# Patient Record
Sex: FEMALE | Race: WHITE | Employment: OTHER | URBAN - NONMETROPOLITAN AREA
[De-identification: names, ages, dates, MRNs, and addresses within clinical notes are randomized per-mention and may not be internally consistent; named-entity substitution may affect disease eponyms.]

---

## 2017-01-24 ENCOUNTER — OFFICE VISIT (OUTPATIENT)
Dept: FAMILY MEDICINE CLINIC | Age: 67
End: 2017-01-24

## 2017-01-24 VITALS
DIASTOLIC BLOOD PRESSURE: 76 MMHG | TEMPERATURE: 98.1 F | OXYGEN SATURATION: 98 % | SYSTOLIC BLOOD PRESSURE: 104 MMHG | HEART RATE: 75 BPM | WEIGHT: 110.6 LBS | BODY MASS INDEX: 21.71 KG/M2 | HEIGHT: 60 IN

## 2017-01-24 DIAGNOSIS — J44.9 CHRONIC OBSTRUCTIVE PULMONARY DISEASE, UNSPECIFIED COPD TYPE (HCC): Primary | ICD-10-CM

## 2017-01-24 DIAGNOSIS — I48.91 ATRIAL FIBRILLATION, UNSPECIFIED TYPE (HCC): ICD-10-CM

## 2017-01-24 DIAGNOSIS — J40 BRONCHITIS: ICD-10-CM

## 2017-01-24 DIAGNOSIS — D75.839 THROMBOCYTOSIS: ICD-10-CM

## 2017-01-24 PROCEDURE — 99213 OFFICE O/P EST LOW 20 MIN: CPT | Performed by: FAMILY MEDICINE

## 2017-01-24 PROCEDURE — G8484 FLU IMMUNIZE NO ADMIN: HCPCS | Performed by: FAMILY MEDICINE

## 2017-01-24 PROCEDURE — G8926 SPIRO NO PERF OR DOC: HCPCS | Performed by: FAMILY MEDICINE

## 2017-01-24 PROCEDURE — 4040F PNEUMOC VAC/ADMIN/RCVD: CPT | Performed by: FAMILY MEDICINE

## 2017-01-24 PROCEDURE — 1123F ACP DISCUSS/DSCN MKR DOCD: CPT | Performed by: FAMILY MEDICINE

## 2017-01-24 PROCEDURE — G8400 PT W/DXA NO RESULTS DOC: HCPCS | Performed by: FAMILY MEDICINE

## 2017-01-24 PROCEDURE — 3023F SPIROM DOC REV: CPT | Performed by: FAMILY MEDICINE

## 2017-01-24 PROCEDURE — 1090F PRES/ABSN URINE INCON ASSESS: CPT | Performed by: FAMILY MEDICINE

## 2017-01-24 PROCEDURE — 1036F TOBACCO NON-USER: CPT | Performed by: FAMILY MEDICINE

## 2017-01-24 PROCEDURE — G8427 DOCREV CUR MEDS BY ELIG CLIN: HCPCS | Performed by: FAMILY MEDICINE

## 2017-01-24 PROCEDURE — 3014F SCREEN MAMMO DOC REV: CPT | Performed by: FAMILY MEDICINE

## 2017-01-24 PROCEDURE — G8419 CALC BMI OUT NRM PARAM NOF/U: HCPCS | Performed by: FAMILY MEDICINE

## 2017-01-24 PROCEDURE — 3017F COLORECTAL CA SCREEN DOC REV: CPT | Performed by: FAMILY MEDICINE

## 2017-01-24 RX ORDER — MOXIFLOXACIN HYDROCHLORIDE 400 MG/1
400 TABLET ORAL DAILY
Qty: 10 TABLET | Refills: 0 | Status: SHIPPED | OUTPATIENT
Start: 2017-01-24 | End: 2017-02-03

## 2017-01-24 RX ORDER — GUAIFENESIN AND CODEINE PHOSPHATE 100; 10 MG/5ML; MG/5ML
5 SOLUTION ORAL EVERY 4 HOURS PRN
Qty: 150 ML | Refills: 0 | Status: SHIPPED | OUTPATIENT
Start: 2017-01-24 | End: 2017-01-31

## 2017-01-24 ASSESSMENT — PATIENT HEALTH QUESTIONNAIRE - PHQ9
2. FEELING DOWN, DEPRESSED OR HOPELESS: 0
SUM OF ALL RESPONSES TO PHQ9 QUESTIONS 1 & 2: 0
1. LITTLE INTEREST OR PLEASURE IN DOING THINGS: 0
SUM OF ALL RESPONSES TO PHQ QUESTIONS 1-9: 0

## 2017-04-09 ENCOUNTER — HOSPITAL ENCOUNTER (EMERGENCY)
Age: 67
Discharge: HOME | End: 2017-04-09
Payer: MEDICARE

## 2017-04-09 VITALS
SYSTOLIC BLOOD PRESSURE: 129 MMHG | TEMPERATURE: 97.6 F | HEART RATE: 90 BPM | DIASTOLIC BLOOD PRESSURE: 80 MMHG | WEIGHT: 111 LBS | BODY MASS INDEX: 21.68 KG/M2 | RESPIRATION RATE: 20 BRPM | OXYGEN SATURATION: 98 %

## 2017-04-09 DIAGNOSIS — M75.02 ADHESIVE CAPSULITIS OF LEFT SHOULDER: ICD-10-CM

## 2017-04-09 DIAGNOSIS — M25.512 ACUTE PAIN OF LEFT SHOULDER: Primary | ICD-10-CM

## 2017-04-09 PROCEDURE — 99282 EMERGENCY DEPT VISIT SF MDM: CPT

## 2017-04-09 RX ORDER — PREDNISONE 20 MG/1
20 TABLET ORAL 2 TIMES DAILY
Qty: 10 TABLET | Refills: 0 | Status: SHIPPED | OUTPATIENT
Start: 2017-04-09 | End: 2017-04-14

## 2017-04-09 ASSESSMENT — PAIN DESCRIPTION - ORIENTATION: ORIENTATION: LEFT

## 2017-04-09 ASSESSMENT — PAIN SCALES - GENERAL: PAINLEVEL_OUTOF10: 10

## 2017-04-09 ASSESSMENT — ENCOUNTER SYMPTOMS
SHORTNESS OF BREATH: 0
NAUSEA: 0
ABDOMINAL PAIN: 0
COUGH: 0
DIARRHEA: 0
VOMITING: 0

## 2017-04-09 ASSESSMENT — PAIN DESCRIPTION - LOCATION: LOCATION: SHOULDER

## 2017-04-10 ENCOUNTER — CARE COORDINATION (OUTPATIENT)
Dept: CARE COORDINATION | Age: 67
End: 2017-04-10

## 2017-04-13 ENCOUNTER — OFFICE VISIT (OUTPATIENT)
Dept: FAMILY MEDICINE CLINIC | Age: 67
End: 2017-04-13

## 2017-04-13 VITALS
HEIGHT: 60 IN | WEIGHT: 113.2 LBS | OXYGEN SATURATION: 99 % | BODY MASS INDEX: 22.23 KG/M2 | DIASTOLIC BLOOD PRESSURE: 70 MMHG | SYSTOLIC BLOOD PRESSURE: 138 MMHG | HEART RATE: 58 BPM

## 2017-04-13 DIAGNOSIS — M75.02 ADHESIVE CAPSULITIS OF LEFT SHOULDER: Primary | ICD-10-CM

## 2017-04-13 PROCEDURE — 3017F COLORECTAL CA SCREEN DOC REV: CPT | Performed by: FAMILY MEDICINE

## 2017-04-13 PROCEDURE — 1036F TOBACCO NON-USER: CPT | Performed by: FAMILY MEDICINE

## 2017-04-13 PROCEDURE — 1090F PRES/ABSN URINE INCON ASSESS: CPT | Performed by: FAMILY MEDICINE

## 2017-04-13 PROCEDURE — 3014F SCREEN MAMMO DOC REV: CPT | Performed by: FAMILY MEDICINE

## 2017-04-13 PROCEDURE — 4040F PNEUMOC VAC/ADMIN/RCVD: CPT | Performed by: FAMILY MEDICINE

## 2017-04-13 PROCEDURE — G8427 DOCREV CUR MEDS BY ELIG CLIN: HCPCS | Performed by: FAMILY MEDICINE

## 2017-04-13 PROCEDURE — 99213 OFFICE O/P EST LOW 20 MIN: CPT | Performed by: FAMILY MEDICINE

## 2017-04-13 PROCEDURE — 1123F ACP DISCUSS/DSCN MKR DOCD: CPT | Performed by: FAMILY MEDICINE

## 2017-04-13 PROCEDURE — G8400 PT W/DXA NO RESULTS DOC: HCPCS | Performed by: FAMILY MEDICINE

## 2017-04-13 PROCEDURE — G8419 CALC BMI OUT NRM PARAM NOF/U: HCPCS | Performed by: FAMILY MEDICINE

## 2017-04-13 PROCEDURE — 20610 DRAIN/INJ JOINT/BURSA W/O US: CPT | Performed by: FAMILY MEDICINE

## 2017-04-13 RX ORDER — PANTOPRAZOLE SODIUM 40 MG/1
TABLET, DELAYED RELEASE ORAL
Qty: 30 TABLET | Refills: 5 | Status: SHIPPED | OUTPATIENT
Start: 2017-04-13 | End: 2017-11-19 | Stop reason: SDUPTHER

## 2017-04-13 RX ORDER — TRIAMCINOLONE ACETONIDE 40 MG/ML
40 INJECTION, SUSPENSION INTRA-ARTICULAR; INTRAMUSCULAR ONCE
Status: COMPLETED | OUTPATIENT
Start: 2017-04-13 | End: 2017-04-13

## 2017-04-13 RX ADMIN — TRIAMCINOLONE ACETONIDE 40 MG: 40 INJECTION, SUSPENSION INTRA-ARTICULAR; INTRAMUSCULAR at 11:12

## 2017-04-20 ENCOUNTER — OFFICE VISIT (OUTPATIENT)
Dept: FAMILY MEDICINE CLINIC | Age: 67
End: 2017-04-20

## 2017-04-20 VITALS
BODY MASS INDEX: 22.19 KG/M2 | HEIGHT: 60 IN | DIASTOLIC BLOOD PRESSURE: 68 MMHG | HEART RATE: 82 BPM | SYSTOLIC BLOOD PRESSURE: 114 MMHG | OXYGEN SATURATION: 98 % | WEIGHT: 113 LBS

## 2017-04-20 DIAGNOSIS — Z12.31 ENCOUNTER FOR SCREENING MAMMOGRAM FOR MALIGNANT NEOPLASM OF BREAST: ICD-10-CM

## 2017-04-20 DIAGNOSIS — Z78.0 POSTMENOPAUSAL: ICD-10-CM

## 2017-04-20 DIAGNOSIS — M75.02 ADHESIVE CAPSULITIS OF LEFT SHOULDER: Primary | ICD-10-CM

## 2017-04-20 PROCEDURE — 3017F COLORECTAL CA SCREEN DOC REV: CPT | Performed by: FAMILY MEDICINE

## 2017-04-20 PROCEDURE — 1036F TOBACCO NON-USER: CPT | Performed by: FAMILY MEDICINE

## 2017-04-20 PROCEDURE — 1090F PRES/ABSN URINE INCON ASSESS: CPT | Performed by: FAMILY MEDICINE

## 2017-04-20 PROCEDURE — 3014F SCREEN MAMMO DOC REV: CPT | Performed by: FAMILY MEDICINE

## 2017-04-20 PROCEDURE — G8419 CALC BMI OUT NRM PARAM NOF/U: HCPCS | Performed by: FAMILY MEDICINE

## 2017-04-20 PROCEDURE — 99212 OFFICE O/P EST SF 10 MIN: CPT | Performed by: FAMILY MEDICINE

## 2017-04-20 PROCEDURE — G8400 PT W/DXA NO RESULTS DOC: HCPCS | Performed by: FAMILY MEDICINE

## 2017-04-20 PROCEDURE — 4040F PNEUMOC VAC/ADMIN/RCVD: CPT | Performed by: FAMILY MEDICINE

## 2017-04-20 PROCEDURE — G8427 DOCREV CUR MEDS BY ELIG CLIN: HCPCS | Performed by: FAMILY MEDICINE

## 2017-04-20 PROCEDURE — 1123F ACP DISCUSS/DSCN MKR DOCD: CPT | Performed by: FAMILY MEDICINE

## 2017-05-17 ENCOUNTER — HOSPITAL ENCOUNTER (OUTPATIENT)
Dept: WOMENS IMAGING | Age: 67
Discharge: HOME | End: 2017-05-17
Payer: MEDICARE

## 2017-05-17 DIAGNOSIS — Z12.31 ENCOUNTER FOR SCREENING MAMMOGRAM FOR MALIGNANT NEOPLASM OF BREAST: ICD-10-CM

## 2017-05-17 DIAGNOSIS — Z78.0 POSTMENOPAUSAL: ICD-10-CM

## 2017-05-17 PROCEDURE — 77080 DXA BONE DENSITY AXIAL: CPT

## 2017-05-17 PROCEDURE — G0202 SCR MAMMO BI INCL CAD: HCPCS

## 2017-05-18 RX ORDER — ALENDRONATE SODIUM 70 MG/1
70 TABLET ORAL
Qty: 4 TABLET | Refills: 11 | Status: SHIPPED | OUTPATIENT
Start: 2017-05-18 | End: 2018-04-21 | Stop reason: SDUPTHER

## 2017-08-10 ENCOUNTER — CARE COORDINATOR VISIT (OUTPATIENT)
Dept: FAMILY MEDICINE CLINIC | Age: 67
End: 2017-08-10

## 2017-08-16 ENCOUNTER — CARE COORDINATION (OUTPATIENT)
Dept: FAMILY MEDICINE CLINIC | Age: 67
End: 2017-08-16

## 2017-08-23 ENCOUNTER — CARE COORDINATION (OUTPATIENT)
Dept: FAMILY MEDICINE CLINIC | Age: 67
End: 2017-08-23

## 2017-09-06 ENCOUNTER — CARE COORDINATION (OUTPATIENT)
Dept: FAMILY MEDICINE CLINIC | Age: 67
End: 2017-09-06

## 2017-10-11 ENCOUNTER — CARE COORDINATION (OUTPATIENT)
Dept: FAMILY MEDICINE CLINIC | Age: 67
End: 2017-10-11

## 2017-10-11 NOTE — CARE COORDINATION
Ambulatory Care Coordination Note  10/11/2017  CM Risk Score: 5  Edwin Mortality Risk Score: 6.56    ACC: Adryan Ken, RN    Summary Note: I called to check in on Pedro Everett and to discuss COPD. She tells me that she is doing well however, she sounds a little stuffy. She says that she did have to use her rescue inhaler yesterday because she was cleaning out a linen closet and she feels that the dust aggravated her breathing. She tells me that she only used it once and it did help considerably. We did discuss COPD signs and symptoms and we also discussed the zone sheets and she thinks that she is in the green zone. She says that she continues to take all of her medications and we did review these. She denies any issues with heart palpitations or heart racing. She does not know how to check her heart rate but she feels that her heart is doing well and she will come in to the office later in the year to have me teach her how to check her pulse. She says that she is eating and drinking well. She is looking for a new ENT doctor as her current doctor will only be practicing in [facility] and she does not want to go that far. I have asked Pedro Everett to call me back if her sinus congestion gets any worse and she says that she will call. COPD Assessment    Does the patient understand envrionmental exposure?:  Yes  Is the patient able to verbalize Rescue vs. Long Acting medications?:  Yes  Does the patient have a nebulizer?:  No  Does the patient use a space with inhaled medications?:  No     No patient-reported symptoms         Symptoms:               Care Coordination Interventions    Program Enrollment:  Rising Risk  Referral from Primary Care Provider:  No  Suggested Interventions and Community Resources  Disease Association: In Process  Zone Management Tools:   In Process         Goals Addressed             Most Recent     Conditions and Symptoms   Improving (10/11/2017)             I will schedule office visits, as directed by my provider. I will keep my appointment or reschedule if I have to cancel. I will notify my provider of any barriers to my plan of care. I will follow my Zone Management tool to seek urgent or emergent care. I will notify my provider of any symptoms that indicate a worsening of my condition. Barriers: none  Plan for overcoming my barriers: N/A  Confidence: 10/10  Anticipated Goal Completion Date: 10/15/2017                Prior to Admission medications    Medication Sig Start Date End Date Taking?  Authorizing Provider   COMBIVENT RESPIMAT  MCG/ACT AERS inhaler INHALE 1 PUFF FOUR TIMES A DAY AS NEEDED 6/27/17  Yes Historical Provider, MD   fluticasone (FLONASE) 50 MCG/ACT nasal spray 2 sprays by Nasal route daily 8/30/17  Yes [address] [place], [place] [address]DOYLE   predniSONE (DELTASONE) 10 MG tablet Take 5 tabs daily x 3 days, 4 tabs daily x 3 days, 3 tabs daily x 3 days, 2 tabs daily x 3 days, 1 tab daily x 3 days 8/30/17  Yes Edith Acosta NP   benzonatate (TESSALON) 100 MG capsule 1-2 capsules 3x a day as needed for cough 8/30/17  Yes Edith Acosta NP   alendronate (FOSAMAX) 70 MG tablet Take 1 tablet by mouth every 7 days 5/18/17  Yes Crystal Dozier MD   pantoprazole (PROTONIX) 40 MG tablet TAKE ONE TABLET BY MOUTH DAILY 4/13/17  Yes Crystal Dozier MD   Vitamin D (CHOLECALCIFEROL) 1000 UNITS CAPS capsule Take 1,000 Units by mouth daily   Yes Historical Provider, MD   Multiple Vitamin (MULTI VITAMIN DAILY PO) Take by mouth   Yes Historical Provider, MD   anagrelide (AGRYLIN) 0.5 MG capsule 0.5 mg 2 times daily  8/17/15  Yes Historical Provider, MD   digoxin (LANOXIN) 125 MCG tablet  6/5/15  Yes Historical Provider, MD   verapamil (CALAN-SR) 120 MG CR tablet  6/5/15  Yes Historical Provider, MD       Future Appointments  Date Time Provider Alexandru Limon   12/20/2017 1:30 PM Odilon Gonzalez MD [facility]

## 2017-10-16 ENCOUNTER — HOSPITAL ENCOUNTER (OUTPATIENT)
Dept: GENERAL RADIOLOGY | Age: 67
Discharge: HOME | End: 2017-10-16
Payer: MEDICARE

## 2017-10-16 DIAGNOSIS — J20.9 BRONCHITIS, ACUTE, WITH BRONCHOSPASM: ICD-10-CM

## 2017-10-16 PROCEDURE — 71020 XR CHEST STANDARD TWO VW: CPT

## 2017-11-01 ENCOUNTER — HOSPITAL ENCOUNTER (OUTPATIENT)
Dept: GENERAL RADIOLOGY | Age: 67
Discharge: HOME | End: 2017-11-01
Payer: MEDICARE

## 2017-11-01 DIAGNOSIS — R07.81 RIB PAIN ON LEFT SIDE: ICD-10-CM

## 2017-11-01 DIAGNOSIS — R07.81 RIB PAIN ON RIGHT SIDE: ICD-10-CM

## 2017-11-01 PROCEDURE — 71110 X-RAY EXAM RIBS BIL 3 VIEWS: CPT

## 2017-11-13 ENCOUNTER — CARE COORDINATION (OUTPATIENT)
Dept: FAMILY MEDICINE CLINIC | Age: 67
End: 2017-11-13

## 2017-11-14 NOTE — CARE COORDINATION
Ambulatory Care Coordination Note  11/14/2017  CM Risk Score: 1  Edwin Mortality Risk Score: 4.95    ACC: Jessica Alex, RN    Summary Note: I called to check in on Paco Elias and she sounds a bit raspy. She tells me that this is her morning voice. She says that she did go to the walk in clinic not to long ago because she received a \"bear hug\" from a friend and she felt a pop in her rib cage. She says that she had xray's and everything seemed to be okay. She says that she is still using ice and heat. She is also taking tylenol occasionally when she is having rib pain. She tells me that her breathing has been pretty good. She is very cautious around different environments because she understands her triggers for her breathing. These include smoke, perfumes, heavy scents, and dust.  We did talk at length about triggers, breathing and the stress, and breathing techniques. She tells me that she does clean the [employer] every other week and sometimes the aerosols will start her coughing and the dust seems to be an issue as well. I suggested that she wear a mask to help filter some of these triggers. She tells me that she will try the mask. She feels that she is in the green zone for COPD. I did speak with her about making regular appointments as she has not been for a check up since April. She would like to wait until after the holidays to schedule an appointment with Dr Aris Florian. COPD Assessment    Does the patient understand envrionmental exposure?:  Yes  Is the patient able to verbalize Rescue vs. Long Acting medications?:  Yes  Does the patient have a nebulizer?:  No  Does the patient use a space with inhaled medications?:  No     No patient-reported symptoms         Symptoms:               Care Coordination Interventions    Program Enrollment:  Rising Risk  Referral from Primary Care Provider:  No  Suggested Interventions and Community Resources  Disease Association: In Process  Zone Management Tools:   In

## 2017-11-20 RX ORDER — PANTOPRAZOLE SODIUM 40 MG/1
TABLET, DELAYED RELEASE ORAL
Qty: 30 TABLET | Refills: 4 | Status: SHIPPED | OUTPATIENT
Start: 2017-11-20 | End: 2018-05-02 | Stop reason: SDUPTHER

## 2017-12-13 ENCOUNTER — CARE COORDINATION (OUTPATIENT)
Dept: CARE COORDINATION | Age: 67
End: 2017-12-13

## 2017-12-18 NOTE — CARE COORDINATION
Ambulatory Care Coordination Note  12/18/2017  CM Risk Score: 1  Edwin Mortality Risk Score: 6.56    ACC: Lisa Mc, RN    Summary Note: I called to check in on [facility] and to discuss COPD. She does sound a little bit stuffy. She says that she is doing okay and that she has been outside ringing the bell for the [employer]. She denies any chest congestion or cough. She says that she did see her ENT and he is closing the [facility] office so she is supposed to see Dr Remi Claire  However, she says that he is not in the office for personal reasons. She says that she really does not have anything going on right now but if she does she may need to change to someone else. She says that she does not want to go outside [place]. I did suggest Dr Robinson Fields and she will think about seeing him. She denies any SOB with activity or at rest. She says that her medications are good and she is taking everything as prescribed. Care Coordination Interventions    Program Enrollment:  Rising Risk  Referral from Primary Care Provider:  No  Suggested Interventions and Community Resources  Disease Association: In Process  Zone Management Tools: In Process         Goals Addressed     None          Prior to Admission medications    Medication Sig Start Date End Date Taking?  Authorizing Provider   pantoprazole (PROTONIX) 40 MG tablet TAKE ONE TABLET BY MOUTH DAILY 11/20/17   Edwina Dunlap MD   albuterol sulfate  (90 Base) MCG/ACT inhaler Inhale 2 puffs into the lungs every 6 hours as needed for Wheezing 10/16/17   Jameel Daley NP   methylPREDNISolone (MEDROL DOSEPACK) 4 MG tablet Take by mouth. 10/16/17   Jameel Daley NP   COMBIVENT RESPIMAT  MCG/ACT AERS inhaler INHALE 1 PUFF FOUR TIMES A DAY AS NEEDED 6/27/17   Historical Provider, MD   fluticasone (FLONASE) 50 MCG/ACT nasal spray 2 sprays by Nasal route daily 8/30/17   Stephane Acosta NP   alendronate (FOSAMAX) 70 MG tablet Take 1 tablet by mouth every 7 days 5/18/17   Eleanor Danielle MD   Vitamin D (CHOLECALCIFEROL) 1000 UNITS CAPS capsule Take 1,000 Units by mouth daily    Historical Provider, MD   Multiple Vitamin (MULTI VITAMIN DAILY PO) Take by mouth    Historical Provider, MD   anagrelide Will Homme) 0.5 MG capsule 0.5 mg 2 times daily  8/17/15   Historical Provider, MD   digoxin [facility] TRANSPLANT [facility]) 125 MCG tablet  6/5/15   Historical Provider, MD   verapamil (CALAN-SR) 120 MG CR tablet  6/5/15   Historical Provider, MD       Future Appointments  Date Time Provider Alexandru Limon   12/20/2017 1:30 PM Lobo Park MD [facility]

## 2017-12-20 ENCOUNTER — OFFICE VISIT (OUTPATIENT)
Dept: PULMONOLOGY | Age: 67
End: 2017-12-20

## 2017-12-20 VITALS
HEIGHT: 60 IN | WEIGHT: 112 LBS | BODY MASS INDEX: 21.99 KG/M2 | SYSTOLIC BLOOD PRESSURE: 118 MMHG | OXYGEN SATURATION: 98 % | HEART RATE: 75 BPM | DIASTOLIC BLOOD PRESSURE: 54 MMHG

## 2017-12-20 DIAGNOSIS — J44.9 CHRONIC OBSTRUCTIVE PULMONARY DISEASE, UNSPECIFIED COPD TYPE (HCC): Primary | ICD-10-CM

## 2017-12-20 DIAGNOSIS — J32.9 RECURRENT SINUSITIS: ICD-10-CM

## 2017-12-20 PROCEDURE — 99214 OFFICE O/P EST MOD 30 MIN: CPT | Performed by: INTERNAL MEDICINE

## 2017-12-20 PROCEDURE — 1123F ACP DISCUSS/DSCN MKR DOCD: CPT | Performed by: INTERNAL MEDICINE

## 2017-12-20 PROCEDURE — 1090F PRES/ABSN URINE INCON ASSESS: CPT | Performed by: INTERNAL MEDICINE

## 2017-12-20 PROCEDURE — G8399 PT W/DXA RESULTS DOCUMENT: HCPCS | Performed by: INTERNAL MEDICINE

## 2017-12-20 PROCEDURE — G8926 SPIRO NO PERF OR DOC: HCPCS | Performed by: INTERNAL MEDICINE

## 2017-12-20 PROCEDURE — G8420 CALC BMI NORM PARAMETERS: HCPCS | Performed by: INTERNAL MEDICINE

## 2017-12-20 PROCEDURE — 3014F SCREEN MAMMO DOC REV: CPT | Performed by: INTERNAL MEDICINE

## 2017-12-20 PROCEDURE — 3017F COLORECTAL CA SCREEN DOC REV: CPT | Performed by: INTERNAL MEDICINE

## 2017-12-20 PROCEDURE — G8427 DOCREV CUR MEDS BY ELIG CLIN: HCPCS | Performed by: INTERNAL MEDICINE

## 2017-12-20 PROCEDURE — G8484 FLU IMMUNIZE NO ADMIN: HCPCS | Performed by: INTERNAL MEDICINE

## 2017-12-20 PROCEDURE — 1036F TOBACCO NON-USER: CPT | Performed by: INTERNAL MEDICINE

## 2017-12-20 PROCEDURE — 4040F PNEUMOC VAC/ADMIN/RCVD: CPT | Performed by: INTERNAL MEDICINE

## 2017-12-20 PROCEDURE — 3023F SPIROM DOC REV: CPT | Performed by: INTERNAL MEDICINE

## 2017-12-20 RX ORDER — MAGNESIUM HYDROXIDE 1200 MG/15ML
1 LIQUID ORAL 2 TIMES DAILY
Refills: 12 | COMMUNITY
Start: 2017-11-30 | End: 2019-01-01

## 2017-12-20 ASSESSMENT — ENCOUNTER SYMPTOMS
VOMITING: 0
SORE THROAT: 0
ABDOMINAL PAIN: 0
SHORTNESS OF BREATH: 0
RHINORRHEA: 0
WHEEZING: 1
CHEST TIGHTNESS: 0
DIARRHEA: 0
EYE DISCHARGE: 0
TROUBLE SWALLOWING: 0
SINUS PRESSURE: 0
EYE ITCHING: 0
NAUSEA: 0
COUGH: 1
VOICE CHANGE: 0

## 2017-12-20 NOTE — PROGRESS NOTES
Subjective:     Lizbeth Casarez is a 75 y.o. female who complains today of:     Chief Complaint   Patient presents with    Follow-up       HPI  Patient is using combivent respimat 1puff QID prn.  C/o Shortness of breath   Occasional Wheezing   C/o Cough with  Sputum  No Hemoptysis  No Chest pain or pleuritic pain  No Fever or chills. No Rhinorrhea or postnasal drip. Allergies:  Lorazepam; Penicillins; Rocephin [ceftriaxone]; Thiopental; Vancomycin; and Asa [aspirin]  Past Medical History:   Diagnosis Date    Atrial fibrillation (HCC)     Chronic anemia     Chronic rhinitis     DR. NAPIER    Chronic sinusitis     Colon polyp 06/03/2015    DR Lara Meza    Diverticulosis of colon (without mention of hemorrhage) 06/03/2015    DR Lara Meza    Lung mass     was suspicious for malignancy, but path negative.  MVP (mitral valve prolapse) 5/28/2014    Recurrent sinusitis     DR. NAPIER    Thrombocytosis ([facility] [address]     Thyroid nodule      Past Surgical History:   Procedure Laterality Date    BRONCHOSCOPY      CATARACT REMOVAL WITH IMPLANT Left 03/18/16    [facility] Mansi Meyer MD    CATARACT REMOVAL WITH IMPLANT Right 04/22/16    [facility] Mansi Meyer MD    COLONOSCOPY  06/03/2015    DR Lara Meza - DIVERTICULOSIS, POLYPS    HYSTERECTOMY      TEAR DUCT SURGERY Left 12/16/2016    [facility] PEDRO LUIS TELLO MD      THYROIDECTOMY, PARTIAL  11/18/14    PATRICIA CHACON MD    UPPER GASTROINTESTINAL ENDOSCOPY  7/13/15    w/bx      History reviewed. No pertinent family history. Social History     Social History    Marital status:      Spouse name: N/A    Number of children: N/A    Years of education: N/A     Occupational History    Not on file.      Social History Main Topics    Smoking status: Former Smoker     Quit date: 12/26/1982    Smokeless tobacco: Never Used      Comment: quit 34 years ago    Alcohol use No    Drug use: No    Sexual activity: No     Other Topics Concern    Not on file     Social History Narrative    No narrative on file         Review of Systems   Constitutional: Negative for chills, diaphoresis, fatigue and fever. HENT: Negative for congestion, mouth sores, nosebleeds, postnasal drip, rhinorrhea, sinus pressure, sneezing, sore throat, trouble swallowing and voice change. Eyes: Negative for discharge, itching and visual disturbance. Respiratory: Positive for cough and wheezing. Negative for chest tightness and shortness of breath. Cardiovascular: Negative for chest pain, palpitations and leg swelling. Gastrointestinal: Negative for abdominal pain, diarrhea, nausea and vomiting. Genitourinary: Negative for difficulty urinating and hematuria. Musculoskeletal: Negative for arthralgias, joint swelling and myalgias. Skin: Negative for rash. Allergic/Immunologic: Negative for environmental allergies. Neurological: Negative for dizziness, tremors, weakness and headaches. Psychiatric/Behavioral: Negative for behavioral problems and sleep disturbance. Objective:     Vitals:    12/20/17 1317   BP: (!) 118/54   Pulse: 75   SpO2: 98%   Weight: 112 lb (50.8 kg)   Height: 5' (1.524 m)         Physical Exam   Constitutional: She is oriented to person, place, and time. She appears well-developed and well-nourished. No distress. HENT:   Head: Normocephalic and atraumatic. Nose: Nose normal.   Mouth/Throat: Oropharynx is clear and moist.   Eyes: EOM are normal. Pupils are equal, round, and reactive to light. Neck: No JVD present. No tracheal deviation present. No thyromegaly present. Cardiovascular: Normal rate and regular rhythm. Exam reveals no gallop and no friction rub. No murmur heard. Pulmonary/Chest: No respiratory distress. She has wheezes. She has no rales. She exhibits no tenderness. Diminished BS bilaterally. Abdominal: She exhibits no distension. There is no tenderness. There is no rebound. Musculoskeletal: Normal range of motion. She exhibits no edema. CHEST.        Assessment/Plan:     1. Chronic obstructive pulmonary disease, unspecified COPD type [facility])  Patient had a chest x-ray done which shows no active disease on October 16, 2070. She is using combivent respimat 1 puff 4 times a day when necessary basis. Exertional shortness of breath and no wheezing. I'll request complete PFT for further evaluation before next visit and see if needed to changed bronchodilator. 2. Recurrent sinusitis  Patient has a complaint of recurrent sinusitis and postnasal drip. Cough is likely due to postnasal drip. Return in about 3 months (around 3/20/2018) for COPD.       Antonia Hester MD

## 2017-12-29 ENCOUNTER — HOSPITAL ENCOUNTER (OUTPATIENT)
Dept: GENERAL RADIOLOGY | Age: 67
Discharge: HOME | End: 2017-12-29
Payer: MEDICARE

## 2017-12-29 DIAGNOSIS — J44.9 CHRONIC OBSTRUCTIVE PULMONARY DISEASE, UNSPECIFIED COPD TYPE (HCC): ICD-10-CM

## 2017-12-29 PROCEDURE — 71020 XR CHEST STANDARD TWO VW: CPT

## 2018-01-17 ENCOUNTER — CARE COORDINATION (OUTPATIENT)
Dept: FAMILY MEDICINE CLINIC | Age: 68
End: 2018-01-17

## 2018-01-29 ENCOUNTER — HOSPITAL ENCOUNTER (OUTPATIENT)
Dept: PULMONOLOGY | Age: 68
Discharge: HOME | End: 2018-01-29
Payer: COMMERCIAL

## 2018-01-29 DIAGNOSIS — J44.9 CHRONIC OBSTRUCTIVE PULMONARY DISEASE, UNSPECIFIED COPD TYPE (HCC): ICD-10-CM

## 2018-01-29 PROCEDURE — 94060 EVALUATION OF WHEEZING: CPT

## 2018-01-29 PROCEDURE — 94060 EVALUATION OF WHEEZING: CPT | Performed by: INTERNAL MEDICINE

## 2018-01-29 PROCEDURE — 94726 PLETHYSMOGRAPHY LUNG VOLUMES: CPT | Performed by: INTERNAL MEDICINE

## 2018-01-29 PROCEDURE — 94729 DIFFUSING CAPACITY: CPT

## 2018-01-29 PROCEDURE — 94726 PLETHYSMOGRAPHY LUNG VOLUMES: CPT

## 2018-01-29 PROCEDURE — 94729 DIFFUSING CAPACITY: CPT | Performed by: INTERNAL MEDICINE

## 2018-01-29 PROCEDURE — 6360000002 HC RX W HCPCS: Performed by: INTERNAL MEDICINE

## 2018-01-29 RX ORDER — ALBUTEROL SULFATE 2.5 MG/3ML
2.5 SOLUTION RESPIRATORY (INHALATION) ONCE
Status: COMPLETED | OUTPATIENT
Start: 2018-01-29 | End: 2018-01-29

## 2018-01-29 RX ADMIN — ALBUTEROL SULFATE 2.5 MG: 2.5 SOLUTION RESPIRATORY (INHALATION) at 10:58

## 2018-01-31 NOTE — PROCEDURES
[address]                       [address], [address] [facility]                                PULMONARY FUNCTION    PATIENT NAME: Souleymane Garza                     :        1950  MED REC NO:   [id number]                            ROOM:  ACCOUNT NO:   [de-identified]                           ADMIT DATE: 2018  PROVIDER:     Wen Mccormick MD    DATE OF PROCEDURE:  2018    PFTs were done on this 21-year-old patient, who is 5 feet tall, weighs 112  pounds, with 10-year smoking history of few cigarettes a day, but quit 34  years ago, presenting with dyspnea and cough. Spirometry showed a forced vital capacity of 1.88 L, which is 73% of  predicted. FEV1 was 1.41 L, which is 72% of predicted. FEV1/FVC ratio was  within normal limits at 75%. FEF 25-75% was moderately reduced to 1.09 L  per second, which is 63% of predicted. Minimal improvement noted in FEV1  and in terminal airflow after bronchodilator therapy. MVV was adequate. Lung volumes done by body plethysmography showed a normal total lung  capacity of 4.25 L, which is 95% of predicted. Residual volume was 2.29 L,  which is 116% of predicted with increased RV/TLC ratio at 54%. Diffusion capacity was mildly reduced to 13.7, which is 72% of predicted. Airway resistance was mildly increased. Airway conductance was decreased. OVERALL IMPRESSION:  This study is consistent with mild obstructive  ventilatory impairment with borderline reversibility after bronchodilator  therapy, associated with mild overinflation and mild diffusion impairment.         Marybeth Coleman MD    D: 2018 10:53:26       T: 2018 13:27:46     [email or web address]  Job#: [id number]     Doc#: [id number]    CC:

## 2018-02-23 ENCOUNTER — CARE COORDINATION (OUTPATIENT)
Dept: FAMILY MEDICINE CLINIC | Age: 68
End: 2018-02-23

## 2018-02-23 NOTE — CARE COORDINATION
10/15/2017                Prior to Admission medications    Medication Sig Start Date End Date Taking?  Authorizing Provider   sodium chloride 0.9 % irrigation Irrigate with 1 mL as directed 2 times daily 11/30/17   Historical Provider, MD   mupirocin (BACTROBAN) 2 % ointment Apply 2 Act topically 2 times daily 11/30/17   Historical Provider, MD   pantoprazole (PROTONIX) 40 MG tablet TAKE ONE TABLET BY MOUTH DAILY 11/20/17   Isma Garsia MD   albuterol sulfate  (90 Base) MCG/ACT inhaler Inhale 2 puffs into the lungs every 6 hours as needed for Wheezing 10/16/17   Shelly Bazzi NP   methylPREDNISolone (MEDROL DOSEPACK) 4 MG tablet Take by mouth. 10/16/17   Shelly Bazzi NP   COMBIVENT RESPIMAT  MCG/ACT AERS inhaler INHALE 1 PUFF FOUR TIMES A DAY AS NEEDED 6/27/17   Historical Provider, MD   fluticasone (FLONASE) 50 MCG/ACT nasal spray 2 sprays by Nasal route daily 8/30/17   Dougie Garcia NP   alendronate (FOSAMAX) 70 MG tablet Take 1 tablet by mouth every 7 days 5/18/17   Isma Garsia MD   Vitamin D (CHOLECALCIFEROL) 1000 UNITS CAPS capsule Take 1,000 Units by mouth daily    Historical Provider, MD   Multiple Vitamin (MULTI VITAMIN DAILY PO) Take by mouth    Historical Provider, MD   anagrelide Julious Anes) 0.5 MG capsule 0.5 mg 2 times daily  8/17/15   Historical Provider, MD   digoxin (LANOXIN) 125 MCG tablet  6/5/15   Historical Provider, MD   verapamil (CALAN-SR) 120 MG CR tablet  6/5/15   Historical Provider, MD       Future Appointments  Date Time Provider Alexandru Limon   3/29/2018 2:00 PM Doc Lazaro MD [facility]

## 2018-03-13 LAB — DIGOXIN LEVEL: 1.5 NG/ML (ref 0.8–2)

## 2018-03-23 ENCOUNTER — CARE COORDINATION (OUTPATIENT)
Dept: FAMILY MEDICINE CLINIC | Age: 68
End: 2018-03-23

## 2018-03-23 NOTE — CARE COORDINATION
Ambulatory Care Coordination Note  3/23/2018  CM Risk Score: 1  Edwin Mortality Risk Score: 7.11    ACC: Josh Garsia RN    Summary Note: Altagracia Meier returned my call. She tells me that she is doing well in general.  She says that she did see Dr Alia Diaz last week as she normally sees her once per year for her \"leaky mitral valve. \"  She tells me that she asked a few questions and then suggested that she wear a heart monitor for a month. She has had the monitor for about a week. She denies any issues with SOB, chest pain, or dizziness. She tells me that she is eating and drinking well. She also says that she feels that she is getting plenty of rest.  She does clean at the [employer] from time to time however, she is wearing a mask to clean as I suggested and she is not having any issues. She tells me that she feels that she is in the green zone for COPD. COPD Assessment    Does the patient understand envrionmental exposure?:  Yes  Is the patient able to verbalize Rescue vs. Long Acting medications?:  Yes  Does the patient have a nebulizer?:  No  Does the patient use a space with inhaled medications?:  No            Symptoms:               Care Coordination Interventions    Program Enrollment:  Rising Risk  Referral from Primary Care Provider:  No  Suggested Interventions and Community Resources  Disease Association: In Process  Zone Management Tools: In Process         Goals Addressed             Most Recent     Conditions and Symptoms   On track (3/23/2018)             I will schedule office visits, as directed by my provider. I will keep my appointment or reschedule if I have to cancel. I will notify my provider of any barriers to my plan of care. I will follow my Zone Management tool to seek urgent or emergent care. I will notify my provider of any symptoms that indicate a worsening of my condition.     Barriers: none  Plan for overcoming my barriers: N/A  Confidence: 10/10  Anticipated Goal Completion

## 2018-03-29 ENCOUNTER — HOSPITAL ENCOUNTER (OUTPATIENT)
Dept: GENERAL RADIOLOGY | Age: 68
Discharge: HOME | End: 2018-03-31
Payer: MEDICARE

## 2018-03-29 ENCOUNTER — OFFICE VISIT (OUTPATIENT)
Dept: PULMONOLOGY | Age: 68
End: 2018-03-29
Payer: MEDICARE

## 2018-03-29 VITALS
TEMPERATURE: 96.7 F | HEART RATE: 72 BPM | SYSTOLIC BLOOD PRESSURE: 118 MMHG | WEIGHT: 113 LBS | RESPIRATION RATE: 14 BRPM | OXYGEN SATURATION: 97 % | DIASTOLIC BLOOD PRESSURE: 60 MMHG | BODY MASS INDEX: 22.07 KG/M2

## 2018-03-29 DIAGNOSIS — J98.11 ATELECTASIS, BILATERAL: ICD-10-CM

## 2018-03-29 DIAGNOSIS — R09.82 POSTNASAL DRIP: ICD-10-CM

## 2018-03-29 DIAGNOSIS — J44.9 CHRONIC OBSTRUCTIVE PULMONARY DISEASE, UNSPECIFIED COPD TYPE (HCC): ICD-10-CM

## 2018-03-29 DIAGNOSIS — J44.9 CHRONIC OBSTRUCTIVE PULMONARY DISEASE, UNSPECIFIED COPD TYPE (HCC): Primary | ICD-10-CM

## 2018-03-29 DIAGNOSIS — J32.9 RECURRENT SINUSITIS: ICD-10-CM

## 2018-03-29 PROCEDURE — 99214 OFFICE O/P EST MOD 30 MIN: CPT | Performed by: INTERNAL MEDICINE

## 2018-03-29 PROCEDURE — 71046 X-RAY EXAM CHEST 2 VIEWS: CPT

## 2018-03-29 RX ORDER — AZITHROMYCIN 250 MG/1
TABLET, FILM COATED ORAL
Qty: 1 PACKET | Refills: 0 | Status: SHIPPED | OUTPATIENT
Start: 2018-03-29 | End: 2018-10-01 | Stop reason: SDUPTHER

## 2018-03-29 ASSESSMENT — ENCOUNTER SYMPTOMS
WHEEZING: 0
TROUBLE SWALLOWING: 0
SINUS PRESSURE: 0
EYE DISCHARGE: 0
EYE ITCHING: 0
SHORTNESS OF BREATH: 1
VOICE CHANGE: 0
COUGH: 0
NAUSEA: 0
DIARRHEA: 0
CHEST TIGHTNESS: 0
ABDOMINAL PAIN: 0
SORE THROAT: 0
VOMITING: 0
RHINORRHEA: 0

## 2018-04-23 ENCOUNTER — CARE COORDINATION (OUTPATIENT)
Dept: FAMILY MEDICINE CLINIC | Age: 68
End: 2018-04-23

## 2018-04-24 ENCOUNTER — OFFICE VISIT (OUTPATIENT)
Dept: FAMILY MEDICINE CLINIC | Age: 68
End: 2018-04-24
Payer: MEDICARE

## 2018-04-24 VITALS
HEIGHT: 60 IN | WEIGHT: 112.2 LBS | HEART RATE: 75 BPM | OXYGEN SATURATION: 98 % | SYSTOLIC BLOOD PRESSURE: 120 MMHG | DIASTOLIC BLOOD PRESSURE: 62 MMHG | BODY MASS INDEX: 22.03 KG/M2

## 2018-04-24 DIAGNOSIS — D75.839 THROMBOCYTOSIS: ICD-10-CM

## 2018-04-24 DIAGNOSIS — Z11.59 ENCOUNTER FOR HEPATITIS C SCREENING TEST FOR LOW RISK PATIENT: ICD-10-CM

## 2018-04-24 DIAGNOSIS — J44.9 CHRONIC OBSTRUCTIVE PULMONARY DISEASE, UNSPECIFIED COPD TYPE (HCC): ICD-10-CM

## 2018-04-24 DIAGNOSIS — R73.9 HYPERGLYCEMIA: ICD-10-CM

## 2018-04-24 DIAGNOSIS — M81.0 OSTEOPOROSIS, UNSPECIFIED OSTEOPOROSIS TYPE, UNSPECIFIED PATHOLOGICAL FRACTURE PRESENCE: ICD-10-CM

## 2018-04-24 DIAGNOSIS — J32.9 RECURRENT SINUSITIS: ICD-10-CM

## 2018-04-24 DIAGNOSIS — Z13.220 SCREENING, LIPID: ICD-10-CM

## 2018-04-24 DIAGNOSIS — J31.0 CHRONIC RHINITIS, UNSPECIFIED TYPE: ICD-10-CM

## 2018-04-24 DIAGNOSIS — I48.91 ATRIAL FIBRILLATION, UNSPECIFIED TYPE (HCC): Primary | ICD-10-CM

## 2018-04-24 DIAGNOSIS — M54.17 LUMBOSACRAL RADICULOPATHY AT L5: ICD-10-CM

## 2018-04-24 DIAGNOSIS — D64.9 CHRONIC ANEMIA: ICD-10-CM

## 2018-04-24 LAB
CHOLESTEROL, TOTAL: 227 MG/DL (ref 0–199)
HBA1C MFR BLD: 5.6 % (ref 4.8–5.9)
HDLC SERPL-MCNC: 56 MG/DL (ref 40–59)
HEPATITIS C ANTIBODY INTERPRETATION: NORMAL
LDL CHOLESTEROL CALCULATED: 147 MG/DL (ref 0–129)
TRIGL SERPL-MCNC: 121 MG/DL (ref 0–200)

## 2018-04-24 PROCEDURE — 99214 OFFICE O/P EST MOD 30 MIN: CPT | Performed by: FAMILY MEDICINE

## 2018-04-24 ASSESSMENT — PATIENT HEALTH QUESTIONNAIRE - PHQ9
SUM OF ALL RESPONSES TO PHQ9 QUESTIONS 1 & 2: 0
2. FEELING DOWN, DEPRESSED OR HOPELESS: 0
1. LITTLE INTEREST OR PLEASURE IN DOING THINGS: 0
SUM OF ALL RESPONSES TO PHQ QUESTIONS 1-9: 0

## 2018-05-15 ENCOUNTER — HOSPITAL ENCOUNTER (OUTPATIENT)
Dept: WOMENS IMAGING | Age: 68
Discharge: HOME | End: 2018-05-17
Payer: MEDICARE

## 2018-05-15 DIAGNOSIS — M81.0 OSTEOPOROSIS, UNSPECIFIED OSTEOPOROSIS TYPE, UNSPECIFIED PATHOLOGICAL FRACTURE PRESENCE: ICD-10-CM

## 2018-05-15 PROCEDURE — 77080 DXA BONE DENSITY AXIAL: CPT

## 2018-05-18 ENCOUNTER — TELEPHONE (OUTPATIENT)
Dept: FAMILY MEDICINE CLINIC | Age: 68
End: 2018-05-18

## 2018-06-04 ENCOUNTER — CARE COORDINATION (OUTPATIENT)
Dept: FAMILY MEDICINE CLINIC | Age: 68
End: 2018-06-04

## 2018-06-08 ENCOUNTER — CARE COORDINATION (OUTPATIENT)
Dept: FAMILY MEDICINE CLINIC | Age: 68
End: 2018-06-08

## 2018-07-06 ENCOUNTER — CARE COORDINATION (OUTPATIENT)
Dept: FAMILY MEDICINE CLINIC | Age: 68
End: 2018-07-06

## 2018-07-24 ENCOUNTER — OFFICE VISIT (OUTPATIENT)
Dept: FAMILY MEDICINE CLINIC | Age: 68
End: 2018-07-24
Payer: MEDICARE

## 2018-07-24 VITALS
BODY MASS INDEX: 22.03 KG/M2 | OXYGEN SATURATION: 98 % | HEART RATE: 85 BPM | DIASTOLIC BLOOD PRESSURE: 82 MMHG | WEIGHT: 112.2 LBS | SYSTOLIC BLOOD PRESSURE: 128 MMHG | HEIGHT: 60 IN

## 2018-07-24 DIAGNOSIS — D75.839 THROMBOCYTOSIS: ICD-10-CM

## 2018-07-24 DIAGNOSIS — Z12.11 SCREEN FOR COLON CANCER: ICD-10-CM

## 2018-07-24 DIAGNOSIS — I48.91 ATRIAL FIBRILLATION, UNSPECIFIED TYPE (HCC): ICD-10-CM

## 2018-07-24 DIAGNOSIS — J44.9 CHRONIC OBSTRUCTIVE PULMONARY DISEASE, UNSPECIFIED COPD TYPE (HCC): Primary | ICD-10-CM

## 2018-07-24 DIAGNOSIS — I34.1 MVP (MITRAL VALVE PROLAPSE): ICD-10-CM

## 2018-07-24 PROCEDURE — 99213 OFFICE O/P EST LOW 20 MIN: CPT | Performed by: FAMILY MEDICINE

## 2018-07-24 NOTE — PROGRESS NOTES
10/01/2016     10/01/2016    CHOL 227 (H) 04/24/2018    TRIG 121 04/24/2018    HDL 56 04/24/2018    ALT 7 12/22/2017    AST 10 12/22/2017     12/22/2017    K 5.0 12/22/2017    CL 97 (L) 12/22/2017    CREATININE 0.62 12/22/2017    BUN 22 12/22/2017    CO2 26 12/22/2017    TSH 1.580 06/03/2015    INR 0.9 02/24/2016    LABA1C 5.6 04/24/2018         A&P   Diagnosis Orders   1. Chronic obstructive pulmonary disease, unspecified COPD type ([address]     2. Atrial fibrillation, unspecified type ([address]     3. Thrombocytosis ([address]     4. MVP (mitral valve prolapse)     5.  Screen for colon cancer  Lynsey Win MD - [facility]     GI referral     Labs as ordered    Ongoing cardio follow up   She is on verapamil and digoxin    No anticoag given chronic anemia    F/u with hematology    Chronic conditions are stable  Continue current regimen  Follow up with appropriate specialists and here routinely for ongoing monitoring of chronic conditions      Rowan Stacy MD

## 2018-08-06 ENCOUNTER — HOSPITAL ENCOUNTER (OUTPATIENT)
Age: 68
Setting detail: OUTPATIENT SURGERY
Discharge: HOME | End: 2018-08-06
Attending: INTERNAL MEDICINE | Admitting: INTERNAL MEDICINE
Payer: MEDICARE

## 2018-08-06 ENCOUNTER — ANESTHESIA (OUTPATIENT)
Dept: ENDOSCOPY | Age: 68
End: 2018-08-06
Payer: MEDICARE

## 2018-08-06 ENCOUNTER — ANESTHESIA EVENT (OUTPATIENT)
Dept: ENDOSCOPY | Age: 68
End: 2018-08-06
Payer: MEDICARE

## 2018-08-06 VITALS
RESPIRATION RATE: 16 BRPM | HEART RATE: 60 BPM | SYSTOLIC BLOOD PRESSURE: 165 MMHG | OXYGEN SATURATION: 99 % | HEIGHT: 60 IN | WEIGHT: 112 LBS | TEMPERATURE: 98.2 F | DIASTOLIC BLOOD PRESSURE: 78 MMHG | BODY MASS INDEX: 21.99 KG/M2

## 2018-08-06 VITALS
SYSTOLIC BLOOD PRESSURE: 119 MMHG | DIASTOLIC BLOOD PRESSURE: 67 MMHG | RESPIRATION RATE: 5 BRPM | OXYGEN SATURATION: 100 %

## 2018-08-06 PROCEDURE — 2580000003 HC RX 258: Performed by: INTERNAL MEDICINE

## 2018-08-06 PROCEDURE — 7100000010 HC PHASE II RECOVERY - FIRST 15 MIN: Performed by: INTERNAL MEDICINE

## 2018-08-06 PROCEDURE — 3700000001 HC ADD 15 MINUTES (ANESTHESIA): Performed by: INTERNAL MEDICINE

## 2018-08-06 PROCEDURE — 3609027000 HC COLONOSCOPY: Performed by: INTERNAL MEDICINE

## 2018-08-06 PROCEDURE — 6360000002 HC RX W HCPCS: Performed by: NURSE ANESTHETIST, CERTIFIED REGISTERED

## 2018-08-06 PROCEDURE — 3700000000 HC ANESTHESIA ATTENDED CARE: Performed by: INTERNAL MEDICINE

## 2018-08-06 PROCEDURE — 45385 COLONOSCOPY W/LESION REMOVAL: CPT | Performed by: INTERNAL MEDICINE

## 2018-08-06 PROCEDURE — 88305 TISSUE EXAM BY PATHOLOGIST: CPT

## 2018-08-06 RX ORDER — SODIUM CHLORIDE 9 MG/ML
INJECTION, SOLUTION INTRAVENOUS CONTINUOUS
Status: DISCONTINUED | OUTPATIENT
Start: 2018-08-06 | End: 2018-08-06 | Stop reason: HOSPADM

## 2018-08-06 RX ORDER — PROPOFOL 10 MG/ML
INJECTION, EMULSION INTRAVENOUS PRN
Status: DISCONTINUED | OUTPATIENT
Start: 2018-08-06 | End: 2018-08-06 | Stop reason: SDUPTHER

## 2018-08-06 RX ORDER — ONDANSETRON 2 MG/ML
4 INJECTION INTRAMUSCULAR; INTRAVENOUS
Status: DISCONTINUED | OUTPATIENT
Start: 2018-08-06 | End: 2018-08-06 | Stop reason: HOSPADM

## 2018-08-06 RX ADMIN — SODIUM CHLORIDE: 9 INJECTION, SOLUTION INTRAVENOUS at 09:14

## 2018-08-06 RX ADMIN — PROPOFOL 30 MG: 10 INJECTION, EMULSION INTRAVENOUS at 09:26

## 2018-08-06 RX ADMIN — PROPOFOL 30 MG: 10 INJECTION, EMULSION INTRAVENOUS at 09:33

## 2018-08-06 RX ADMIN — PROPOFOL 50 MG: 10 INJECTION, EMULSION INTRAVENOUS at 09:24

## 2018-08-06 RX ADMIN — PROPOFOL 20 MG: 10 INJECTION, EMULSION INTRAVENOUS at 09:29

## 2018-08-06 ASSESSMENT — PAIN - FUNCTIONAL ASSESSMENT: PAIN_FUNCTIONAL_ASSESSMENT: 0-10

## 2018-08-06 NOTE — ANESTHESIA PRE PROCEDURE
Department of Anesthesiology  Preprocedure Note       Name:  Marck Julian   Age:  70 y.o.  :  1950                                          MRN:  [id number]         Date:  2018      Surgeon: Dioni Bocanegra):  Genet Rosales MD    Procedure: Procedure(s):  COLONOSCOPY    Medications prior to admission:   Prior to Admission medications    Medication Sig Start Date End Date Taking?  Authorizing Provider   pantoprazole (PROTONIX) 40 MG tablet TAKE ONE TABLET BY MOUTH EVERY DAY 5/3/18   Theo Sutherland MD   alendronate (FOSAMAX) 70 MG tablet take 1 tablet by mouth every 7 days 18   Theo Sutherland MD   sodium chloride 0.9 % irrigation Irrigate with 1 mL as directed 2 times daily 17   Historical Provider, MD   mupirocin (BACTROBAN) 2 % ointment Apply 2 Act topically 2 times daily 17   Historical Provider, MD   albuterol sulfate  (90 Base) MCG/ACT inhaler Inhale 2 puffs into the lungs every 6 hours as needed for Wheezing 10/16/17   Alta Montanez APRN - CNP   COMBIVENT RESPIMAT  MCG/ACT AERS inhaler INHALE 1 PUFF FOUR TIMES A DAY AS NEEDED 17   Historical Provider, MD   fluticasone (FLONASE) 50 MCG/ACT nasal spray 2 sprays by Nasal route daily 17   [facility] Hospital[facility]ERT[facility] Keating[facility] - CNP   Vitamin D (CHOLECALCIFEROL) 1000 UNITS CAPS capsule Take 1,000 Units by mouth daily    Historical Provider, MD   Multiple Vitamin (MULTI VITAMIN DAILY PO) Take by mouth    Historical Provider, MD   anagrelide (AGRYLIN) 0.5 MG capsule 0.5 mg 2 times daily  8/17/15   Historical Provider, MD   digoxin (LANOXIN) 125 MCG tablet  6/5/15   Historical Provider, MD   verapamil (CALAN-SR) 120 MG CR tablet  6/5/15   Historical Provider, MD       Current medications:    Current Facility-Administered Medications   Medication Dose Route Frequency Provider Last Rate Last Dose    0.9 % sodium chloride infusion   Intravenous Continuous Genet Rosales MD        ondansetron [facility] injection 4 12/26/1982    Smokeless tobacco: Never Used      Comment: quit 34 years ago    Alcohol use No                                Counseling given: Not Answered      Vital Signs (Current): There were no vitals filed for this visit. BP Readings from Last 3 Encounters:   07/24/18 128/82   04/24/18 120/62   03/29/18 118/60       NPO Status:                                                                                 BMI:   Wt Readings from Last 3 Encounters:   07/24/18 112 lb 3.2 oz (50.9 kg)   04/24/18 112 lb 3.2 oz (50.9 kg)   03/29/18 113 lb (51.3 kg)     There is no height or weight on file to calculate BMI.    CBC:   Lab Results   Component Value Date    WBC 9.4 10/01/2016    RBC 4.39 10/01/2016    RBC 4.59 10/06/2011    HGB 11.6 10/01/2016    HCT 35.5 10/01/2016    MCV 80.7 10/01/2016    RDW 14.4 10/01/2016     10/01/2016       CMP:   Lab Results   Component Value Date     12/22/2017    K 5.0 12/22/2017    CL 97 12/22/2017    CO2 26 12/22/2017    BUN 22 12/22/2017    CREATININE 0.62 12/22/2017    GFRAA >60.0 12/22/2017    LABGLOM >60.0 12/22/2017    GLUCOSE 91 12/22/2017    GLUCOSE 112 10/06/2011    PROT 6.1 12/22/2017    CALCIUM 9.1 12/22/2017    BILITOT 0.3 12/22/2017    ALKPHOS 53 12/22/2017    AST 10 12/22/2017    ALT 7 12/22/2017       POC Tests: No results for input(s): POCGLU, POCNA, POCK, POCCL, POCBUN, POCHEMO, POCHCT in the last 72 hours.     Coags:   Lab Results   Component Value Date    PROTIME 9.4 02/24/2016    INR 0.9 02/24/2016    APTT 28.8 02/24/2016       HCG (If Applicable): No results found for: PREGTESTUR, PREGSERUM, HCG, HCGQUANT     ABGs:   Lab Results   Component Value Date    PHART 7.471 12/01/2014    PO2ART 66 12/01/2014    BMK7YYJ 34 12/01/2014    TNI3PEI 24.7 12/01/2014    BEART 1 12/01/2014    Q9FZLZPR 94 12/01/2014        Type & Screen (If Applicable):  No results found for: [facility]    Anesthesia Evaluation  Patient summary

## 2018-08-08 ENCOUNTER — OFFICE VISIT (OUTPATIENT)
Dept: PULMONOLOGY | Age: 68
End: 2018-08-08
Payer: MEDICARE

## 2018-08-08 VITALS
OXYGEN SATURATION: 98 % | HEART RATE: 73 BPM | RESPIRATION RATE: 16 BRPM | SYSTOLIC BLOOD PRESSURE: 120 MMHG | BODY MASS INDEX: 21.99 KG/M2 | TEMPERATURE: 98 F | HEIGHT: 60 IN | DIASTOLIC BLOOD PRESSURE: 60 MMHG | WEIGHT: 112 LBS

## 2018-08-08 DIAGNOSIS — J44.9 CHRONIC OBSTRUCTIVE PULMONARY DISEASE, UNSPECIFIED COPD TYPE (HCC): Primary | ICD-10-CM

## 2018-08-08 DIAGNOSIS — J32.9 RECURRENT SINUSITIS: ICD-10-CM

## 2018-08-08 PROCEDURE — 99214 OFFICE O/P EST MOD 30 MIN: CPT | Performed by: INTERNAL MEDICINE

## 2018-08-08 ASSESSMENT — ENCOUNTER SYMPTOMS
CHEST TIGHTNESS: 0
ABDOMINAL PAIN: 0
EYE ITCHING: 0
SHORTNESS OF BREATH: 0
DIARRHEA: 0
VOICE CHANGE: 0
RHINORRHEA: 0
COUGH: 0
WHEEZING: 0
SORE THROAT: 0
VOMITING: 0
TROUBLE SWALLOWING: 0
EYE DISCHARGE: 0
SINUS PRESSURE: 0
NAUSEA: 0

## 2018-08-08 NOTE — PROGRESS NOTES
Subjective:     Dick Redd is a 70 y.o. female who complains today of:     Chief Complaint   Patient presents with    Follow-up     four month f/u for Chest Xray and PFT results. HPI   CXR and  PFT done   Patient is not using any inhaler regularly , she said she ids doing better. She has combivent respimat 1 puff QID prn   No C/o shortness of breath   No Wheezing   No Cough or  Sputum  No Hemoptysis  No Chest tightness   No Chest pain with radiation  or pleuritic pain  No Fever or chills. No Rhinorrhea but c/o  postnasal drip. She said she has recurrent sinus infection but doing better now. Allergies:  Lorazepam; Penicillins; Rocephin [ceftriaxone]; Thiopental; Vancomycin; and Asa [aspirin]  Past Medical History:   Diagnosis Date    Atrial fibrillation (HCC)     Chronic anemia     Chronic rhinitis     DR. NAPIER    Chronic sinusitis     Colon polyp 06/03/2015    DR Monik Guerrero    Diverticulosis of colon (without mention of hemorrhage) 06/03/2015    DR Monik Guerrero    Lung disease     Lung mass     was suspicious for malignancy, but path negative.  MVP (mitral valve prolapse) 5/28/2014    Recurrent sinusitis     DR. NAPIER    Thrombocytosis ([facility]     Thyroid nodule      Past Surgical History:   Procedure Laterality Date    BRONCHOSCOPY      CATARACT REMOVAL WITH IMPLANT [facility] 03/18/16    [facility]    CATARACT REMOVAL WITH IMPLANT Right 04/22/16    [facility] K[facility] MD    COLONOSCOPY  06/03/2015    DR Monik Guerrero - DIVERTICULOSIS, POLYPS    HYSTERECTOMY      AL COLORECTAL SCRN; HI RISK IND N/A 8/6/2018    COLONOSCOPY performed by Moraima Gr MD at [address] [place] [address] Left 12/16/2016    [facility] PEDRO LUIS TELLO MD      THYROIDECTOMY, PARTIAL  11/18/14    PATRICIA CHACON MD    UPPER GASTROINTESTINAL ENDOSCOPY  7/13/15    w/bx      History reviewed. No pertinent family history.   Social History     Social History    Marital status:      Spouse name: N/A   Oren Castillo Number of children: N/A    Years of education: N/A     Occupational History    Not on file. Social History Main Topics    Smoking status: Former Smoker     Packs/day: 0.25     Years: 8.00     Types: Cigarettes     Quit date: 12/26/1982    Smokeless tobacco: Never Used      Comment: quit 34 years ago    Alcohol use No    Drug use: No    Sexual activity: No     Other Topics Concern    Not on file     Social History Narrative    No narrative on file         Review of Systems   Constitutional: Negative for chills, diaphoresis, fatigue and fever. HENT: Positive for postnasal drip. Negative for congestion, mouth sores, nosebleeds, rhinorrhea, sinus pressure, sneezing, sore throat, trouble swallowing and voice change. Eyes: Negative for discharge, itching and visual disturbance. Respiratory: Negative for cough, chest tightness, shortness of breath and wheezing. Cardiovascular: Negative for chest pain, palpitations and leg swelling. Gastrointestinal: Negative for abdominal pain, diarrhea, nausea and vomiting. Genitourinary: Negative for difficulty urinating, genital sores and hematuria. Musculoskeletal: Negative for arthralgias, joint swelling and myalgias. Skin: Negative for rash. Allergic/Immunologic: Negative for environmental allergies and food allergies. Neurological: Negative for dizziness, tremors, weakness and headaches. Psychiatric/Behavioral: Negative for behavioral problems and sleep disturbance. Objective:     Vitals:    08/08/18 1259   BP: 120/60   Pulse: 73   Resp: 16   Temp: 98 °F (36.7 °C)   TempSrc: Tympanic   SpO2: 98%   Weight: 112 lb (50.8 kg)   Height: 5' (1.524 m)         Physical Exam   Constitutional: She is oriented to person, place, and time. She appears well-developed and well-nourished. No distress. HENT:   Head: Normocephalic and atraumatic.    Nose: Nose normal.   Mouth/Throat: Oropharynx is clear and moist.   Eyes: EOM are normal. Pupils are equal, XR CHEST STANDARD (2 VW)    Narrative EXAMINATION: CHEST TWO VIEWS     CLINICAL HISTORY: J44.9 Chronic obstructive pulmonary disease, unspecified COPD type ([address] ICD10    COMPARISONS: 2017     FINDINGS:    Two views of the chest are submitted. The cardiac silhouette is of normal size configuration. The mediastinum is unremarkable. Pulmonary vascular is attenuated, lungs are hyperinflated and there is some widening of the AP diameter the chest and coarsening of the interstitium. Right sided trachea there are small areas atelectasis in both bases. .  No focal infiltrates. No effusions. Pneumothoraces. Impression NO ACUTE ACTIVE CARDIOPULMONARY PROCESS. RADIOGRAPHIC FINDINGS SUGGESTIVE OF COPD. CORRELATE CLINICALLY.    ]  ]PULMONARY FUNCTION     PATIENT NAME: Meli Zhu                     :        1950  MED REC NO:   [id number]                            ROOM:  ACCOUNT NO:   [de-identified]                           ADMIT DATE: 2018  PROVIDER:     Mima Farrell MD     DATE OF PROCEDURE:  2018     PFTs were done on this 23-year-old patient, who is 5 feet tall, weighs 112  pounds, with 10-year smoking history of few cigarettes a day, but quit 34  years ago, presenting with dyspnea and cough.     Spirometry showed a forced vital capacity of 1.88 L, which is 73% of  predicted. FEV1 was 1.41 L, which is 72% of predicted. FEV1/FVC ratio was  within normal limits at 75%. FEF 25-75% was moderately reduced to 1.09 L  per second, which is 63% of predicted. Minimal improvement noted in FEV1  and in terminal airflow after bronchodilator therapy. MVV was adequate.     Lung volumes done by body plethysmography showed a normal total lung  capacity of 4.25 L, which is 95% of predicted. Residual volume was 2.29 L,  which is 116% of predicted with increased RV/TLC ratio at 54%.      Diffusion capacity was mildly reduced to 13.7, which is 72% of predicted.     Airway resistance was mildly increased. Airway conductance was decreased.     OVERALL IMPRESSION:  This study is consistent with mild obstructive  ventilatory impairment with borderline reversibility after bronchodilator  therapy, associated with mild overinflation and mild diffusion impairment.           Rafaela Rocha MD     D: 01/31/2018 10:53:26       T: 01/31/2018 13:27:46     [id number]  Job#: [id number]     Doc#: [id number]     CC:    Assessment/Plan:     1. Chronic obstructive pulmonary disease, unspecified COPD type ([facility]  Patient had PFT done which showed mild obstructive pulmonary disease with borderline reversibility. Chest x-rays was changes of COPD no active disease. chest x-ray and PFT reviewed. She is on combivent respimat 1 puff 4 times a day when necessary. Patient said she is significant improvement in her shortness of breath and she is rarely using an hour    2. Recurrent sinusitis  c/o  postnasal drip. She said she has recurrent sinus infection but doing better now. Return in about 6 months (around 2/8/2019) for COPD.       Ana Maria Tirado MD

## 2018-08-10 NOTE — CARE COORDINATION
Ambulatory Care Coordination Note  8/10/2018  CM Risk Score: 2  Edwin Mortality Risk Score: 7.11    ACC: Gerson Enriquez RN    Summary Note: I called to check in on Uli Szymanski and to discuss COPD. She states that she is doing well. She states that she just saw Dr Abran Hernandez and he feels that she is doing well. She denies any cough mucus, fever, or chills. She states that she is not short of breath with or without activity. She denies the need to use her rescue inhaler. She also states that she has had her follow up from all of her testing with Dr Demi Bardales and her she says that her testing came back normal and she does not need to follow up with her for a year. She also completed her colonoscopy and she says that she had a polyp but overall everything was good. She is doing well with managing her health. She is compliant with medications, testing, and appointments. I feel that she is ready for graduation. I will check with Dr Janet Matthews to obtain his input. COPD Assessment    Does the patient understand envrionmental exposure?:  Yes  Is the patient able to verbalize Rescue vs. Long Acting medications?:  Yes  Does the patient have a nebulizer?:  No  Does the patient use a space with inhaled medications?:  No     No patient-reported symptoms         Symptoms:               Care Coordination Interventions    Program Enrollment:  Rising Risk  Referral from Primary Care Provider:  No  Suggested Interventions and Community Resources  Disease Association: In Process  Zone Management Tools: In Process         Goals Addressed             Most Recent     COMPLETED: Conditions and Symptoms   On track (6/4/2018)             I will schedule office visits, as directed by my provider. I will keep my appointment or reschedule if I have to cancel. I will notify my provider of any barriers to my plan of care. I will follow my Zone Management tool to seek urgent or emergent care.   I will notify my provider of any symptoms that indicate a worsening of my condition. Barriers: none  Plan for overcoming my barriers: N/A  Confidence: 10/10  Anticipated Goal Completion Date: 10/15/2017                Prior to Admission medications    Medication Sig Start Date End Date Taking?  Authorizing Provider   pantoprazole (PROTONIX) 40 MG tablet TAKE ONE TABLET BY MOUTH EVERY DAY 5/3/18   Jose Marques MD   alendronate (FOSAMAX) 70 MG tablet take 1 tablet by mouth every 7 days 4/23/18   Jose Marques MD   sodium chloride 0.9 % irrigation Irrigate with 1 mL as directed 2 times daily 11/30/17   Historical Provider, MD   mupirocin (BACTROBAN) 2 % ointment Apply 2 Act topically 2 times daily 11/30/17   Historical Provider, MD   albuterol sulfate  (90 Base) MCG/ACT inhaler Inhale 2 puffs into the lungs every 6 hours as needed for Wheezing 10/16/17   TREY Laurent CNP   COMBIVENT RESPIMAT  MCG/ACT AERS inhaler INHALE 1 PUFF FOUR TIMES A DAY AS NEEDED 6/27/17   Historical Provider, MD   fluticasone (FLONASE) 50 MCG/ACT nasal spray 2 sprays by Nasal route daily 8/30/17   [facility] TREY Acosta CNP   Vitamin D (CHOLECALCIFEROL) 1000 UNITS CAPS capsule Take 1,000 Units by mouth daily    Historical Provider, MD   Multiple Vitamin (MULTI VITAMIN DAILY PO) Take by mouth    Historical Provider, MD   anagrelide (AGRYLIN) 0.5 MG capsule 0.5 mg 2 times daily  8/17/15   Historical Provider, MD   digoxin (LANOXIN) 125 MCG tablet  6/5/15   Historical Provider, MD   verapamil (CALAN-SR) 120 MG CR tablet  6/5/15   Historical Provider, MD       Future Appointments  Date Time Provider Alexandru Limon   1/24/2019 11:00 AM Jose Marques MD [facility]   2/12/2019 9:00 AM Violette Queen MD [facility]

## 2018-08-18 ENCOUNTER — APPOINTMENT (OUTPATIENT)
Dept: GENERAL RADIOLOGY | Age: 68
End: 2018-08-18
Payer: MEDICARE

## 2018-08-18 ENCOUNTER — HOSPITAL ENCOUNTER (EMERGENCY)
Age: 68
Discharge: HOME | End: 2018-08-18
Payer: MEDICARE

## 2018-08-18 VITALS
BODY MASS INDEX: 21.99 KG/M2 | HEART RATE: 64 BPM | OXYGEN SATURATION: 100 % | SYSTOLIC BLOOD PRESSURE: 131 MMHG | TEMPERATURE: 97.4 F | DIASTOLIC BLOOD PRESSURE: 73 MMHG | WEIGHT: 112 LBS | HEIGHT: 60 IN | RESPIRATION RATE: 18 BRPM

## 2018-08-18 DIAGNOSIS — M25.512 ACUTE PAIN OF LEFT SHOULDER: Primary | ICD-10-CM

## 2018-08-18 LAB
EKG ATRIAL RATE: 66 BPM
EKG P AXIS: 60 DEGREES
EKG P-R INTERVAL: 158 MS
EKG Q-T INTERVAL: 418 MS
EKG QRS DURATION: 88 MS
EKG QTC CALCULATION (BAZETT): 438 MS
EKG R AXIS: 12 DEGREES
EKG T AXIS: 60 DEGREES
EKG VENTRICULAR RATE: 66 BPM

## 2018-08-18 PROCEDURE — 73030 X-RAY EXAM OF SHOULDER: CPT

## 2018-08-18 PROCEDURE — 93005 ELECTROCARDIOGRAM TRACING: CPT

## 2018-08-18 PROCEDURE — 99283 EMERGENCY DEPT VISIT LOW MDM: CPT

## 2018-08-18 RX ORDER — ACETAMINOPHEN 500 MG
1000 TABLET ORAL ONCE
Status: DISCONTINUED | OUTPATIENT
Start: 2018-08-18 | End: 2018-08-18 | Stop reason: HOSPADM

## 2018-08-18 RX ORDER — ACETAMINOPHEN 500 MG
500 TABLET ORAL EVERY 6 HOURS PRN
Qty: 16 TABLET | Refills: 0 | Status: SHIPPED | OUTPATIENT
Start: 2018-08-18 | End: 2020-01-01

## 2018-08-18 ASSESSMENT — ENCOUNTER SYMPTOMS
SHORTNESS OF BREATH: 0
ABDOMINAL PAIN: 0
NAUSEA: 0
VOMITING: 0

## 2018-08-18 ASSESSMENT — PAIN SCALES - GENERAL
PAINLEVEL_OUTOF10: 7
PAINLEVEL_OUTOF10: 7

## 2018-08-18 ASSESSMENT — PAIN DESCRIPTION - DESCRIPTORS: DESCRIPTORS: ACHING

## 2018-08-18 ASSESSMENT — PAIN DESCRIPTION - PAIN TYPE: TYPE: ACUTE PAIN

## 2018-08-18 ASSESSMENT — PAIN DESCRIPTION - ORIENTATION: ORIENTATION: LEFT

## 2018-08-20 ENCOUNTER — TELEPHONE (OUTPATIENT)
Dept: FAMILY MEDICINE CLINIC | Age: 68
End: 2018-08-20

## 2018-08-20 PROCEDURE — 93010 ELECTROCARDIOGRAM REPORT: CPT | Performed by: INTERNAL MEDICINE

## 2018-10-01 ENCOUNTER — OFFICE VISIT (OUTPATIENT)
Dept: FAMILY MEDICINE CLINIC | Age: 68
End: 2018-10-01
Payer: MEDICARE

## 2018-10-01 VITALS
OXYGEN SATURATION: 98 % | WEIGHT: 110.4 LBS | DIASTOLIC BLOOD PRESSURE: 58 MMHG | HEART RATE: 96 BPM | RESPIRATION RATE: 16 BRPM | BODY MASS INDEX: 21.68 KG/M2 | SYSTOLIC BLOOD PRESSURE: 110 MMHG | HEIGHT: 60 IN | TEMPERATURE: 98.1 F

## 2018-10-01 DIAGNOSIS — J32.9 RECURRENT SINUSITIS: Primary | ICD-10-CM

## 2018-10-01 DIAGNOSIS — R05.9 COUGH: ICD-10-CM

## 2018-10-01 DIAGNOSIS — Z23 NEED FOR INFLUENZA VACCINATION: ICD-10-CM

## 2018-10-01 PROCEDURE — 99213 OFFICE O/P EST LOW 20 MIN: CPT | Performed by: NURSE PRACTITIONER

## 2018-10-01 PROCEDURE — 90662 IIV NO PRSV INCREASED AG IM: CPT | Performed by: NURSE PRACTITIONER

## 2018-10-01 PROCEDURE — G0008 ADMIN INFLUENZA VIRUS VAC: HCPCS | Performed by: NURSE PRACTITIONER

## 2018-10-01 PROCEDURE — 3288F FALL RISK ASSESSMENT DOCD: CPT | Performed by: NURSE PRACTITIONER

## 2018-10-01 RX ORDER — AZITHROMYCIN 250 MG/1
TABLET, FILM COATED ORAL
Qty: 1 PACKET | Refills: 0 | Status: SHIPPED | OUTPATIENT
Start: 2018-10-01 | End: 2019-01-01 | Stop reason: SDUPTHER

## 2018-10-01 RX ORDER — BENZONATATE 100 MG/1
CAPSULE ORAL
Qty: 30 CAPSULE | Refills: 0 | Status: SHIPPED | OUTPATIENT
Start: 2018-10-01 | End: 2019-02-12 | Stop reason: ALTCHOICE

## 2018-10-01 ASSESSMENT — ENCOUNTER SYMPTOMS
DIARRHEA: 0
SHORTNESS OF BREATH: 0
EYE ITCHING: 0
SORE THROAT: 1
HEARTBURN: 0
WHEEZING: 1
EYE REDNESS: 0
EYE DISCHARGE: 0
VOICE CHANGE: 0
VOMITING: 0
COUGH: 1
SINUS PAIN: 0
NAUSEA: 0
TROUBLE SWALLOWING: 0
PHOTOPHOBIA: 0
FACIAL SWELLING: 0
SINUS PRESSURE: 1
RHINORRHEA: 1
CHEST TIGHTNESS: 0
EYE PAIN: 0
STRIDOR: 0
HEMOPTYSIS: 0

## 2018-10-01 NOTE — PATIENT INSTRUCTIONS
Antibiotic Instructions: Complete the full course of antibiotics as ordered. Take each dose with a small snack or meal to lessen potential GI upset. To prevent antibiotic resistance, please take medication as ordered and for the full duration even if you start to feel better. Consider intake of yogurt or probiotic during antibiotic use and for a few days after to help reduce the risk of developing a secondary infection. Separate the yogurt and antibiotic by at least 1 hour. Avoid alcohol while taking antibiotics.

## 2018-10-01 NOTE — PROGRESS NOTES
Today: No results found for this visit on 10/01/18. Assessment & Plan    Diagnosis Orders   1. Recurrent sinusitis  azithromycin (ZITHROMAX) 250 MG tablet   2. Cough  benzonatate (TESSALON) 100 MG capsule   3. Need for influenza vaccination  INFLUENZA, HIGH DOSE, 65 YRS +, IM, PF, PREFILL SYR, 0.5ML (FLUZONE HD)       Orders Placed This Encounter   Procedures    INFLUENZA, HIGH DOSE, 65 YRS +, IM, PF, PREFILL SYR, 0.5ML (FLUZONE HD)     Antibiotic Instructions: Complete the full course of antibiotics as ordered. Take each dose with a small snack or meal to lessen potential GI upset. To prevent antibiotic resistance, please take medication as ordered and for the full duration even if you start to feel better. Consider intake of yogurt or probiotic during antibiotic use and for a few days after to help reduce the risk of developing a secondary infection. Separate the yogurt and antibiotic by at least 1 hour. Avoid alcohol while taking antibiotics. Return if symptoms worsen or fail to improve, for follow-up with PCP. Side effects and adverse effects of any medication prescribed today, as well as treatment plan/rationale, follow-up care, and result expectations have been discussed with the patient. Expresses understanding and desires to proceed with treatment plan. Discussed signs and symptoms which require immediate follow-up in ED/call to 911. Understanding verbalized. I have reviewed and updated the electronic medical record.     TREY Plunkett - DOYLE

## 2018-11-07 RX ORDER — PANTOPRAZOLE SODIUM 40 MG/1
TABLET, DELAYED RELEASE ORAL
Qty: 30 TABLET | Refills: 4 | Status: SHIPPED | OUTPATIENT
Start: 2018-11-07 | End: 2019-04-01 | Stop reason: SDUPTHER

## 2018-11-27 ENCOUNTER — CARE COORDINATION (OUTPATIENT)
Dept: CARE COORDINATION | Age: 68
End: 2018-11-27

## 2018-12-04 ENCOUNTER — CARE COORDINATION (OUTPATIENT)
Dept: CARE COORDINATION | Age: 68
End: 2018-12-04

## 2018-12-21 PROBLEM — N18.30 CHRONIC RENAL FAILURE, STAGE 3 (MODERATE) (HCC): Status: ACTIVE | Noted: 2018-12-21

## 2019-01-01 ENCOUNTER — APPOINTMENT (OUTPATIENT)
Dept: GENERAL RADIOLOGY | Age: 69
DRG: 853 | End: 2019-01-01
Payer: MEDICARE

## 2019-01-01 ENCOUNTER — HOSPITAL ENCOUNTER (INPATIENT)
Age: 69
LOS: 3 days | Discharge: HOME | DRG: 153 | End: 2019-10-28
Attending: EMERGENCY MEDICINE | Admitting: INTERNAL MEDICINE
Payer: MEDICARE

## 2019-01-01 ENCOUNTER — APPOINTMENT (OUTPATIENT)
Dept: CT IMAGING | Age: 69
DRG: 853 | End: 2019-01-01
Payer: MEDICARE

## 2019-01-01 ENCOUNTER — HOSPITAL ENCOUNTER (EMERGENCY)
Age: 69
Discharge: HOME | End: 2019-09-14
Attending: EMERGENCY MEDICINE
Payer: MEDICARE

## 2019-01-01 ENCOUNTER — HOSPITAL ENCOUNTER (OUTPATIENT)
Dept: GENERAL RADIOLOGY | Age: 69
Discharge: HOME | End: 2019-08-08
Payer: MEDICARE

## 2019-01-01 ENCOUNTER — CARE COORDINATION (OUTPATIENT)
Dept: CASE MANAGEMENT | Age: 69
End: 2019-01-01

## 2019-01-01 ENCOUNTER — OFFICE VISIT (OUTPATIENT)
Dept: FAMILY MEDICINE CLINIC | Age: 69
End: 2019-01-01
Payer: MEDICARE

## 2019-01-01 ENCOUNTER — APPOINTMENT (OUTPATIENT)
Dept: GENERAL RADIOLOGY | Age: 69
DRG: 871 | End: 2019-01-01
Payer: MEDICARE

## 2019-01-01 ENCOUNTER — TELEPHONE (OUTPATIENT)
Dept: OTHER | Facility: CLINIC | Age: 69
End: 2019-01-01

## 2019-01-01 ENCOUNTER — OFFICE VISIT (OUTPATIENT)
Dept: PULMONOLOGY | Age: 69
End: 2019-01-01
Payer: MEDICARE

## 2019-01-01 ENCOUNTER — APPOINTMENT (OUTPATIENT)
Dept: CT IMAGING | Age: 69
DRG: 153 | End: 2019-01-01
Payer: MEDICARE

## 2019-01-01 ENCOUNTER — TELEPHONE (OUTPATIENT)
Dept: FAMILY MEDICINE CLINIC | Age: 69
End: 2019-01-01

## 2019-01-01 ENCOUNTER — APPOINTMENT (OUTPATIENT)
Dept: GENERAL RADIOLOGY | Age: 69
DRG: 153 | End: 2019-01-01
Payer: MEDICARE

## 2019-01-01 ENCOUNTER — APPOINTMENT (OUTPATIENT)
Dept: CT IMAGING | Age: 69
End: 2019-01-01
Payer: MEDICARE

## 2019-01-01 ENCOUNTER — HOSPITAL ENCOUNTER (OUTPATIENT)
Dept: PULMONOLOGY | Age: 69
Discharge: HOME | End: 2019-08-06
Payer: MEDICARE

## 2019-01-01 ENCOUNTER — HOSPITAL ENCOUNTER (INPATIENT)
Age: 69
LOS: 13 days | Discharge: HOME | DRG: 853 | End: 2019-12-18
Attending: EMERGENCY MEDICINE | Admitting: INTERNAL MEDICINE
Payer: MEDICARE

## 2019-01-01 ENCOUNTER — HOSPITAL ENCOUNTER (INPATIENT)
Age: 69
LOS: 5 days | Discharge: HOME | DRG: 871 | End: 2019-11-19
Attending: EMERGENCY MEDICINE | Admitting: INTERNAL MEDICINE
Payer: MEDICARE

## 2019-01-01 VITALS
HEIGHT: 60 IN | OXYGEN SATURATION: 98 % | DIASTOLIC BLOOD PRESSURE: 62 MMHG | TEMPERATURE: 98.8 F | WEIGHT: 111.4 LBS | HEART RATE: 79 BPM | SYSTOLIC BLOOD PRESSURE: 114 MMHG | BODY MASS INDEX: 21.87 KG/M2

## 2019-01-01 VITALS
RESPIRATION RATE: 18 BRPM | OXYGEN SATURATION: 98 % | HEIGHT: 60 IN | DIASTOLIC BLOOD PRESSURE: 56 MMHG | WEIGHT: 107 LBS | SYSTOLIC BLOOD PRESSURE: 135 MMHG | TEMPERATURE: 98.2 F | HEART RATE: 73 BPM | BODY MASS INDEX: 21.01 KG/M2

## 2019-01-01 VITALS
RESPIRATION RATE: 17 BRPM | SYSTOLIC BLOOD PRESSURE: 95 MMHG | OXYGEN SATURATION: 99 % | WEIGHT: 105.3 LBS | HEART RATE: 86 BPM | BODY MASS INDEX: 20.67 KG/M2 | TEMPERATURE: 97.9 F | DIASTOLIC BLOOD PRESSURE: 46 MMHG | HEIGHT: 60 IN

## 2019-01-01 VITALS
SYSTOLIC BLOOD PRESSURE: 122 MMHG | HEIGHT: 60 IN | DIASTOLIC BLOOD PRESSURE: 68 MMHG | HEART RATE: 67 BPM | OXYGEN SATURATION: 99 % | WEIGHT: 113 LBS | BODY MASS INDEX: 22.19 KG/M2

## 2019-01-01 VITALS
HEART RATE: 100 BPM | TEMPERATURE: 98.2 F | WEIGHT: 105.4 LBS | DIASTOLIC BLOOD PRESSURE: 70 MMHG | OXYGEN SATURATION: 96 % | SYSTOLIC BLOOD PRESSURE: 162 MMHG | BODY MASS INDEX: 20.69 KG/M2 | RESPIRATION RATE: 18 BRPM | HEIGHT: 60 IN

## 2019-01-01 VITALS
BODY MASS INDEX: 22.19 KG/M2 | WEIGHT: 113 LBS | HEIGHT: 60 IN | RESPIRATION RATE: 13 BRPM | DIASTOLIC BLOOD PRESSURE: 70 MMHG | SYSTOLIC BLOOD PRESSURE: 143 MMHG | HEART RATE: 53 BPM | OXYGEN SATURATION: 98 % | TEMPERATURE: 97.6 F

## 2019-01-01 VITALS
TEMPERATURE: 98.2 F | DIASTOLIC BLOOD PRESSURE: 62 MMHG | HEART RATE: 72 BPM | SYSTOLIC BLOOD PRESSURE: 118 MMHG | WEIGHT: 113 LBS | BODY MASS INDEX: 22.19 KG/M2 | HEIGHT: 60 IN | RESPIRATION RATE: 16 BRPM | OXYGEN SATURATION: 98 %

## 2019-01-01 VITALS
HEART RATE: 78 BPM | DIASTOLIC BLOOD PRESSURE: 60 MMHG | HEIGHT: 60 IN | OXYGEN SATURATION: 98 % | RESPIRATION RATE: 16 BRPM | BODY MASS INDEX: 21.99 KG/M2 | SYSTOLIC BLOOD PRESSURE: 100 MMHG | WEIGHT: 112 LBS | TEMPERATURE: 98.8 F

## 2019-01-01 VITALS
OXYGEN SATURATION: 98 % | HEIGHT: 60 IN | BODY MASS INDEX: 20.85 KG/M2 | WEIGHT: 106.2 LBS | DIASTOLIC BLOOD PRESSURE: 60 MMHG | SYSTOLIC BLOOD PRESSURE: 98 MMHG | HEART RATE: 79 BPM | TEMPERATURE: 97.7 F

## 2019-01-01 VITALS
BODY MASS INDEX: 22.38 KG/M2 | HEART RATE: 64 BPM | DIASTOLIC BLOOD PRESSURE: 62 MMHG | WEIGHT: 114 LBS | HEIGHT: 60 IN | SYSTOLIC BLOOD PRESSURE: 120 MMHG | OXYGEN SATURATION: 97 %

## 2019-01-01 DIAGNOSIS — J44.1 COPD EXACERBATION (HCC): Primary | ICD-10-CM

## 2019-01-01 DIAGNOSIS — K57.32 DIVERTICULITIS OF COLON: ICD-10-CM

## 2019-01-01 DIAGNOSIS — D75.839 THROMBOCYTOSIS: ICD-10-CM

## 2019-01-01 DIAGNOSIS — J98.4 PULMONARY CAVITARY LESION: ICD-10-CM

## 2019-01-01 DIAGNOSIS — N18.30 CHRONIC RENAL FAILURE, STAGE 3 (MODERATE) (HCC): ICD-10-CM

## 2019-01-01 DIAGNOSIS — I48.91 ATRIAL FIBRILLATION, UNSPECIFIED TYPE (HCC): Primary | ICD-10-CM

## 2019-01-01 DIAGNOSIS — I48.91 ATRIAL FIBRILLATION, UNSPECIFIED TYPE (HCC): ICD-10-CM

## 2019-01-01 DIAGNOSIS — J44.9 CHRONIC OBSTRUCTIVE PULMONARY DISEASE, UNSPECIFIED COPD TYPE (HCC): ICD-10-CM

## 2019-01-01 DIAGNOSIS — R11.10 VOMITING AND DIARRHEA: ICD-10-CM

## 2019-01-01 DIAGNOSIS — J20.9 ACUTE BRONCHITIS, UNSPECIFIED ORGANISM: Primary | ICD-10-CM

## 2019-01-01 DIAGNOSIS — J18.9 PNEUMONIA DUE TO ORGANISM: ICD-10-CM

## 2019-01-01 DIAGNOSIS — R09.82 POST-NASAL DRIP: ICD-10-CM

## 2019-01-01 DIAGNOSIS — I47.1 SUPRAVENTRICULAR TACHYCARDIA (HCC): ICD-10-CM

## 2019-01-01 DIAGNOSIS — A41.9 SEPSIS WITH ACUTE RENAL FAILURE WITHOUT SEPTIC SHOCK, DUE TO UNSPECIFIED ORGANISM, UNSPECIFIED ACUTE RENAL FAILURE TYPE (HCC): Primary | ICD-10-CM

## 2019-01-01 DIAGNOSIS — K57.32 DIVERTICULITIS OF COLON: Primary | ICD-10-CM

## 2019-01-01 DIAGNOSIS — J44.9 CHRONIC OBSTRUCTIVE PULMONARY DISEASE, UNSPECIFIED COPD TYPE (HCC): Primary | ICD-10-CM

## 2019-01-01 DIAGNOSIS — Z91.81 AT HIGH RISK FOR FALLS: ICD-10-CM

## 2019-01-01 DIAGNOSIS — J34.2 DEVIATED SEPTUM: ICD-10-CM

## 2019-01-01 DIAGNOSIS — D64.9 ANEMIA, UNSPECIFIED TYPE: ICD-10-CM

## 2019-01-01 DIAGNOSIS — J20.8 ACUTE BRONCHITIS DUE TO OTHER SPECIFIED ORGANISMS: ICD-10-CM

## 2019-01-01 DIAGNOSIS — R05.9 COUGH: ICD-10-CM

## 2019-01-01 DIAGNOSIS — J32.0 CHRONIC MAXILLARY SINUSITIS: Primary | ICD-10-CM

## 2019-01-01 DIAGNOSIS — I95.9 HYPOTENSION, UNSPECIFIED HYPOTENSION TYPE: ICD-10-CM

## 2019-01-01 DIAGNOSIS — Z23 NEEDS FLU SHOT: ICD-10-CM

## 2019-01-01 DIAGNOSIS — R42 DIZZINESS: ICD-10-CM

## 2019-01-01 DIAGNOSIS — J32.9 RECURRENT SINUSITIS: ICD-10-CM

## 2019-01-01 DIAGNOSIS — R91.8 LUNG MASS: ICD-10-CM

## 2019-01-01 DIAGNOSIS — N17.9 SEPSIS WITH ACUTE RENAL FAILURE WITHOUT SEPTIC SHOCK, DUE TO UNSPECIFIED ORGANISM, UNSPECIFIED ACUTE RENAL FAILURE TYPE (HCC): Primary | ICD-10-CM

## 2019-01-01 DIAGNOSIS — R65.20 SEPSIS WITH ACUTE RENAL FAILURE WITHOUT SEPTIC SHOCK, DUE TO UNSPECIFIED ORGANISM, UNSPECIFIED ACUTE RENAL FAILURE TYPE (HCC): Primary | ICD-10-CM

## 2019-01-01 DIAGNOSIS — H81.10 BENIGN PAROXYSMAL POSITIONAL VERTIGO, UNSPECIFIED LATERALITY: ICD-10-CM

## 2019-01-01 DIAGNOSIS — J01.40 ACUTE PANSINUSITIS, RECURRENCE NOT SPECIFIED: Primary | ICD-10-CM

## 2019-01-01 DIAGNOSIS — J01.00 ACUTE NON-RECURRENT MAXILLARY SINUSITIS: Primary | ICD-10-CM

## 2019-01-01 DIAGNOSIS — R19.7 VOMITING AND DIARRHEA: ICD-10-CM

## 2019-01-01 DIAGNOSIS — J44.1 COPD WITH EXACERBATION (HCC): ICD-10-CM

## 2019-01-01 DIAGNOSIS — Z09 HOSPITAL DISCHARGE FOLLOW-UP: ICD-10-CM

## 2019-01-01 DIAGNOSIS — A41.9 SEPTICEMIA (HCC): Primary | ICD-10-CM

## 2019-01-01 DIAGNOSIS — J06.9 URI WITH COUGH AND CONGESTION: ICD-10-CM

## 2019-01-01 DIAGNOSIS — R06.00 DYSPNEA, UNSPECIFIED TYPE: ICD-10-CM

## 2019-01-01 DIAGNOSIS — J32.4 CHRONIC PANSINUSITIS: Primary | ICD-10-CM

## 2019-01-01 DIAGNOSIS — R51.9 ACUTE NONINTRACTABLE HEADACHE, UNSPECIFIED HEADACHE TYPE: ICD-10-CM

## 2019-01-01 DIAGNOSIS — J44.1 COPD EXACERBATION (HCC): ICD-10-CM

## 2019-01-01 LAB
ABO/RH: NORMAL
ALBUMIN SERPL-MCNC: 2.6 G/DL (ref 3.5–4.6)
ALBUMIN SERPL-MCNC: 2.9 G/DL (ref 3.5–4.6)
ALBUMIN SERPL-MCNC: 2.9 G/DL (ref 3.5–4.6)
ALBUMIN SERPL-MCNC: 3 G/DL (ref 3.5–4.6)
ALBUMIN SERPL-MCNC: 3.1 G/DL (ref 3.5–4.6)
ALBUMIN SERPL-MCNC: 3.2 G/DL (ref 3.5–4.6)
ALBUMIN SERPL-MCNC: 3.3 G/DL (ref 3.5–4.6)
ALBUMIN SERPL-MCNC: 3.5 G/DL (ref 3.5–4.6)
ALBUMIN SERPL-MCNC: 3.8 G/DL (ref 3.5–4.6)
ALP BLD-CCNC: 58 U/L (ref 40–130)
ALP BLD-CCNC: 61 U/L (ref 40–130)
ALP BLD-CCNC: 68 U/L (ref 40–130)
ALP BLD-CCNC: 72 U/L (ref 40–130)
ALP BLD-CCNC: 79 U/L (ref 40–130)
ALP BLD-CCNC: 85 U/L (ref 40–130)
ALT SERPL-CCNC: 6 U/L (ref 0–33)
ALT SERPL-CCNC: <5 U/L (ref 0–33)
ANION GAP SERPL CALCULATED.3IONS-SCNC: 10 MEQ/L (ref 9–15)
ANION GAP SERPL CALCULATED.3IONS-SCNC: 11 MEQ/L (ref 9–15)
ANION GAP SERPL CALCULATED.3IONS-SCNC: 12 MEQ/L (ref 9–15)
ANION GAP SERPL CALCULATED.3IONS-SCNC: 12 MEQ/L (ref 9–15)
ANION GAP SERPL CALCULATED.3IONS-SCNC: 13 MEQ/L (ref 9–15)
ANION GAP SERPL CALCULATED.3IONS-SCNC: 14 MEQ/L (ref 9–15)
ANION GAP SERPL CALCULATED.3IONS-SCNC: 15 MEQ/L (ref 9–15)
ANION GAP SERPL CALCULATED.3IONS-SCNC: 15 MEQ/L (ref 9–15)
ANION GAP SERPL CALCULATED.3IONS-SCNC: 16 MEQ/L (ref 9–15)
ANION GAP SERPL CALCULATED.3IONS-SCNC: 17 MEQ/L (ref 9–15)
ANION GAP SERPL CALCULATED.3IONS-SCNC: 17 MEQ/L (ref 9–15)
ANION GAP SERPL CALCULATED.3IONS-SCNC: 18 MEQ/L (ref 9–15)
ANION GAP SERPL CALCULATED.3IONS-SCNC: 26 MEQ/L (ref 9–15)
ANION GAP SERPL CALCULATED.3IONS-SCNC: 8 MEQ/L (ref 9–15)
ANION GAP SERPL CALCULATED.3IONS-SCNC: 9 MEQ/L (ref 9–15)
ANISOCYTOSIS: ABNORMAL
ANTIBODY SCREEN: NORMAL
APTT: 34.5 SEC (ref 24.4–36.8)
AST SERPL-CCNC: 10 U/L (ref 0–35)
AST SERPL-CCNC: 11 U/L (ref 0–35)
AST SERPL-CCNC: 11 U/L (ref 0–35)
AST SERPL-CCNC: 8 U/L (ref 0–35)
AST SERPL-CCNC: 9 U/L (ref 0–35)
AST SERPL-CCNC: 9 U/L (ref 0–35)
ATYPICAL LYMPHOCYTE RELATIVE PERCENT: 1 %
BACTERIA: ABNORMAL /HPF
BACTERIA: NEGATIVE /HPF
BANDED NEUTROPHILS RELATIVE PERCENT: 2 % (ref 5–11)
BASE EXCESS ARTERIAL: -11 (ref -3–3)
BASE EXCESS ARTERIAL: -13 (ref -3–3)
BASE EXCESS ARTERIAL: -4 (ref -3–3)
BASE EXCESS ARTERIAL: -4 (ref -3–3)
BASOPHILS ABSOLUTE: 0 K/UL (ref 0–0.2)
BASOPHILS ABSOLUTE: 0.1 K/UL (ref 0–0.2)
BASOPHILS ABSOLUTE: 0.2 K/UL (ref 0–0.2)
BASOPHILS RELATIVE PERCENT: 0.2 %
BASOPHILS RELATIVE PERCENT: 0.2 %
BASOPHILS RELATIVE PERCENT: 0.3 %
BASOPHILS RELATIVE PERCENT: 0.4 %
BASOPHILS RELATIVE PERCENT: 0.6 %
BASOPHILS RELATIVE PERCENT: 0.8 %
BASOPHILS RELATIVE PERCENT: 0.9 %
BASOPHILS RELATIVE PERCENT: 1 %
BASOPHILS RELATIVE PERCENT: 1.1 %
BASOPHILS RELATIVE PERCENT: 1.1 %
BASOPHILS RELATIVE PERCENT: 1.2 %
BILIRUB SERPL-MCNC: 0.3 MG/DL (ref 0.2–0.7)
BILIRUB SERPL-MCNC: <0.2 MG/DL (ref 0.2–0.7)
BILIRUBIN URINE: NEGATIVE
BILIRUBIN URINE: NEGATIVE
BLOOD BANK DISPENSE STATUS: NORMAL
BLOOD BANK PRODUCT CODE: NORMAL
BLOOD CULTURE, ROUTINE: NORMAL
BLOOD CULTURE, ROUTINE: NORMAL
BLOOD, URINE: ABNORMAL
BLOOD, URINE: ABNORMAL
BPU ID: NORMAL
BUN BLDV-MCNC: 12 MG/DL (ref 8–23)
BUN BLDV-MCNC: 14 MG/DL (ref 8–23)
BUN BLDV-MCNC: 18 MG/DL (ref 8–23)
BUN BLDV-MCNC: 20 MG/DL (ref 8–23)
BUN BLDV-MCNC: 20 MG/DL (ref 8–23)
BUN BLDV-MCNC: 21 MG/DL (ref 8–23)
BUN BLDV-MCNC: 22 MG/DL (ref 8–23)
BUN BLDV-MCNC: 22 MG/DL (ref 8–23)
BUN BLDV-MCNC: 23 MG/DL (ref 8–23)
BUN BLDV-MCNC: 25 MG/DL (ref 8–23)
BUN BLDV-MCNC: 25 MG/DL (ref 8–23)
BUN BLDV-MCNC: 26 MG/DL (ref 8–23)
BUN BLDV-MCNC: 26 MG/DL (ref 8–23)
BUN BLDV-MCNC: 27 MG/DL (ref 8–23)
BUN BLDV-MCNC: 27 MG/DL (ref 8–23)
BUN BLDV-MCNC: 29 MG/DL (ref 8–23)
BUN BLDV-MCNC: 30 MG/DL (ref 8–23)
BUN BLDV-MCNC: 31 MG/DL (ref 8–23)
BUN BLDV-MCNC: 32 MG/DL (ref 8–23)
BUN BLDV-MCNC: 34 MG/DL (ref 8–23)
BUN BLDV-MCNC: 7 MG/DL (ref 8–23)
C-REACTIVE PROTEIN, HIGH SENSITIVITY: 68.9 MG/L (ref 0–5)
C-REACTIVE PROTEIN, HIGH SENSITIVITY: 71.3 MG/L (ref 0–5)
C-REACTIVE PROTEIN, HIGH SENSITIVITY: 8.9 MG/L (ref 0–5)
C-REACTIVE PROTEIN, HIGH SENSITIVITY: 91.8 MG/L (ref 0–5)
CALCIUM IONIZED: 1.07 MMOL/L (ref 1.12–1.32)
CALCIUM IONIZED: 1.08 MMOL/L (ref 1.12–1.32)
CALCIUM IONIZED: 1.17 MMOL/L (ref 1.12–1.32)
CALCIUM SERPL-MCNC: 7.4 MG/DL (ref 8.5–9.9)
CALCIUM SERPL-MCNC: 7.6 MG/DL (ref 8.5–9.9)
CALCIUM SERPL-MCNC: 7.8 MG/DL (ref 8.5–9.9)
CALCIUM SERPL-MCNC: 8 MG/DL (ref 8.5–9.9)
CALCIUM SERPL-MCNC: 8.1 MG/DL (ref 8.5–9.9)
CALCIUM SERPL-MCNC: 8.2 MG/DL (ref 8.5–9.9)
CALCIUM SERPL-MCNC: 8.3 MG/DL (ref 8.5–9.9)
CALCIUM SERPL-MCNC: 8.4 MG/DL (ref 8.5–9.9)
CALCIUM SERPL-MCNC: 8.6 MG/DL (ref 8.5–9.9)
CALCIUM SERPL-MCNC: 8.6 MG/DL (ref 8.5–9.9)
CALCIUM SERPL-MCNC: 8.7 MG/DL (ref 8.5–9.9)
CALCIUM SERPL-MCNC: 8.7 MG/DL (ref 8.5–9.9)
CALCIUM SERPL-MCNC: 8.9 MG/DL (ref 8.5–9.9)
CALCIUM SERPL-MCNC: 9 MG/DL (ref 8.5–9.9)
CALCIUM SERPL-MCNC: 9.1 MG/DL (ref 8.5–9.9)
CALCIUM SERPL-MCNC: 9.2 MG/DL (ref 8.5–9.9)
CALCIUM SERPL-MCNC: 9.5 MG/DL (ref 8.5–9.9)
CASTS: ABNORMAL /LPF
CASTS: ABNORMAL /LPF
CHLORIDE BLD-SCNC: 100 MEQ/L (ref 95–107)
CHLORIDE BLD-SCNC: 101 MEQ/L (ref 95–107)
CHLORIDE BLD-SCNC: 102 MEQ/L (ref 95–107)
CHLORIDE BLD-SCNC: 102 MEQ/L (ref 95–107)
CHLORIDE BLD-SCNC: 103 MEQ/L (ref 95–107)
CHLORIDE BLD-SCNC: 103 MEQ/L (ref 95–107)
CHLORIDE BLD-SCNC: 105 MEQ/L (ref 95–107)
CHLORIDE BLD-SCNC: 106 MEQ/L (ref 95–107)
CHLORIDE BLD-SCNC: 107 MEQ/L (ref 95–107)
CHLORIDE BLD-SCNC: 108 MEQ/L (ref 95–107)
CHLORIDE BLD-SCNC: 92 MEQ/L (ref 95–107)
CHLORIDE BLD-SCNC: 94 MEQ/L (ref 95–107)
CHLORIDE BLD-SCNC: 95 MEQ/L (ref 95–107)
CHLORIDE BLD-SCNC: 95 MEQ/L (ref 95–107)
CHLORIDE BLD-SCNC: 98 MEQ/L (ref 95–107)
CHLORIDE BLD-SCNC: 99 MEQ/L (ref 95–107)
CLARITY: ABNORMAL
CLARITY: ABNORMAL
CO2: 14 MEQ/L (ref 20–31)
CO2: 18 MEQ/L (ref 20–31)
CO2: 18 MEQ/L (ref 20–31)
CO2: 19 MEQ/L (ref 20–31)
CO2: 20 MEQ/L (ref 20–31)
CO2: 21 MEQ/L (ref 20–31)
CO2: 22 MEQ/L (ref 20–31)
CO2: 23 MEQ/L (ref 20–31)
CO2: 24 MEQ/L (ref 20–31)
CO2: 24 MEQ/L (ref 20–31)
CO2: 25 MEQ/L (ref 20–31)
CO2: 26 MEQ/L (ref 20–31)
COLOR: ABNORMAL
COLOR: ABNORMAL
CREAT SERPL-MCNC: 0.48 MG/DL (ref 0.5–0.9)
CREAT SERPL-MCNC: 0.49 MG/DL (ref 0.5–0.9)
CREAT SERPL-MCNC: 0.49 MG/DL (ref 0.5–0.9)
CREAT SERPL-MCNC: 0.54 MG/DL (ref 0.5–0.9)
CREAT SERPL-MCNC: 0.54 MG/DL (ref 0.5–0.9)
CREAT SERPL-MCNC: 0.59 MG/DL (ref 0.5–0.9)
CREAT SERPL-MCNC: 0.64 MG/DL (ref 0.5–0.9)
CREAT SERPL-MCNC: 0.66 MG/DL (ref 0.5–0.9)
CREAT SERPL-MCNC: 0.68 MG/DL (ref 0.5–0.9)
CREAT SERPL-MCNC: 0.69 MG/DL (ref 0.5–0.9)
CREAT SERPL-MCNC: 0.72 MG/DL (ref 0.5–0.9)
CREAT SERPL-MCNC: 0.74 MG/DL (ref 0.5–0.9)
CREAT SERPL-MCNC: 0.95 MG/DL (ref 0.5–0.9)
CREAT SERPL-MCNC: 0.96 MG/DL (ref 0.5–0.9)
CREAT SERPL-MCNC: 0.98 MG/DL (ref 0.5–0.9)
CREAT SERPL-MCNC: 1.01 MG/DL (ref 0.5–0.9)
CREAT SERPL-MCNC: 1.05 MG/DL (ref 0.5–0.9)
CREAT SERPL-MCNC: 1.06 MG/DL (ref 0.5–0.9)
CREAT SERPL-MCNC: 1.06 MG/DL (ref 0.5–0.9)
CREAT SERPL-MCNC: 1.07 MG/DL (ref 0.5–0.9)
CREAT SERPL-MCNC: 1.08 MG/DL (ref 0.5–0.9)
CREAT SERPL-MCNC: 1.13 MG/DL (ref 0.5–0.9)
CREAT SERPL-MCNC: 1.15 MG/DL (ref 0.5–0.9)
CREAT SERPL-MCNC: 1.21 MG/DL (ref 0.5–0.9)
CREAT SERPL-MCNC: 1.29 MG/DL (ref 0.5–0.9)
CREAT SERPL-MCNC: 1.39 MG/DL (ref 0.5–0.9)
CULTURE, BLOOD 2: NORMAL
CULTURE, BLOOD 2: NORMAL
CULTURE, RESPIRATORY: ABNORMAL
CULTURE, RESPIRATORY: NORMAL
DESCRIPTION BLOOD BANK: NORMAL
EKG ATRIAL RATE: 100 BPM
EKG ATRIAL RATE: 104 BPM
EKG ATRIAL RATE: 106 BPM
EKG ATRIAL RATE: 127 BPM
EKG ATRIAL RATE: 141 BPM
EKG ATRIAL RATE: 159 BPM
EKG ATRIAL RATE: 163 BPM
EKG ATRIAL RATE: 182 BPM
EKG ATRIAL RATE: 59 BPM
EKG ATRIAL RATE: 72 BPM
EKG ATRIAL RATE: 79 BPM
EKG ATRIAL RATE: 93 BPM
EKG ATRIAL RATE: 97 BPM
EKG P AXIS: 49 DEGREES
EKG P AXIS: 60 DEGREES
EKG P AXIS: 62 DEGREES
EKG P AXIS: 63 DEGREES
EKG P AXIS: 64 DEGREES
EKG P AXIS: 66 DEGREES
EKG P AXIS: 66 DEGREES
EKG P AXIS: 73 DEGREES
EKG P-R INTERVAL: 122 MS
EKG P-R INTERVAL: 124 MS
EKG P-R INTERVAL: 126 MS
EKG P-R INTERVAL: 128 MS
EKG P-R INTERVAL: 128 MS
EKG P-R INTERVAL: 130 MS
EKG P-R INTERVAL: 134 MS
EKG P-R INTERVAL: 136 MS
EKG Q-T INTERVAL: 246 MS
EKG Q-T INTERVAL: 270 MS
EKG Q-T INTERVAL: 286 MS
EKG Q-T INTERVAL: 290 MS
EKG Q-T INTERVAL: 326 MS
EKG Q-T INTERVAL: 340 MS
EKG Q-T INTERVAL: 354 MS
EKG Q-T INTERVAL: 366 MS
EKG Q-T INTERVAL: 366 MS
EKG Q-T INTERVAL: 388 MS
EKG Q-T INTERVAL: 400 MS
EKG Q-T INTERVAL: 422 MS
EKG Q-T INTERVAL: 482 MS
EKG QRS DURATION: 162 MS
EKG QRS DURATION: 72 MS
EKG QRS DURATION: 76 MS
EKG QRS DURATION: 76 MS
EKG QRS DURATION: 78 MS
EKG QRS DURATION: 80 MS
EKG QRS DURATION: 82 MS
EKG QRS DURATION: 86 MS
EKG QRS DURATION: 86 MS
EKG QTC CALCULATION (BAZETT): 422 MS
EKG QTC CALCULATION (BAZETT): 428 MS
EKG QTC CALCULATION (BAZETT): 436 MS
EKG QTC CALCULATION (BAZETT): 438 MS
EKG QTC CALCULATION (BAZETT): 444 MS
EKG QTC CALCULATION (BAZETT): 446 MS
EKG QTC CALCULATION (BAZETT): 462 MS
EKG QTC CALCULATION (BAZETT): 464 MS
EKG QTC CALCULATION (BAZETT): 465 MS
EKG QTC CALCULATION (BAZETT): 477 MS
EKG QTC CALCULATION (BAZETT): 486 MS
EKG QTC CALCULATION (BAZETT): 501 MS
EKG QTC CALCULATION (BAZETT): 516 MS
EKG R AXIS: -11 DEGREES
EKG R AXIS: -27 DEGREES
EKG R AXIS: 1 DEGREES
EKG R AXIS: 1 DEGREES
EKG R AXIS: 12 DEGREES
EKG R AXIS: 14 DEGREES
EKG R AXIS: 15 DEGREES
EKG R AXIS: 32 DEGREES
EKG R AXIS: 35 DEGREES
EKG R AXIS: 35 DEGREES
EKG R AXIS: 40 DEGREES
EKG R AXIS: 45 DEGREES
EKG R AXIS: 64 DEGREES
EKG T AXIS: 34 DEGREES
EKG T AXIS: 46 DEGREES
EKG T AXIS: 56 DEGREES
EKG T AXIS: 56 DEGREES
EKG T AXIS: 58 DEGREES
EKG T AXIS: 59 DEGREES
EKG T AXIS: 60 DEGREES
EKG T AXIS: 62 DEGREES
EKG T AXIS: 66 DEGREES
EKG T AXIS: 67 DEGREES
EKG T AXIS: 70 DEGREES
EKG T AXIS: 70 DEGREES
EKG T AXIS: 73 DEGREES
EKG VENTRICULAR RATE: 100 BPM
EKG VENTRICULAR RATE: 104 BPM
EKG VENTRICULAR RATE: 106 BPM
EKG VENTRICULAR RATE: 141 BPM
EKG VENTRICULAR RATE: 142 BPM
EKG VENTRICULAR RATE: 142 BPM
EKG VENTRICULAR RATE: 157 BPM
EKG VENTRICULAR RATE: 182 BPM
EKG VENTRICULAR RATE: 59 BPM
EKG VENTRICULAR RATE: 72 BPM
EKG VENTRICULAR RATE: 79 BPM
EKG VENTRICULAR RATE: 93 BPM
EKG VENTRICULAR RATE: 97 BPM
EOSINOPHILS ABSOLUTE: 0 K/UL (ref 0–0.7)
EOSINOPHILS ABSOLUTE: 0.1 K/UL (ref 0–0.7)
EOSINOPHILS ABSOLUTE: 0.2 K/UL (ref 0–0.7)
EOSINOPHILS ABSOLUTE: 0.4 K/UL (ref 0–0.7)
EOSINOPHILS ABSOLUTE: 0.5 K/UL (ref 0–0.7)
EOSINOPHILS ABSOLUTE: 0.9 K/UL (ref 0–0.7)
EOSINOPHILS RELATIVE PERCENT: 0 %
EOSINOPHILS RELATIVE PERCENT: 0 %
EOSINOPHILS RELATIVE PERCENT: 0.1 %
EOSINOPHILS RELATIVE PERCENT: 0.1 %
EOSINOPHILS RELATIVE PERCENT: 0.2 %
EOSINOPHILS RELATIVE PERCENT: 0.3 %
EOSINOPHILS RELATIVE PERCENT: 0.6 %
EOSINOPHILS RELATIVE PERCENT: 0.7 %
EOSINOPHILS RELATIVE PERCENT: 1 %
EOSINOPHILS RELATIVE PERCENT: 1.1 %
EOSINOPHILS RELATIVE PERCENT: 1.5 %
EOSINOPHILS RELATIVE PERCENT: 3 %
EOSINOPHILS RELATIVE PERCENT: 3.2 %
EOSINOPHILS RELATIVE PERCENT: 6 %
EPITHELIAL CELLS, UA: ABNORMAL /HPF (ref 0–5)
EPITHELIAL CELLS, UA: ABNORMAL /HPF (ref 0–5)
GFR AFRICAN AMERICAN: 38
GFR AFRICAN AMERICAN: 45.2
GFR AFRICAN AMERICAN: 49.3
GFR AFRICAN AMERICAN: 53.1
GFR AFRICAN AMERICAN: 54
GFR AFRICAN AMERICAN: 56.3
GFR AFRICAN AMERICAN: 57.5
GFR AFRICAN AMERICAN: 59
GFR AFRICAN AMERICAN: 59
GFR AFRICAN AMERICAN: >60
GFR NON-AFRICAN AMERICAN: 32
GFR NON-AFRICAN AMERICAN: 37.4
GFR NON-AFRICAN AMERICAN: 40.8
GFR NON-AFRICAN AMERICAN: 43.9
GFR NON-AFRICAN AMERICAN: 44
GFR NON-AFRICAN AMERICAN: 46.5
GFR NON-AFRICAN AMERICAN: 47.5
GFR NON-AFRICAN AMERICAN: 49
GFR NON-AFRICAN AMERICAN: 49
GFR NON-AFRICAN AMERICAN: 50
GFR NON-AFRICAN AMERICAN: 50.6
GFR NON-AFRICAN AMERICAN: 51.1
GFR NON-AFRICAN AMERICAN: 51.1
GFR NON-AFRICAN AMERICAN: 51.7
GFR NON-AFRICAN AMERICAN: 54.1
GFR NON-AFRICAN AMERICAN: 56
GFR NON-AFRICAN AMERICAN: 57.3
GFR NON-AFRICAN AMERICAN: 58
GFR NON-AFRICAN AMERICAN: >60
GLOBULIN: 2.6 G/DL (ref 2.3–3.5)
GLOBULIN: 2.7 G/DL (ref 2.3–3.5)
GLOBULIN: 3.1 G/DL (ref 2.3–3.5)
GLOBULIN: 3.2 G/DL (ref 2.3–3.5)
GLOBULIN: 3.3 G/DL (ref 2.3–3.5)
GLOBULIN: 3.6 G/DL (ref 2.3–3.5)
GLUCOSE BLD-MCNC: 101 MG/DL (ref 60–115)
GLUCOSE BLD-MCNC: 103 MG/DL (ref 70–99)
GLUCOSE BLD-MCNC: 104 MG/DL (ref 70–99)
GLUCOSE BLD-MCNC: 113 MG/DL (ref 70–99)
GLUCOSE BLD-MCNC: 113 MG/DL (ref 70–99)
GLUCOSE BLD-MCNC: 117 MG/DL (ref 70–99)
GLUCOSE BLD-MCNC: 120 MG/DL (ref 70–99)
GLUCOSE BLD-MCNC: 133 MG/DL (ref 70–99)
GLUCOSE BLD-MCNC: 141 MG/DL (ref 70–99)
GLUCOSE BLD-MCNC: 141 MG/DL (ref 70–99)
GLUCOSE BLD-MCNC: 164 MG/DL (ref 70–99)
GLUCOSE BLD-MCNC: 174 MG/DL (ref 70–99)
GLUCOSE BLD-MCNC: 184 MG/DL (ref 60–115)
GLUCOSE BLD-MCNC: 232 MG/DL (ref 70–99)
GLUCOSE BLD-MCNC: 263 MG/DL (ref 60–115)
GLUCOSE BLD-MCNC: 291 MG/DL (ref 60–115)
GLUCOSE BLD-MCNC: 66 MG/DL (ref 70–99)
GLUCOSE BLD-MCNC: 76 MG/DL (ref 70–99)
GLUCOSE BLD-MCNC: 84 MG/DL (ref 70–99)
GLUCOSE BLD-MCNC: 86 MG/DL (ref 70–99)
GLUCOSE BLD-MCNC: 89 MG/DL (ref 70–99)
GLUCOSE BLD-MCNC: 90 MG/DL (ref 70–99)
GLUCOSE BLD-MCNC: 95 MG/DL (ref 70–99)
GLUCOSE BLD-MCNC: 97 MG/DL (ref 70–99)
GLUCOSE BLD-MCNC: 97 MG/DL (ref 70–99)
GLUCOSE BLD-MCNC: 98 MG/DL (ref 70–99)
GLUCOSE BLD-MCNC: 98 MG/DL (ref 70–99)
GLUCOSE URINE: NEGATIVE MG/DL
GLUCOSE URINE: NEGATIVE MG/DL
GRAM STAIN RESULT: ABNORMAL
GRAM STAIN RESULT: NORMAL
HCO3 ARTERIAL: 19.1 MMOL/L (ref 21–29)
HCO3 ARTERIAL: 19.3 MMOL/L (ref 21–29)
HCO3 ARTERIAL: 20.3 MMOL/L (ref 21–29)
HCO3 ARTERIAL: 20.9 MMOL/L (ref 21–29)
HCT VFR BLD CALC: 21.1 % (ref 37–47)
HCT VFR BLD CALC: 22.8 % (ref 37–47)
HCT VFR BLD CALC: 22.9 % (ref 37–47)
HCT VFR BLD CALC: 24 % (ref 37–47)
HCT VFR BLD CALC: 25.2 % (ref 37–47)
HCT VFR BLD CALC: 25.5 % (ref 37–47)
HCT VFR BLD CALC: 25.5 % (ref 37–47)
HCT VFR BLD CALC: 25.7 % (ref 37–47)
HCT VFR BLD CALC: 26.3 % (ref 37–47)
HCT VFR BLD CALC: 26.7 % (ref 37–47)
HCT VFR BLD CALC: 26.7 % (ref 37–47)
HCT VFR BLD CALC: 26.8 % (ref 37–47)
HCT VFR BLD CALC: 26.9 % (ref 37–47)
HCT VFR BLD CALC: 27 % (ref 37–47)
HCT VFR BLD CALC: 27.5 % (ref 37–47)
HCT VFR BLD CALC: 28.5 % (ref 37–47)
HCT VFR BLD CALC: 28.7 % (ref 37–47)
HCT VFR BLD CALC: 29 % (ref 37–47)
HCT VFR BLD CALC: 29.8 % (ref 37–47)
HCT VFR BLD CALC: 30.4 % (ref 37–47)
HCT VFR BLD CALC: 32.6 % (ref 37–47)
HCT VFR BLD CALC: 32.8 % (ref 37–47)
HEMOGLOBIN: 10 GM/DL (ref 12–16)
HEMOGLOBIN: 10.2 G/DL (ref 12–16)
HEMOGLOBIN: 10.6 G/DL (ref 12–16)
HEMOGLOBIN: 6.6 G/DL (ref 12–16)
HEMOGLOBIN: 7 G/DL (ref 12–16)
HEMOGLOBIN: 7.2 G/DL (ref 12–16)
HEMOGLOBIN: 7.3 G/DL (ref 12–16)
HEMOGLOBIN: 7.6 GM/DL (ref 12–16)
HEMOGLOBIN: 7.7 G/DL (ref 12–16)
HEMOGLOBIN: 7.9 GM/DL (ref 12–16)
HEMOGLOBIN: 8 G/DL (ref 12–16)
HEMOGLOBIN: 8 G/DL (ref 12–16)
HEMOGLOBIN: 8.2 G/DL (ref 12–16)
HEMOGLOBIN: 8.2 G/DL (ref 12–16)
HEMOGLOBIN: 8.3 G/DL (ref 12–16)
HEMOGLOBIN: 8.5 G/DL (ref 12–16)
HEMOGLOBIN: 8.6 G/DL (ref 12–16)
HEMOGLOBIN: 8.8 G/DL (ref 12–16)
HEMOGLOBIN: 9 G/DL (ref 12–16)
HEMOGLOBIN: 9 G/DL (ref 12–16)
HEMOGLOBIN: 9.1 G/DL (ref 12–16)
HEMOGLOBIN: 9.5 G/DL (ref 12–16)
HEMOGLOBIN: 9.7 G/DL (ref 12–16)
HYPOCHROMIA: ABNORMAL
HYPOCHROMIA: ABNORMAL
INR BLD: 1
INR BLD: 1
INR BLD: 1.1
IRON SATURATION: 11 % (ref 11–46)
IRON: 21 UG/DL (ref 37–145)
KETONES, URINE: 15 MG/DL
KETONES, URINE: 15 MG/DL
L. PNEUMOPHILA SEROGP 1 UR AG: NEGATIVE
LACTATE: 1.36 MMOL/L (ref 0.4–2)
LACTATE: 2.27 MMOL/L (ref 0.4–2)
LACTATE: 3.14 MMOL/L (ref 0.4–2)
LACTATE: 3.92 MMOL/L (ref 0.4–2)
LACTIC ACID, SEPSIS: 1.5 MMOL/L (ref 0.5–1.9)
LACTIC ACID, SEPSIS: 1.6 MMOL/L (ref 0.5–1.9)
LACTIC ACID, SEPSIS: 11.1 MMOL/L (ref 0.5–1.9)
LACTIC ACID: 1.5 MMOL/L (ref 0.5–2.2)
LACTIC ACID: 1.7 MMOL/L (ref 0.5–2.2)
LEUKOCYTE ESTERASE, URINE: ABNORMAL
LEUKOCYTE ESTERASE, URINE: ABNORMAL
LIPASE: 16 U/L (ref 12–95)
LIPASE: 21 U/L (ref 12–95)
LV EF: 63 %
LV EF: 78 %
LV EF: 78 %
LVEF MODALITY: NORMAL
LYMPHOCYTES ABSOLUTE: 0.4 K/UL (ref 1–4.8)
LYMPHOCYTES ABSOLUTE: 0.5 K/UL (ref 1–4.8)
LYMPHOCYTES ABSOLUTE: 0.7 K/UL (ref 1–4.8)
LYMPHOCYTES ABSOLUTE: 1 K/UL (ref 1–4.8)
LYMPHOCYTES ABSOLUTE: 1 K/UL (ref 1–4.8)
LYMPHOCYTES ABSOLUTE: 1.1 K/UL (ref 1–4.8)
LYMPHOCYTES ABSOLUTE: 1.1 K/UL (ref 1–4.8)
LYMPHOCYTES ABSOLUTE: 1.4 K/UL (ref 1–4.8)
LYMPHOCYTES ABSOLUTE: 1.6 K/UL (ref 1–4.8)
LYMPHOCYTES ABSOLUTE: 2.2 K/UL (ref 1–4.8)
LYMPHOCYTES ABSOLUTE: 2.4 K/UL (ref 1–4.8)
LYMPHOCYTES ABSOLUTE: 2.5 K/UL (ref 1–4.8)
LYMPHOCYTES RELATIVE PERCENT: 10.2 %
LYMPHOCYTES RELATIVE PERCENT: 11 %
LYMPHOCYTES RELATIVE PERCENT: 12 %
LYMPHOCYTES RELATIVE PERCENT: 12 %
LYMPHOCYTES RELATIVE PERCENT: 13.2 %
LYMPHOCYTES RELATIVE PERCENT: 14 %
LYMPHOCYTES RELATIVE PERCENT: 15.9 %
LYMPHOCYTES RELATIVE PERCENT: 16.4 %
LYMPHOCYTES RELATIVE PERCENT: 18 %
LYMPHOCYTES RELATIVE PERCENT: 2 %
LYMPHOCYTES RELATIVE PERCENT: 5.4 %
LYMPHOCYTES RELATIVE PERCENT: 6.6 %
LYMPHOCYTES RELATIVE PERCENT: 7 %
LYMPHOCYTES RELATIVE PERCENT: 7.6 %
LYMPHOCYTES RELATIVE PERCENT: 8.4 %
LYMPHOCYTES RELATIVE PERCENT: 9.7 %
MACROCYTES: ABNORMAL
MAGNESIUM: 1.5 MG/DL (ref 1.7–2.4)
MAGNESIUM: 1.6 MG/DL (ref 1.7–2.4)
MAGNESIUM: 1.8 MG/DL (ref 1.7–2.4)
MAGNESIUM: 1.9 MG/DL (ref 1.7–2.4)
MAGNESIUM: 2.1 MG/DL (ref 1.7–2.4)
MCH RBC QN AUTO: 24.2 PG (ref 27–31.3)
MCH RBC QN AUTO: 24.2 PG (ref 27–31.3)
MCH RBC QN AUTO: 24.6 PG (ref 27–31.3)
MCH RBC QN AUTO: 24.7 PG (ref 27–31.3)
MCH RBC QN AUTO: 24.7 PG (ref 27–31.3)
MCH RBC QN AUTO: 24.8 PG (ref 27–31.3)
MCH RBC QN AUTO: 24.9 PG (ref 27–31.3)
MCH RBC QN AUTO: 25 PG (ref 27–31.3)
MCH RBC QN AUTO: 25 PG (ref 27–31.3)
MCH RBC QN AUTO: 25.6 PG (ref 27–31.3)
MCH RBC QN AUTO: 25.7 PG (ref 27–31.3)
MCH RBC QN AUTO: 25.8 PG (ref 27–31.3)
MCH RBC QN AUTO: 25.9 PG (ref 27–31.3)
MCH RBC QN AUTO: 26 PG (ref 27–31.3)
MCH RBC QN AUTO: 26 PG (ref 27–31.3)
MCH RBC QN AUTO: 26.1 PG (ref 27–31.3)
MCH RBC QN AUTO: 26.2 PG (ref 27–31.3)
MCH RBC QN AUTO: 26.4 PG (ref 27–31.3)
MCHC RBC AUTO-ENTMCNC: 30.3 % (ref 33–37)
MCHC RBC AUTO-ENTMCNC: 30.4 % (ref 33–37)
MCHC RBC AUTO-ENTMCNC: 30.6 % (ref 33–37)
MCHC RBC AUTO-ENTMCNC: 30.6 % (ref 33–37)
MCHC RBC AUTO-ENTMCNC: 30.7 % (ref 33–37)
MCHC RBC AUTO-ENTMCNC: 30.8 % (ref 33–37)
MCHC RBC AUTO-ENTMCNC: 31.2 % (ref 33–37)
MCHC RBC AUTO-ENTMCNC: 31.2 % (ref 33–37)
MCHC RBC AUTO-ENTMCNC: 31.3 % (ref 33–37)
MCHC RBC AUTO-ENTMCNC: 31.4 % (ref 33–37)
MCHC RBC AUTO-ENTMCNC: 31.5 % (ref 33–37)
MCHC RBC AUTO-ENTMCNC: 31.5 % (ref 33–37)
MCHC RBC AUTO-ENTMCNC: 31.7 % (ref 33–37)
MCHC RBC AUTO-ENTMCNC: 31.8 % (ref 33–37)
MCHC RBC AUTO-ENTMCNC: 31.8 % (ref 33–37)
MCHC RBC AUTO-ENTMCNC: 31.9 % (ref 33–37)
MCHC RBC AUTO-ENTMCNC: 32 % (ref 33–37)
MCHC RBC AUTO-ENTMCNC: 32.4 % (ref 33–37)
MCHC RBC AUTO-ENTMCNC: 32.6 % (ref 33–37)
MCHC RBC AUTO-ENTMCNC: 32.6 % (ref 33–37)
MCV RBC AUTO: 78.6 FL (ref 82–100)
MCV RBC AUTO: 78.7 FL (ref 82–100)
MCV RBC AUTO: 78.9 FL (ref 82–100)
MCV RBC AUTO: 78.9 FL (ref 82–100)
MCV RBC AUTO: 79.1 FL (ref 82–100)
MCV RBC AUTO: 79.6 FL (ref 82–100)
MCV RBC AUTO: 79.9 FL (ref 82–100)
MCV RBC AUTO: 79.9 FL (ref 82–100)
MCV RBC AUTO: 80.2 FL (ref 82–100)
MCV RBC AUTO: 80.3 FL (ref 82–100)
MCV RBC AUTO: 80.5 FL (ref 82–100)
MCV RBC AUTO: 80.8 FL (ref 82–100)
MCV RBC AUTO: 80.9 FL (ref 82–100)
MCV RBC AUTO: 81.2 FL (ref 82–100)
MCV RBC AUTO: 81.3 FL (ref 82–100)
MCV RBC AUTO: 81.4 FL (ref 82–100)
MCV RBC AUTO: 81.4 FL (ref 82–100)
MCV RBC AUTO: 81.6 FL (ref 82–100)
MCV RBC AUTO: 82.2 FL (ref 82–100)
MCV RBC AUTO: 82.4 FL (ref 82–100)
METAMYELOCYTES RELATIVE PERCENT: 1 %
METAMYELOCYTES RELATIVE PERCENT: 1 %
MICROCYTES: ABNORMAL
MICROCYTES: ABNORMAL
MONOCYTES ABSOLUTE: 0 K/UL (ref 0.2–0.8)
MONOCYTES ABSOLUTE: 0.2 K/UL (ref 0.2–0.8)
MONOCYTES ABSOLUTE: 0.2 K/UL (ref 0.2–0.8)
MONOCYTES ABSOLUTE: 0.5 K/UL (ref 0.2–0.8)
MONOCYTES ABSOLUTE: 0.5 K/UL (ref 0.2–0.8)
MONOCYTES ABSOLUTE: 0.6 K/UL (ref 0.2–0.8)
MONOCYTES ABSOLUTE: 0.6 K/UL (ref 0.2–0.8)
MONOCYTES ABSOLUTE: 0.7 K/UL (ref 0.2–0.8)
MONOCYTES ABSOLUTE: 0.8 K/UL (ref 0.2–0.8)
MONOCYTES ABSOLUTE: 0.9 K/UL (ref 0.2–0.8)
MONOCYTES ABSOLUTE: 1 K/UL (ref 0.2–0.8)
MONOCYTES ABSOLUTE: 1.1 K/UL (ref 0.2–0.8)
MONOCYTES ABSOLUTE: 1.2 K/UL (ref 0.2–0.8)
MONOCYTES ABSOLUTE: 1.3 K/UL (ref 0.2–0.8)
MONOCYTES RELATIVE PERCENT: 0.5 %
MONOCYTES RELATIVE PERCENT: 1.8 %
MONOCYTES RELATIVE PERCENT: 2.8 %
MONOCYTES RELATIVE PERCENT: 2.8 %
MONOCYTES RELATIVE PERCENT: 4.1 %
MONOCYTES RELATIVE PERCENT: 4.4 %
MONOCYTES RELATIVE PERCENT: 5.4 %
MONOCYTES RELATIVE PERCENT: 5.7 %
MONOCYTES RELATIVE PERCENT: 6.2 %
MONOCYTES RELATIVE PERCENT: 6.2 %
MONOCYTES RELATIVE PERCENT: 7.1 %
MONOCYTES RELATIVE PERCENT: 7.2 %
MONOCYTES RELATIVE PERCENT: 7.7 %
MONOCYTES RELATIVE PERCENT: 8 %
MONOCYTES RELATIVE PERCENT: 8.3 %
MONOCYTES RELATIVE PERCENT: 9.3 %
MYELOCYTE PERCENT: 1 %
MYELOCYTE PERCENT: 4 %
MYELOCYTE PERCENT: 4 %
NEUTROPHILS ABSOLUTE: 10 K/UL (ref 1.4–6.5)
NEUTROPHILS ABSOLUTE: 10.3 K/UL (ref 1.4–6.5)
NEUTROPHILS ABSOLUTE: 10.9 K/UL (ref 1.4–6.5)
NEUTROPHILS ABSOLUTE: 11.4 K/UL (ref 1.4–6.5)
NEUTROPHILS ABSOLUTE: 11.5 K/UL (ref 1.4–6.5)
NEUTROPHILS ABSOLUTE: 12.3 K/UL (ref 1.4–6.5)
NEUTROPHILS ABSOLUTE: 12.9 K/UL (ref 1.4–6.5)
NEUTROPHILS ABSOLUTE: 13.3 K/UL (ref 1.4–6.5)
NEUTROPHILS ABSOLUTE: 15.8 K/UL (ref 1.4–6.5)
NEUTROPHILS ABSOLUTE: 16.2 K/UL (ref 1.4–6.5)
NEUTROPHILS ABSOLUTE: 7.7 K/UL (ref 1.4–6.5)
NEUTROPHILS ABSOLUTE: 8.4 K/UL (ref 1.4–6.5)
NEUTROPHILS ABSOLUTE: 9 K/UL (ref 1.4–6.5)
NEUTROPHILS ABSOLUTE: 9.4 K/UL (ref 1.4–6.5)
NEUTROPHILS ABSOLUTE: 9.6 K/UL (ref 1.4–6.5)
NEUTROPHILS ABSOLUTE: 9.7 K/UL (ref 1.4–6.5)
NEUTROPHILS RELATIVE PERCENT: 72 %
NEUTROPHILS RELATIVE PERCENT: 74.2 %
NEUTROPHILS RELATIVE PERCENT: 77.2 %
NEUTROPHILS RELATIVE PERCENT: 77.5 %
NEUTROPHILS RELATIVE PERCENT: 77.9 %
NEUTROPHILS RELATIVE PERCENT: 78 %
NEUTROPHILS RELATIVE PERCENT: 79.4 %
NEUTROPHILS RELATIVE PERCENT: 80 %
NEUTROPHILS RELATIVE PERCENT: 80.3 %
NEUTROPHILS RELATIVE PERCENT: 82 %
NEUTROPHILS RELATIVE PERCENT: 82.1 %
NEUTROPHILS RELATIVE PERCENT: 85.1 %
NEUTROPHILS RELATIVE PERCENT: 86.7 %
NEUTROPHILS RELATIVE PERCENT: 87.7 %
NEUTROPHILS RELATIVE PERCENT: 91 %
NEUTROPHILS RELATIVE PERCENT: 93.9 %
NITRITE, URINE: NEGATIVE
NITRITE, URINE: NEGATIVE
O2 SAT, ARTERIAL: 89 % (ref 93–100)
O2 SAT, ARTERIAL: 90 % (ref 93–100)
O2 SAT, ARTERIAL: 94 % (ref 93–100)
O2 SAT, ARTERIAL: 99 % (ref 93–100)
ORGANISM: ABNORMAL
OVALOCYTES: ABNORMAL
PCO2 ARTERIAL: 29 MM HG (ref 35–45)
PCO2 ARTERIAL: 32 MM HG (ref 35–45)
PCO2 ARTERIAL: 65 MM HG (ref 35–45)
PCO2 ARTERIAL: 81 MM HG (ref 35–45)
PDW BLD-RTO: 14.5 % (ref 11.5–14.5)
PDW BLD-RTO: 14.6 % (ref 11.5–14.5)
PDW BLD-RTO: 14.7 % (ref 11.5–14.5)
PDW BLD-RTO: 14.7 % (ref 11.5–14.5)
PDW BLD-RTO: 14.8 % (ref 11.5–14.5)
PDW BLD-RTO: 14.9 % (ref 11.5–14.5)
PDW BLD-RTO: 15.2 % (ref 11.5–14.5)
PDW BLD-RTO: 15.6 % (ref 11.5–14.5)
PDW BLD-RTO: 15.9 % (ref 11.5–14.5)
PDW BLD-RTO: 16.2 % (ref 11.5–14.5)
PDW BLD-RTO: 16.3 % (ref 11.5–14.5)
PDW BLD-RTO: 16.3 % (ref 11.5–14.5)
PDW BLD-RTO: 16.8 % (ref 11.5–14.5)
PDW BLD-RTO: 17.3 % (ref 11.5–14.5)
PDW BLD-RTO: 17.4 % (ref 11.5–14.5)
PDW BLD-RTO: 17.5 % (ref 11.5–14.5)
PDW BLD-RTO: 17.7 % (ref 11.5–14.5)
PERFORMED ON: ABNORMAL
PERFORMED ON: NORMAL
PH ARTERIAL: 6.98 (ref 7.35–7.45)
PH ARTERIAL: 7.08 (ref 7.35–7.45)
PH ARTERIAL: 7.42 (ref 7.35–7.45)
PH ARTERIAL: 7.45 (ref 7.35–7.45)
PH UA: 5 (ref 5–9)
PH UA: 5.5 (ref 5–9)
PHOSPHORUS: 2.5 MG/DL (ref 2.3–4.8)
PHOSPHORUS: 2.6 MG/DL (ref 2.3–4.8)
PHOSPHORUS: 2.7 MG/DL (ref 2.3–4.8)
PHOSPHORUS: 3 MG/DL (ref 2.3–4.8)
PHOSPHORUS: 4.2 MG/DL (ref 2.3–4.8)
PHOSPHORUS: 5.4 MG/DL (ref 2.3–4.8)
PLATELET # BLD: 247 K/UL (ref 130–400)
PLATELET # BLD: 282 K/UL (ref 130–400)
PLATELET # BLD: 287 K/UL (ref 130–400)
PLATELET # BLD: 292 K/UL (ref 130–400)
PLATELET # BLD: 330 K/UL (ref 130–400)
PLATELET # BLD: 351 K/UL (ref 130–400)
PLATELET # BLD: 361 K/UL (ref 130–400)
PLATELET # BLD: 366 K/UL (ref 130–400)
PLATELET # BLD: 366 K/UL (ref 130–400)
PLATELET # BLD: 382 K/UL (ref 130–400)
PLATELET # BLD: 382 K/UL (ref 130–400)
PLATELET # BLD: 384 K/UL (ref 130–400)
PLATELET # BLD: 392 K/UL (ref 130–400)
PLATELET # BLD: 396 K/UL (ref 130–400)
PLATELET # BLD: 407 K/UL (ref 130–400)
PLATELET # BLD: 591 K/UL (ref 130–400)
PLATELET # BLD: 598 K/UL (ref 130–400)
PLATELET # BLD: 654 K/UL (ref 130–400)
PLATELET # BLD: 680 K/UL (ref 130–400)
PLATELET # BLD: 691 K/UL (ref 130–400)
PLATELET # BLD: 764 K/UL (ref 130–400)
PLATELET # BLD: 897 K/UL (ref 130–400)
PLATELET SLIDE REVIEW: ABNORMAL
PLATELET SLIDE REVIEW: ABNORMAL
PLATELET SLIDE REVIEW: ADEQUATE
PLATELET SLIDE REVIEW: ADEQUATE
PLATELET SLIDE REVIEW: NORMAL
PO2 ARTERIAL: 120 MM HG (ref 75–108)
PO2 ARTERIAL: 68 MM HG (ref 75–108)
PO2 ARTERIAL: 83 MM HG (ref 75–108)
PO2 ARTERIAL: 88 MM HG (ref 75–108)
POC CHLORIDE: 103 MEQ/L (ref 99–110)
POC CHLORIDE: 105 MEQ/L (ref 99–110)
POC CHLORIDE: 109 MEQ/L (ref 99–110)
POC CREATININE: 0.6 MG/DL (ref 0.6–1.2)
POC CREATININE: 1.1 MG/DL (ref 0.6–1.2)
POC CREATININE: 1.1 MG/DL (ref 0.6–1.2)
POC CREATININE: 1.2 MG/DL (ref 0.6–1.2)
POC CREATININE: 1.6 MG/DL (ref 0.6–1.2)
POC FIO2: 100
POC FIO2: 2
POC FIO2: 32
POC HEMATOCRIT: 22 % (ref 36–48)
POC HEMATOCRIT: 23 % (ref 36–48)
POC HEMATOCRIT: 29 % (ref 36–48)
POC POTASSIUM: 3.8 MEQ/L (ref 3.5–5.1)
POC POTASSIUM: 3.9 MEQ/L (ref 3.5–5.1)
POC POTASSIUM: 4.2 MEQ/L (ref 3.5–5.1)
POC SAMPLE TYPE: ABNORMAL
POC SAMPLE TYPE: NORMAL
POC SODIUM: 136 MEQ/L (ref 136–145)
POC SODIUM: 137 MEQ/L (ref 136–145)
POC SODIUM: 139 MEQ/L (ref 136–145)
POIKILOCYTES: ABNORMAL
POIKILOCYTES: ABNORMAL
POLYCHROMASIA: ABNORMAL
POTASSIUM REFLEX MAGNESIUM: 3.4 MEQ/L (ref 3.4–4.9)
POTASSIUM REFLEX MAGNESIUM: 3.6 MEQ/L (ref 3.4–4.9)
POTASSIUM REFLEX MAGNESIUM: 3.9 MEQ/L (ref 3.4–4.9)
POTASSIUM REFLEX MAGNESIUM: 4 MEQ/L (ref 3.4–4.9)
POTASSIUM REFLEX MAGNESIUM: 4.1 MEQ/L (ref 3.4–4.9)
POTASSIUM REFLEX MAGNESIUM: 4.2 MEQ/L (ref 3.4–4.9)
POTASSIUM REFLEX MAGNESIUM: 4.4 MEQ/L (ref 3.4–4.9)
POTASSIUM REFLEX MAGNESIUM: 4.5 MEQ/L (ref 3.4–4.9)
POTASSIUM REFLEX MAGNESIUM: 4.8 MEQ/L (ref 3.4–4.9)
POTASSIUM REFLEX MAGNESIUM: 4.9 MEQ/L (ref 3.4–4.9)
POTASSIUM SERPL-SCNC: 3.4 MEQ/L (ref 3.4–4.9)
POTASSIUM SERPL-SCNC: 4.2 MEQ/L (ref 3.4–4.9)
POTASSIUM SERPL-SCNC: 4.5 MEQ/L (ref 3.4–4.9)
POTASSIUM SERPL-SCNC: 4.7 MEQ/L (ref 3.4–4.9)
POTASSIUM SERPL-SCNC: 4.8 MEQ/L (ref 3.4–4.9)
POTASSIUM SERPL-SCNC: 4.9 MEQ/L (ref 3.4–4.9)
POTASSIUM SERPL-SCNC: 5 MEQ/L (ref 3.4–4.9)
POTASSIUM SERPL-SCNC: 5.2 MEQ/L (ref 3.4–4.9)
POTASSIUM SERPL-SCNC: 5.3 MEQ/L (ref 3.4–4.9)
POTASSIUM SERPL-SCNC: 6.1 MEQ/L (ref 3.4–4.9)
PRO-BNP: 1422 PG/ML
PRO-BNP: NORMAL PG/ML
PROCALCITONIN: 0.13 NG/ML (ref 0–0.15)
PROCALCITONIN: 0.25 NG/ML (ref 0–0.15)
PROCALCITONIN: 0.5 NG/ML (ref 0–0.15)
PROCALCITONIN: 1.11 NG/ML (ref 0–0.15)
PROCALCITONIN: 1.11 NG/ML (ref 0–0.15)
PROCALCITONIN: 1.16 NG/ML (ref 0–0.15)
PROCALCITONIN: 2.62 NG/ML (ref 0–0.15)
PROCALCITONIN: 3.52 NG/ML (ref 0–0.15)
PROTEIN UA: 100 MG/DL
PROTEIN UA: 30 MG/DL
PROTHROMBIN TIME: 13.3 SEC (ref 12.3–14.9)
PROTHROMBIN TIME: 13.8 SEC (ref 12.3–14.9)
PROTHROMBIN TIME: 14.6 SEC (ref 12.3–14.9)
RBC # BLD: 2.65 M/UL (ref 4.2–5.4)
RBC # BLD: 2.85 M/UL (ref 4.2–5.4)
RBC # BLD: 2.87 M/UL (ref 4.2–5.4)
RBC # BLD: 3.01 M/UL (ref 4.2–5.4)
RBC # BLD: 3.09 M/UL (ref 4.2–5.4)
RBC # BLD: 3.1 M/UL (ref 4.2–5.4)
RBC # BLD: 3.19 M/UL (ref 4.2–5.4)
RBC # BLD: 3.21 M/UL (ref 4.2–5.4)
RBC # BLD: 3.28 M/UL (ref 4.2–5.4)
RBC # BLD: 3.33 M/UL (ref 4.2–5.4)
RBC # BLD: 3.34 M/UL (ref 4.2–5.4)
RBC # BLD: 3.38 M/UL (ref 4.2–5.4)
RBC # BLD: 3.39 M/UL (ref 4.2–5.4)
RBC # BLD: 3.43 M/UL (ref 4.2–5.4)
RBC # BLD: 3.44 M/UL (ref 4.2–5.4)
RBC # BLD: 3.49 M/UL (ref 4.2–5.4)
RBC # BLD: 3.56 M/UL (ref 4.2–5.4)
RBC # BLD: 3.57 M/UL (ref 4.2–5.4)
RBC # BLD: 3.63 M/UL (ref 4.2–5.4)
RBC # BLD: 3.73 M/UL (ref 4.2–5.4)
RBC # BLD: 4.06 M/UL (ref 4.2–5.4)
RBC # BLD: 4.13 M/UL (ref 4.2–5.4)
RBC UA: >100 /HPF (ref 0–5)
RBC UA: ABNORMAL /HPF (ref 0–2)
REASON FOR REJECTION: NORMAL
REJECTED TEST: NORMAL
SEDIMENTATION RATE, ERYTHROCYTE: 79 MM (ref 0–30)
SLIDE REVIEW: ABNORMAL
SLIDE REVIEW: ABNORMAL
SMUDGE CELLS: 4.1
SODIUM BLD-SCNC: 131 MEQ/L (ref 135–144)
SODIUM BLD-SCNC: 131 MEQ/L (ref 135–144)
SODIUM BLD-SCNC: 132 MEQ/L (ref 135–144)
SODIUM BLD-SCNC: 133 MEQ/L (ref 135–144)
SODIUM BLD-SCNC: 134 MEQ/L (ref 135–144)
SODIUM BLD-SCNC: 135 MEQ/L (ref 135–144)
SODIUM BLD-SCNC: 136 MEQ/L (ref 135–144)
SODIUM BLD-SCNC: 136 MEQ/L (ref 135–144)
SODIUM BLD-SCNC: 138 MEQ/L (ref 135–144)
SODIUM BLD-SCNC: 139 MEQ/L (ref 135–144)
SODIUM BLD-SCNC: 140 MEQ/L (ref 135–144)
SODIUM BLD-SCNC: 141 MEQ/L (ref 135–144)
SODIUM BLD-SCNC: 141 MEQ/L (ref 135–144)
SODIUM BLD-SCNC: 142 MEQ/L (ref 135–144)
SODIUM BLD-SCNC: 145 MEQ/L (ref 135–144)
SPECIFIC GRAVITY UA: 1.02 (ref 1–1.03)
SPECIFIC GRAVITY UA: 1.02 (ref 1–1.03)
STREP PNEUMO AG, UR: NEGATIVE
T4 FREE: 1.38 NG/DL (ref 0.84–1.68)
TCO2 ARTERIAL: 21 (ref 22–29)
TCO2 ARTERIAL: 21 (ref 22–29)
TCO2 ARTERIAL: 22 (ref 22–29)
TCO2 ARTERIAL: 22 (ref 22–29)
TOTAL IRON BINDING CAPACITY: 185 UG/DL (ref 178–450)
TOTAL PROTEIN: 5.9 G/DL (ref 6.3–8)
TOTAL PROTEIN: 5.9 G/DL (ref 6.3–8)
TOTAL PROTEIN: 6.2 G/DL (ref 6.3–8)
TOTAL PROTEIN: 6.5 G/DL (ref 6.3–8)
TOTAL PROTEIN: 6.9 G/DL (ref 6.3–8)
TOTAL PROTEIN: 7.1 G/DL (ref 6.3–8)
TROPONIN: 0.02 NG/ML (ref 0–0.01)
TROPONIN: <0.01 NG/ML (ref 0–0.01)
TSH SERPL DL<=0.05 MIU/L-ACNC: 1.93 UIU/ML (ref 0.44–3.86)
URINE CULTURE, ROUTINE: NORMAL
URINE CULTURE, ROUTINE: NORMAL
URINE REFLEX TO CULTURE: YES
UROBILINOGEN, URINE: 0.2 E.U./DL
UROBILINOGEN, URINE: 0.2 E.U./DL
WBC # BLD: 10.8 K/UL (ref 4.8–10.8)
WBC # BLD: 10.9 K/UL (ref 4.8–10.8)
WBC # BLD: 10.9 K/UL (ref 4.8–10.8)
WBC # BLD: 11.5 K/UL (ref 4.8–10.8)
WBC # BLD: 11.6 K/UL (ref 4.8–10.8)
WBC # BLD: 11.9 K/UL (ref 4.8–10.8)
WBC # BLD: 11.9 K/UL (ref 4.8–10.8)
WBC # BLD: 12 K/UL (ref 4.8–10.8)
WBC # BLD: 12 K/UL (ref 4.8–10.8)
WBC # BLD: 12.3 K/UL (ref 4.8–10.8)
WBC # BLD: 13.1 K/UL (ref 4.8–10.8)
WBC # BLD: 13.4 K/UL (ref 4.8–10.8)
WBC # BLD: 14.1 K/UL (ref 4.8–10.8)
WBC # BLD: 14.3 K/UL (ref 4.8–10.8)
WBC # BLD: 14.4 K/UL (ref 4.8–10.8)
WBC # BLD: 14.9 K/UL (ref 4.8–10.8)
WBC # BLD: 15.6 K/UL (ref 4.8–10.8)
WBC # BLD: 15.8 K/UL (ref 4.8–10.8)
WBC # BLD: 17.6 K/UL (ref 4.8–10.8)
WBC # BLD: 19.3 K/UL (ref 4.8–10.8)
WBC # BLD: 8.8 K/UL (ref 4.8–10.8)
WBC # BLD: 9 K/UL (ref 4.8–10.8)
WBC UA: ABNORMAL /HPF (ref 0–5)
WBC UA: ABNORMAL /HPF (ref 0–5)
YEAST: PRESENT

## 2019-01-01 PROCEDURE — 2580000003 HC RX 258: Performed by: INTERNAL MEDICINE

## 2019-01-01 PROCEDURE — 93010 ELECTROCARDIOGRAM REPORT: CPT | Performed by: INTERNAL MEDICINE

## 2019-01-01 PROCEDURE — 84295 ASSAY OF SERUM SODIUM: CPT

## 2019-01-01 PROCEDURE — 71046 X-RAY EXAM CHEST 2 VIEWS: CPT

## 2019-01-01 PROCEDURE — 80048 BASIC METABOLIC PNL TOTAL CA: CPT

## 2019-01-01 PROCEDURE — 6370000000 HC RX 637 (ALT 250 FOR IP): Performed by: INTERNAL MEDICINE

## 2019-01-01 PROCEDURE — 82948 REAGENT STRIP/BLOOD GLUCOSE: CPT

## 2019-01-01 PROCEDURE — 83540 ASSAY OF IRON: CPT

## 2019-01-01 PROCEDURE — 87449 NOS EACH ORGANISM AG IA: CPT

## 2019-01-01 PROCEDURE — 2060000000 HC ICU INTERMEDIATE R&B

## 2019-01-01 PROCEDURE — 87147 CULTURE TYPE IMMUNOLOGIC: CPT

## 2019-01-01 PROCEDURE — 83735 ASSAY OF MAGNESIUM: CPT

## 2019-01-01 PROCEDURE — 2580000003 HC RX 258: Performed by: PHYSICIAN ASSISTANT

## 2019-01-01 PROCEDURE — 99232 SBSQ HOSP IP/OBS MODERATE 35: CPT | Performed by: INTERNAL MEDICINE

## 2019-01-01 PROCEDURE — 6370000000 HC RX 637 (ALT 250 FOR IP): Performed by: EMERGENCY MEDICINE

## 2019-01-01 PROCEDURE — 85025 COMPLETE CBC W/AUTO DIFF WBC: CPT

## 2019-01-01 PROCEDURE — 0JH606Z INSERTION OF PACEMAKER, DUAL CHAMBER INTO CHEST SUBCUTANEOUS TISSUE AND FASCIA, OPEN APPROACH: ICD-10-PCS | Performed by: INTERNAL MEDICINE

## 2019-01-01 PROCEDURE — 94761 N-INVAS EAR/PLS OXIMETRY MLT: CPT

## 2019-01-01 PROCEDURE — 94668 MNPJ CHEST WALL SBSQ: CPT

## 2019-01-01 PROCEDURE — 71045 X-RAY EXAM CHEST 1 VIEW: CPT

## 2019-01-01 PROCEDURE — 36415 COLL VENOUS BLD VENIPUNCTURE: CPT

## 2019-01-01 PROCEDURE — 99291 CRITICAL CARE FIRST HOUR: CPT | Performed by: INTERNAL MEDICINE

## 2019-01-01 PROCEDURE — 86900 BLOOD TYPING SEROLOGIC ABO: CPT

## 2019-01-01 PROCEDURE — 6360000002 HC RX W HCPCS: Performed by: PHYSICIAN ASSISTANT

## 2019-01-01 PROCEDURE — 85027 COMPLETE CBC AUTOMATED: CPT

## 2019-01-01 PROCEDURE — 1210000000 HC MED SURG R&B

## 2019-01-01 PROCEDURE — 2500000003 HC RX 250 WO HCPCS: Performed by: INTERNAL MEDICINE

## 2019-01-01 PROCEDURE — 83605 ASSAY OF LACTIC ACID: CPT

## 2019-01-01 PROCEDURE — 6360000002 HC RX W HCPCS: Performed by: INTERNAL MEDICINE

## 2019-01-01 PROCEDURE — P9612 CATHETERIZE FOR URINE SPEC: HCPCS

## 2019-01-01 PROCEDURE — 83880 ASSAY OF NATRIURETIC PEPTIDE: CPT

## 2019-01-01 PROCEDURE — 84145 PROCALCITONIN (PCT): CPT

## 2019-01-01 PROCEDURE — 86850 RBC ANTIBODY SCREEN: CPT

## 2019-01-01 PROCEDURE — 99284 EMERGENCY DEPT VISIT MOD MDM: CPT

## 2019-01-01 PROCEDURE — 94729 DIFFUSING CAPACITY: CPT | Performed by: INTERNAL MEDICINE

## 2019-01-01 PROCEDURE — 70486 CT MAXILLOFACIAL W/O DYE: CPT

## 2019-01-01 PROCEDURE — 74177 CT ABD & PELVIS W/CONTRAST: CPT

## 2019-01-01 PROCEDURE — 2500000003 HC RX 250 WO HCPCS

## 2019-01-01 PROCEDURE — 94150 VITAL CAPACITY TEST: CPT

## 2019-01-01 PROCEDURE — 02HK3JZ INSERTION OF PACEMAKER LEAD INTO RIGHT VENTRICLE, PERCUTANEOUS APPROACH: ICD-10-PCS | Performed by: INTERNAL MEDICINE

## 2019-01-01 PROCEDURE — 87086 URINE CULTURE/COLONY COUNT: CPT

## 2019-01-01 PROCEDURE — 97535 SELF CARE MNGMENT TRAINING: CPT

## 2019-01-01 PROCEDURE — 80069 RENAL FUNCTION PANEL: CPT

## 2019-01-01 PROCEDURE — 2700000000 HC OXYGEN THERAPY PER DAY

## 2019-01-01 PROCEDURE — 94729 DIFFUSING CAPACITY: CPT

## 2019-01-01 PROCEDURE — 81001 URINALYSIS AUTO W/SCOPE: CPT

## 2019-01-01 PROCEDURE — 6370000000 HC RX 637 (ALT 250 FOR IP): Performed by: NURSE PRACTITIONER

## 2019-01-01 PROCEDURE — 86901 BLOOD TYPING SEROLOGIC RH(D): CPT

## 2019-01-01 PROCEDURE — C1785 PMKR, DUAL, RATE-RESP: HCPCS

## 2019-01-01 PROCEDURE — 92526 ORAL FUNCTION THERAPY: CPT

## 2019-01-01 PROCEDURE — 94640 AIRWAY INHALATION TREATMENT: CPT

## 2019-01-01 PROCEDURE — 36600 WITHDRAWAL OF ARTERIAL BLOOD: CPT

## 2019-01-01 PROCEDURE — 2709999900 HC NON-CHARGEABLE SUPPLY

## 2019-01-01 PROCEDURE — 94660 CPAP INITIATION&MGMT: CPT

## 2019-01-01 PROCEDURE — C9113 INJ PANTOPRAZOLE SODIUM, VIA: HCPCS | Performed by: NURSE PRACTITIONER

## 2019-01-01 PROCEDURE — 97163 PT EVAL HIGH COMPLEX 45 MIN: CPT

## 2019-01-01 PROCEDURE — 87070 CULTURE OTHR SPECIMN AEROBIC: CPT

## 2019-01-01 PROCEDURE — 2580000003 HC RX 258: Performed by: NURSE PRACTITIONER

## 2019-01-01 PROCEDURE — 6360000002 HC RX W HCPCS: Performed by: EMERGENCY MEDICINE

## 2019-01-01 PROCEDURE — 93005 ELECTROCARDIOGRAM TRACING: CPT | Performed by: EMERGENCY MEDICINE

## 2019-01-01 PROCEDURE — 2500000003 HC RX 250 WO HCPCS: Performed by: EMERGENCY MEDICINE

## 2019-01-01 PROCEDURE — 96361 HYDRATE IV INFUSION ADD-ON: CPT

## 2019-01-01 PROCEDURE — 6360000002 HC RX W HCPCS: Performed by: NURSE PRACTITIONER

## 2019-01-01 PROCEDURE — 93005 ELECTROCARDIOGRAM TRACING: CPT | Performed by: INTERNAL MEDICINE

## 2019-01-01 PROCEDURE — 99285 EMERGENCY DEPT VISIT HI MDM: CPT

## 2019-01-01 PROCEDURE — 85730 THROMBOPLASTIN TIME PARTIAL: CPT

## 2019-01-01 PROCEDURE — 2580000003 HC RX 258: Performed by: EMERGENCY MEDICINE

## 2019-01-01 PROCEDURE — 99213 OFFICE O/P EST LOW 20 MIN: CPT | Performed by: FAMILY MEDICINE

## 2019-01-01 PROCEDURE — 94664 DEMO&/EVAL PT USE INHALER: CPT

## 2019-01-01 PROCEDURE — 6370000000 HC RX 637 (ALT 250 FOR IP): Performed by: PHYSICIAN ASSISTANT

## 2019-01-01 PROCEDURE — 84484 ASSAY OF TROPONIN QUANT: CPT

## 2019-01-01 PROCEDURE — 2000000000 HC ICU R&B

## 2019-01-01 PROCEDURE — 71275 CT ANGIOGRAPHY CHEST: CPT

## 2019-01-01 PROCEDURE — 74230 X-RAY XM SWLNG FUNCJ C+: CPT

## 2019-01-01 PROCEDURE — 80053 COMPREHEN METABOLIC PANEL: CPT

## 2019-01-01 PROCEDURE — 85610 PROTHROMBIN TIME: CPT

## 2019-01-01 PROCEDURE — 94667 MNPJ CHEST WALL 1ST: CPT

## 2019-01-01 PROCEDURE — 87186 SC STD MICRODIL/AGAR DIL: CPT

## 2019-01-01 PROCEDURE — 97116 GAIT TRAINING THERAPY: CPT

## 2019-01-01 PROCEDURE — 92610 EVALUATE SWALLOWING FUNCTION: CPT

## 2019-01-01 PROCEDURE — 99223 1ST HOSP IP/OBS HIGH 75: CPT | Performed by: INTERNAL MEDICINE

## 2019-01-01 PROCEDURE — 99496 TRANSJ CARE MGMT HIGH F2F 7D: CPT | Performed by: NURSE PRACTITIONER

## 2019-01-01 PROCEDURE — 94060 EVALUATION OF WHEEZING: CPT | Performed by: INTERNAL MEDICINE

## 2019-01-01 PROCEDURE — 2580000003 HC RX 258

## 2019-01-01 PROCEDURE — 93280 PM DEVICE PROGR EVAL DUAL: CPT

## 2019-01-01 PROCEDURE — 87040 BLOOD CULTURE FOR BACTERIA: CPT

## 2019-01-01 PROCEDURE — 86923 COMPATIBILITY TEST ELECTRIC: CPT

## 2019-01-01 PROCEDURE — 82435 ASSAY OF BLOOD CHLORIDE: CPT

## 2019-01-01 PROCEDURE — 82565 ASSAY OF CREATININE: CPT

## 2019-01-01 PROCEDURE — 83690 ASSAY OF LIPASE: CPT

## 2019-01-01 PROCEDURE — 97166 OT EVAL MOD COMPLEX 45 MIN: CPT

## 2019-01-01 PROCEDURE — 82330 ASSAY OF CALCIUM: CPT

## 2019-01-01 PROCEDURE — 99213 OFFICE O/P EST LOW 20 MIN: CPT | Performed by: NURSE PRACTITIONER

## 2019-01-01 PROCEDURE — 02K83ZZ MAP CONDUCTION MECHANISM, PERCUTANEOUS APPROACH: ICD-10-PCS | Performed by: INTERNAL MEDICINE

## 2019-01-01 PROCEDURE — 96375 TX/PRO/DX INJ NEW DRUG ADDON: CPT

## 2019-01-01 PROCEDURE — 99214 OFFICE O/P EST MOD 30 MIN: CPT | Performed by: INTERNAL MEDICINE

## 2019-01-01 PROCEDURE — 94760 N-INVAS EAR/PLS OXIMETRY 1: CPT

## 2019-01-01 PROCEDURE — 99222 1ST HOSP IP/OBS MODERATE 55: CPT | Performed by: SPECIALIST

## 2019-01-01 PROCEDURE — 1111F DSCHRG MED/CURRENT MED MERGE: CPT | Performed by: FAMILY MEDICINE

## 2019-01-01 PROCEDURE — 87899 AGENT NOS ASSAY W/OPTIC: CPT

## 2019-01-01 PROCEDURE — 94060 EVALUATION OF WHEEZING: CPT

## 2019-01-01 PROCEDURE — 6370000000 HC RX 637 (ALT 250 FOR IP): Performed by: PSYCHIATRY & NEUROLOGY

## 2019-01-01 PROCEDURE — 97110 THERAPEUTIC EXERCISES: CPT

## 2019-01-01 PROCEDURE — 86141 C-REACTIVE PROTEIN HS: CPT

## 2019-01-01 PROCEDURE — C1898 LEAD, PMKR, OTHER THAN TRANS: HCPCS

## 2019-01-01 PROCEDURE — G8510 SCR DEP NEG, NO PLAN REQD: HCPCS | Performed by: NURSE PRACTITIONER

## 2019-01-01 PROCEDURE — 82803 BLOOD GASES ANY COMBINATION: CPT

## 2019-01-01 PROCEDURE — 87205 SMEAR GRAM STAIN: CPT

## 2019-01-01 PROCEDURE — 93650 ICAR CATH ABLTJ AV NODE FUNC: CPT | Performed by: INTERNAL MEDICINE

## 2019-01-01 PROCEDURE — 90653 IIV ADJUVANT VACCINE IM: CPT | Performed by: FAMILY MEDICINE

## 2019-01-01 PROCEDURE — 97162 PT EVAL MOD COMPLEX 30 MIN: CPT

## 2019-01-01 PROCEDURE — 96365 THER/PROPH/DIAG IV INF INIT: CPT

## 2019-01-01 PROCEDURE — 70450 CT HEAD/BRAIN W/O DYE: CPT

## 2019-01-01 PROCEDURE — 36430 TRANSFUSION BLD/BLD COMPNT: CPT

## 2019-01-01 PROCEDURE — 99233 SBSQ HOSP IP/OBS HIGH 50: CPT | Performed by: INTERNAL MEDICINE

## 2019-01-01 PROCEDURE — 96374 THER/PROPH/DIAG INJ IV PUSH: CPT

## 2019-01-01 PROCEDURE — C1894 INTRO/SHEATH, NON-LASER: HCPCS

## 2019-01-01 PROCEDURE — 85014 HEMATOCRIT: CPT

## 2019-01-01 PROCEDURE — 99223 1ST HOSP IP/OBS HIGH 75: CPT | Performed by: PSYCHIATRY & NEUROLOGY

## 2019-01-01 PROCEDURE — 94726 PLETHYSMOGRAPHY LUNG VOLUMES: CPT | Performed by: INTERNAL MEDICINE

## 2019-01-01 PROCEDURE — 1111F DSCHRG MED/CURRENT MED MERGE: CPT | Performed by: NURSE PRACTITIONER

## 2019-01-01 PROCEDURE — 92611 MOTION FLUOROSCOPY/SWALLOW: CPT

## 2019-01-01 PROCEDURE — 83550 IRON BINDING TEST: CPT

## 2019-01-01 PROCEDURE — 93306 TTE W/DOPPLER COMPLETE: CPT

## 2019-01-01 PROCEDURE — 2780000010 HC IMPLANT OTHER

## 2019-01-01 PROCEDURE — 6360000004 HC RX CONTRAST MEDICATION: Performed by: EMERGENCY MEDICINE

## 2019-01-01 PROCEDURE — 33208 INSRT HEART PM ATRIAL & VENT: CPT | Performed by: INTERNAL MEDICINE

## 2019-01-01 PROCEDURE — 84132 ASSAY OF SERUM POTASSIUM: CPT

## 2019-01-01 PROCEDURE — P9016 RBC LEUKOCYTES REDUCED: HCPCS

## 2019-01-01 PROCEDURE — 51702 INSERT TEMP BLADDER CATH: CPT

## 2019-01-01 PROCEDURE — 87077 CULTURE AEROBIC IDENTIFY: CPT

## 2019-01-01 PROCEDURE — 02583ZZ DESTRUCTION OF CONDUCTION MECHANISM, PERCUTANEOUS APPROACH: ICD-10-PCS | Performed by: INTERNAL MEDICINE

## 2019-01-01 PROCEDURE — 94726 PLETHYSMOGRAPHY LUNG VOLUMES: CPT

## 2019-01-01 PROCEDURE — 84100 ASSAY OF PHOSPHORUS: CPT

## 2019-01-01 PROCEDURE — 85652 RBC SED RATE AUTOMATED: CPT

## 2019-01-01 PROCEDURE — 6360000002 HC RX W HCPCS

## 2019-01-01 PROCEDURE — 99215 OFFICE O/P EST HI 40 MIN: CPT | Performed by: FAMILY MEDICINE

## 2019-01-01 PROCEDURE — G0008 ADMIN INFLUENZA VIRUS VAC: HCPCS | Performed by: FAMILY MEDICINE

## 2019-01-01 PROCEDURE — 99233 SBSQ HOSP IP/OBS HIGH 50: CPT | Performed by: PSYCHIATRY & NEUROLOGY

## 2019-01-01 PROCEDURE — 02H63JZ INSERTION OF PACEMAKER LEAD INTO RIGHT ATRIUM, PERCUTANEOUS APPROACH: ICD-10-PCS | Performed by: INTERNAL MEDICINE

## 2019-01-01 PROCEDURE — 72100 X-RAY EXAM L-S SPINE 2/3 VWS: CPT

## 2019-01-01 PROCEDURE — C1732 CATH, EP, DIAG/ABL, 3D/VECT: HCPCS

## 2019-01-01 RX ORDER — ONDANSETRON 2 MG/ML
4 INJECTION INTRAMUSCULAR; INTRAVENOUS ONCE
Status: COMPLETED | OUTPATIENT
Start: 2019-01-01 | End: 2019-01-01

## 2019-01-01 RX ORDER — SODIUM CHLORIDE 0.9 % (FLUSH) 0.9 %
10 SYRINGE (ML) INJECTION PRN
Status: DISCONTINUED | OUTPATIENT
Start: 2019-01-01 | End: 2019-01-01 | Stop reason: HOSPADM

## 2019-01-01 RX ORDER — PANTOPRAZOLE SODIUM 40 MG/1
TABLET, DELAYED RELEASE ORAL
Qty: 30 TABLET | Refills: 0 | Status: SHIPPED | OUTPATIENT
Start: 2019-01-01 | End: 2020-01-01

## 2019-01-01 RX ORDER — GUAIFENESIN/DEXTROMETHORPHAN 100-10MG/5
5 SYRUP ORAL EVERY 4 HOURS PRN
Status: DISCONTINUED | OUTPATIENT
Start: 2019-01-01 | End: 2019-01-01 | Stop reason: HOSPADM

## 2019-01-01 RX ORDER — METHYLPREDNISOLONE SODIUM SUCCINATE 125 MG/2ML
60 INJECTION, POWDER, LYOPHILIZED, FOR SOLUTION INTRAMUSCULAR; INTRAVENOUS EVERY 12 HOURS
Status: DISCONTINUED | OUTPATIENT
Start: 2019-01-01 | End: 2019-01-01

## 2019-01-01 RX ORDER — SODIUM CHLORIDE 9 MG/ML
10 INJECTION INTRAVENOUS EVERY 12 HOURS
Status: DISCONTINUED | OUTPATIENT
Start: 2019-01-01 | End: 2019-01-01 | Stop reason: HOSPADM

## 2019-01-01 RX ORDER — PANTOPRAZOLE SODIUM 40 MG/1
TABLET, DELAYED RELEASE ORAL
Qty: 30 TABLET | Refills: 2 | Status: SHIPPED | OUTPATIENT
Start: 2019-01-01 | End: 2019-01-01

## 2019-01-01 RX ORDER — FLUTICASONE PROPIONATE 50 MCG
2 SPRAY, SUSPENSION (ML) NASAL DAILY
Status: DISCONTINUED | OUTPATIENT
Start: 2019-01-01 | End: 2019-01-01 | Stop reason: HOSPADM

## 2019-01-01 RX ORDER — METOPROLOL SUCCINATE 100 MG/1
100 TABLET, EXTENDED RELEASE ORAL DAILY
Status: DISCONTINUED | OUTPATIENT
Start: 2019-01-01 | End: 2019-01-01

## 2019-01-01 RX ORDER — SODIUM CHLORIDE, SODIUM LACTATE, POTASSIUM CHLORIDE, AND CALCIUM CHLORIDE .6; .31; .03; .02 G/100ML; G/100ML; G/100ML; G/100ML
30 INJECTION, SOLUTION INTRAVENOUS ONCE
Status: COMPLETED | OUTPATIENT
Start: 2019-01-01 | End: 2019-01-01

## 2019-01-01 RX ORDER — DOXYCYCLINE HYCLATE 100 MG/1
100 CAPSULE ORAL EVERY 12 HOURS SCHEDULED
Status: DISCONTINUED | OUTPATIENT
Start: 2019-01-01 | End: 2019-01-01 | Stop reason: HOSPADM

## 2019-01-01 RX ORDER — MAGNESIUM OXIDE 400 MG/1
400 TABLET ORAL DAILY
COMMUNITY

## 2019-01-01 RX ORDER — CALCIUM GLUCONATE 94 MG/ML
1 INJECTION, SOLUTION INTRAVENOUS ONCE
Status: DISCONTINUED | OUTPATIENT
Start: 2019-01-01 | End: 2019-01-01

## 2019-01-01 RX ORDER — ANAGRELIDE 0.5 MG/1
0.5 CAPSULE ORAL 2 TIMES DAILY
Status: DISCONTINUED | OUTPATIENT
Start: 2019-01-01 | End: 2019-01-01 | Stop reason: HOSPADM

## 2019-01-01 RX ORDER — DOXYCYCLINE HYCLATE 100 MG
100 TABLET ORAL 2 TIMES DAILY
Qty: 14 TABLET | Refills: 0 | Status: SHIPPED | OUTPATIENT
Start: 2019-01-01 | End: 2019-01-01

## 2019-01-01 RX ORDER — METHYLPREDNISOLONE 4 MG/1
TABLET ORAL
Qty: 1 KIT | Refills: 0 | Status: SHIPPED | OUTPATIENT
Start: 2019-01-01 | End: 2019-01-01 | Stop reason: ALTCHOICE

## 2019-01-01 RX ORDER — AZITHROMYCIN 250 MG/1
TABLET, FILM COATED ORAL
Qty: 1 PACKET | Refills: 0 | Status: SHIPPED | OUTPATIENT
Start: 2019-01-01 | End: 2019-01-01

## 2019-01-01 RX ORDER — ALBUTEROL SULFATE 2.5 MG/3ML
2.5 SOLUTION RESPIRATORY (INHALATION)
Status: DISCONTINUED | OUTPATIENT
Start: 2019-01-01 | End: 2019-01-01 | Stop reason: HOSPADM

## 2019-01-01 RX ORDER — LEVOFLOXACIN 5 MG/ML
500 INJECTION, SOLUTION INTRAVENOUS EVERY 24 HOURS
Status: DISCONTINUED | OUTPATIENT
Start: 2019-01-01 | End: 2019-01-01

## 2019-01-01 RX ORDER — DEXTROSE MONOHYDRATE 25 G/50ML
25 INJECTION, SOLUTION INTRAVENOUS ONCE
Status: DISCONTINUED | OUTPATIENT
Start: 2019-01-01 | End: 2019-01-01

## 2019-01-01 RX ORDER — IPRATROPIUM BROMIDE AND ALBUTEROL SULFATE 2.5; .5 MG/3ML; MG/3ML
1 SOLUTION RESPIRATORY (INHALATION)
Status: DISCONTINUED | OUTPATIENT
Start: 2019-01-01 | End: 2019-01-01

## 2019-01-01 RX ORDER — SODIUM CHLORIDE 9 MG/ML
INJECTION, SOLUTION INTRAVENOUS ONCE
Status: COMPLETED | OUTPATIENT
Start: 2019-01-01 | End: 2019-01-01

## 2019-01-01 RX ORDER — ORPHENADRINE CITRATE 30 MG/ML
60 INJECTION INTRAMUSCULAR; INTRAVENOUS ONCE
Status: COMPLETED | OUTPATIENT
Start: 2019-01-01 | End: 2019-01-01

## 2019-01-01 RX ORDER — METOPROLOL SUCCINATE 50 MG/1
50 TABLET, EXTENDED RELEASE ORAL EVERY EVENING
Status: DISCONTINUED | OUTPATIENT
Start: 2019-01-01 | End: 2019-01-01 | Stop reason: HOSPADM

## 2019-01-01 RX ORDER — LABETALOL 20 MG/4 ML (5 MG/ML) INTRAVENOUS SYRINGE
10 ONCE
Status: COMPLETED | OUTPATIENT
Start: 2019-01-01 | End: 2019-01-01

## 2019-01-01 RX ORDER — SODIUM CHLORIDE 9 MG/ML
INJECTION, SOLUTION INTRAVENOUS CONTINUOUS
Status: DISCONTINUED | OUTPATIENT
Start: 2019-01-01 | End: 2019-01-01 | Stop reason: HOSPADM

## 2019-01-01 RX ORDER — MECLIZINE HYDROCHLORIDE 25 MG/1
25 TABLET ORAL 3 TIMES DAILY
Status: DISCONTINUED | OUTPATIENT
Start: 2019-01-01 | End: 2019-01-01 | Stop reason: HOSPADM

## 2019-01-01 RX ORDER — AZITHROMYCIN 250 MG/1
TABLET, FILM COATED ORAL
Qty: 6 TABLET | Refills: 0 | Status: SHIPPED | OUTPATIENT
Start: 2019-01-01 | End: 2019-01-01

## 2019-01-01 RX ORDER — DILTIAZEM HYDROCHLORIDE 5 MG/ML
10 INJECTION INTRAVENOUS ONCE
Status: COMPLETED | OUTPATIENT
Start: 2019-01-01 | End: 2019-01-01

## 2019-01-01 RX ORDER — FLUCONAZOLE 100 MG/1
150 TABLET ORAL DAILY
Status: DISCONTINUED | OUTPATIENT
Start: 2019-01-01 | End: 2019-01-01

## 2019-01-01 RX ORDER — 0.9 % SODIUM CHLORIDE 0.9 %
250 INTRAVENOUS SOLUTION INTRAVENOUS ONCE
Status: COMPLETED | OUTPATIENT
Start: 2019-01-01 | End: 2019-01-01

## 2019-01-01 RX ORDER — SODIUM CHLORIDE 0.9 % (FLUSH) 0.9 %
10 SYRINGE (ML) INJECTION EVERY 12 HOURS SCHEDULED
Status: DISCONTINUED | OUTPATIENT
Start: 2019-01-01 | End: 2019-01-01

## 2019-01-01 RX ORDER — DILTIAZEM HYDROCHLORIDE 5 MG/ML
15 INJECTION INTRAVENOUS ONCE
Status: DISCONTINUED | OUTPATIENT
Start: 2019-01-01 | End: 2019-01-01

## 2019-01-01 RX ORDER — AZITHROMYCIN 250 MG/1
250 TABLET, FILM COATED ORAL DAILY
Qty: 14 TABLET | Refills: 0 | Status: SHIPPED | OUTPATIENT
Start: 2019-01-01 | End: 2019-01-01

## 2019-01-01 RX ORDER — OXYCODONE HYDROCHLORIDE AND ACETAMINOPHEN 5; 325 MG/1; MG/1
1 TABLET ORAL ONCE
Status: COMPLETED | OUTPATIENT
Start: 2019-01-01 | End: 2019-01-01

## 2019-01-01 RX ORDER — CIPROFLOXACIN 2 MG/ML
400 INJECTION, SOLUTION INTRAVENOUS EVERY 12 HOURS
Status: DISCONTINUED | OUTPATIENT
Start: 2019-01-01 | End: 2019-01-01

## 2019-01-01 RX ORDER — VANCOMYCIN HYDROCHLORIDE 1 G/20ML
1000 INJECTION, POWDER, LYOPHILIZED, FOR SOLUTION INTRAVENOUS ONCE
Status: DISCONTINUED | OUTPATIENT
Start: 2019-01-01 | End: 2019-01-01 | Stop reason: CLARIF

## 2019-01-01 RX ORDER — PREDNISONE 20 MG/1
40 TABLET ORAL DAILY
Status: DISCONTINUED | OUTPATIENT
Start: 2019-01-01 | End: 2019-01-01 | Stop reason: HOSPADM

## 2019-01-01 RX ORDER — METOPROLOL TARTRATE 5 MG/5ML
5 INJECTION INTRAVENOUS EVERY 4 HOURS
Status: DISCONTINUED | OUTPATIENT
Start: 2019-01-01 | End: 2019-01-01 | Stop reason: HOSPADM

## 2019-01-01 RX ORDER — IPRATROPIUM BROMIDE AND ALBUTEROL SULFATE 2.5; .5 MG/3ML; MG/3ML
1 SOLUTION RESPIRATORY (INHALATION) PRN
Status: DISCONTINUED | OUTPATIENT
Start: 2019-01-01 | End: 2019-01-01 | Stop reason: SDUPTHER

## 2019-01-01 RX ORDER — FUROSEMIDE 10 MG/ML
40 INJECTION INTRAMUSCULAR; INTRAVENOUS ONCE
Status: COMPLETED | OUTPATIENT
Start: 2019-01-01 | End: 2019-01-01

## 2019-01-01 RX ORDER — METOPROLOL TARTRATE 5 MG/5ML
INJECTION INTRAVENOUS
Status: COMPLETED
Start: 2019-01-01 | End: 2019-01-01

## 2019-01-01 RX ORDER — MORPHINE SULFATE 2 MG/ML
4 INJECTION, SOLUTION INTRAMUSCULAR; INTRAVENOUS ONCE
Status: COMPLETED | OUTPATIENT
Start: 2019-01-01 | End: 2019-01-01

## 2019-01-01 RX ORDER — METHYLPREDNISOLONE SODIUM SUCCINATE 125 MG/2ML
125 INJECTION, POWDER, LYOPHILIZED, FOR SOLUTION INTRAMUSCULAR; INTRAVENOUS ONCE
Status: COMPLETED | OUTPATIENT
Start: 2019-01-01 | End: 2019-01-01

## 2019-01-01 RX ORDER — ONDANSETRON 4 MG/1
4 TABLET, ORALLY DISINTEGRATING ORAL ONCE
Status: COMPLETED | OUTPATIENT
Start: 2019-01-01 | End: 2019-01-01

## 2019-01-01 RX ORDER — SODIUM CHLORIDE 9 MG/ML
INJECTION, SOLUTION INTRAVENOUS CONTINUOUS
Status: DISCONTINUED | OUTPATIENT
Start: 2019-01-01 | End: 2019-01-01

## 2019-01-01 RX ORDER — METHYLPREDNISOLONE SODIUM SUCCINATE 40 MG/ML
40 INJECTION, POWDER, LYOPHILIZED, FOR SOLUTION INTRAMUSCULAR; INTRAVENOUS EVERY 8 HOURS
Status: DISCONTINUED | OUTPATIENT
Start: 2019-01-01 | End: 2019-01-01

## 2019-01-01 RX ORDER — DOXYCYCLINE HYCLATE 100 MG/1
100 CAPSULE ORAL EVERY 12 HOURS SCHEDULED
Status: DISCONTINUED | OUTPATIENT
Start: 2019-01-01 | End: 2019-01-01

## 2019-01-01 RX ORDER — CETIRIZINE HYDROCHLORIDE 10 MG/1
10 TABLET ORAL DAILY
Qty: 90 TABLET | Refills: 1 | Status: SHIPPED | OUTPATIENT
Start: 2019-01-01

## 2019-01-01 RX ORDER — TRAZODONE HYDROCHLORIDE 50 MG/1
50 TABLET ORAL NIGHTLY PRN
Qty: 30 TABLET | Refills: 0 | Status: SHIPPED | OUTPATIENT
Start: 2019-01-01 | End: 2020-01-01

## 2019-01-01 RX ORDER — ONDANSETRON 2 MG/ML
4 INJECTION INTRAMUSCULAR; INTRAVENOUS EVERY 6 HOURS PRN
Status: DISCONTINUED | OUTPATIENT
Start: 2019-01-01 | End: 2019-01-01 | Stop reason: HOSPADM

## 2019-01-01 RX ORDER — ALBUTEROL SULFATE 2.5 MG/3ML
2.5 SOLUTION RESPIRATORY (INHALATION) ONCE
Status: COMPLETED | OUTPATIENT
Start: 2019-01-01 | End: 2019-01-01

## 2019-01-01 RX ORDER — TRAMADOL HYDROCHLORIDE 50 MG/1
50 TABLET ORAL EVERY 4 HOURS PRN
Qty: 18 TABLET | Refills: 0 | Status: SHIPPED | OUTPATIENT
Start: 2019-01-01 | End: 2019-01-01

## 2019-01-01 RX ORDER — LABETALOL 20 MG/4 ML (5 MG/ML) INTRAVENOUS SYRINGE
DAILY PRN
Status: COMPLETED | OUTPATIENT
Start: 2019-01-01 | End: 2019-01-01

## 2019-01-01 RX ORDER — TRAMADOL HYDROCHLORIDE 50 MG/1
100 TABLET ORAL EVERY 6 HOURS PRN
Status: DISCONTINUED | OUTPATIENT
Start: 2019-01-01 | End: 2019-01-01

## 2019-01-01 RX ORDER — CLINDAMYCIN HYDROCHLORIDE 300 MG/1
300 CAPSULE ORAL 3 TIMES DAILY
Status: DISCONTINUED | OUTPATIENT
Start: 2019-01-01 | End: 2019-01-01

## 2019-01-01 RX ORDER — FUROSEMIDE 10 MG/ML
INJECTION INTRAMUSCULAR; INTRAVENOUS DAILY PRN
Status: COMPLETED | OUTPATIENT
Start: 2019-01-01 | End: 2019-01-01

## 2019-01-01 RX ORDER — SODIUM CHLORIDE 0.9 % (FLUSH) 0.9 %
10 SYRINGE (ML) INJECTION EVERY 12 HOURS SCHEDULED
Status: DISCONTINUED | OUTPATIENT
Start: 2019-01-01 | End: 2019-01-01 | Stop reason: HOSPADM

## 2019-01-01 RX ORDER — ACETAMINOPHEN 325 MG/1
650 TABLET ORAL EVERY 4 HOURS PRN
Status: DISCONTINUED | OUTPATIENT
Start: 2019-01-01 | End: 2019-01-01 | Stop reason: HOSPADM

## 2019-01-01 RX ORDER — DOXYCYCLINE HYCLATE 100 MG/1
100 CAPSULE ORAL EVERY 12 HOURS SCHEDULED
Qty: 14 CAPSULE | Refills: 0 | Status: SHIPPED | OUTPATIENT
Start: 2019-01-01 | End: 2019-01-01

## 2019-01-01 RX ORDER — 0.9 % SODIUM CHLORIDE 0.9 %
1000 INTRAVENOUS SOLUTION INTRAVENOUS ONCE
Status: COMPLETED | OUTPATIENT
Start: 2019-01-01 | End: 2019-01-01

## 2019-01-01 RX ORDER — POTASSIUM CHLORIDE 750 MG/1
40 CAPSULE, EXTENDED RELEASE ORAL 2 TIMES DAILY WITH MEALS
Status: DISCONTINUED | OUTPATIENT
Start: 2019-01-01 | End: 2019-01-01

## 2019-01-01 RX ORDER — SCOLOPAMINE TRANSDERMAL SYSTEM 1 MG/1
1 PATCH, EXTENDED RELEASE TRANSDERMAL
Status: DISCONTINUED | OUTPATIENT
Start: 2019-01-01 | End: 2019-01-01 | Stop reason: HOSPADM

## 2019-01-01 RX ORDER — MAGNESIUM SULFATE 1 G/100ML
1 INJECTION INTRAVENOUS PRN
Status: DISCONTINUED | OUTPATIENT
Start: 2019-01-01 | End: 2019-01-01 | Stop reason: HOSPADM

## 2019-01-01 RX ORDER — TRAZODONE HYDROCHLORIDE 50 MG/1
50 TABLET ORAL NIGHTLY PRN
Status: DISCONTINUED | OUTPATIENT
Start: 2019-01-01 | End: 2019-01-01 | Stop reason: HOSPADM

## 2019-01-01 RX ORDER — MECLIZINE HYDROCHLORIDE 25 MG/1
25 TABLET ORAL 3 TIMES DAILY
Qty: 30 TABLET | Refills: 0 | Status: SHIPPED | OUTPATIENT
Start: 2019-01-01 | End: 2019-01-01

## 2019-01-01 RX ORDER — IPRATROPIUM BROMIDE AND ALBUTEROL SULFATE 2.5; .5 MG/3ML; MG/3ML
1 SOLUTION RESPIRATORY (INHALATION) EVERY 4 HOURS PRN
Status: DISCONTINUED | OUTPATIENT
Start: 2019-01-01 | End: 2019-01-01

## 2019-01-01 RX ORDER — METOPROLOL SUCCINATE 100 MG/1
100 TABLET, EXTENDED RELEASE ORAL EVERY MORNING
Status: DISCONTINUED | OUTPATIENT
Start: 2019-01-01 | End: 2019-01-01 | Stop reason: HOSPADM

## 2019-01-01 RX ORDER — PREDNISONE 10 MG/1
20 TABLET ORAL DAILY
Status: COMPLETED | OUTPATIENT
Start: 2019-01-01 | End: 2019-01-01

## 2019-01-01 RX ORDER — METOPROLOL TARTRATE 5 MG/5ML
5 INJECTION INTRAVENOUS ONCE
Status: COMPLETED | OUTPATIENT
Start: 2019-01-01 | End: 2019-01-01

## 2019-01-01 RX ORDER — PANTOPRAZOLE SODIUM 40 MG/1
40 TABLET, DELAYED RELEASE ORAL DAILY
Status: DISCONTINUED | OUTPATIENT
Start: 2019-01-01 | End: 2019-01-01 | Stop reason: HOSPADM

## 2019-01-01 RX ORDER — GUAIFENESIN/DEXTROMETHORPHAN 100-10MG/5
10 SYRUP ORAL NIGHTLY PRN
Status: DISCONTINUED | OUTPATIENT
Start: 2019-01-01 | End: 2019-01-01 | Stop reason: HOSPADM

## 2019-01-01 RX ORDER — KETOROLAC TROMETHAMINE 15 MG/ML
15 INJECTION, SOLUTION INTRAMUSCULAR; INTRAVENOUS EVERY 6 HOURS PRN
Status: DISCONTINUED | OUTPATIENT
Start: 2019-01-01 | End: 2019-01-01 | Stop reason: HOSPADM

## 2019-01-01 RX ORDER — METHYLPREDNISOLONE SODIUM SUCCINATE 40 MG/ML
40 INJECTION, POWDER, LYOPHILIZED, FOR SOLUTION INTRAMUSCULAR; INTRAVENOUS EVERY 12 HOURS
Status: DISCONTINUED | OUTPATIENT
Start: 2019-01-01 | End: 2019-01-01

## 2019-01-01 RX ORDER — CLINDAMYCIN HYDROCHLORIDE 300 MG/1
300 CAPSULE ORAL 3 TIMES DAILY
Status: ON HOLD | COMMUNITY
End: 2019-01-01 | Stop reason: HOSPADM

## 2019-01-01 RX ORDER — GUAIFENESIN 600 MG/1
600 TABLET, EXTENDED RELEASE ORAL 2 TIMES DAILY
Qty: 60 TABLET | Refills: 0 | Status: SHIPPED | OUTPATIENT
Start: 2019-01-01 | End: 2019-01-01

## 2019-01-01 RX ORDER — PANTOPRAZOLE SODIUM 40 MG/10ML
40 INJECTION, POWDER, LYOPHILIZED, FOR SOLUTION INTRAVENOUS DAILY
Status: DISCONTINUED | OUTPATIENT
Start: 2019-01-01 | End: 2019-01-01 | Stop reason: HOSPADM

## 2019-01-01 RX ORDER — METOPROLOL TARTRATE 5 MG/5ML
5 INJECTION INTRAVENOUS EVERY 6 HOURS
Status: DISCONTINUED | OUTPATIENT
Start: 2019-01-01 | End: 2019-01-01

## 2019-01-01 RX ORDER — CALCIUM CARBONATE 260MG(650)
TABLET,CHEWABLE ORAL
COMMUNITY
End: 2019-01-01 | Stop reason: ALTCHOICE

## 2019-01-01 RX ORDER — TRAMADOL HYDROCHLORIDE 50 MG/1
50 TABLET ORAL EVERY 6 HOURS PRN
Status: DISCONTINUED | OUTPATIENT
Start: 2019-01-01 | End: 2019-01-01

## 2019-01-01 RX ORDER — POTASSIUM CHLORIDE 20 MEQ/1
40 TABLET, EXTENDED RELEASE ORAL 2 TIMES DAILY WITH MEALS
Status: DISCONTINUED | OUTPATIENT
Start: 2019-01-01 | End: 2019-01-01

## 2019-01-01 RX ORDER — BENZONATATE 100 MG/1
CAPSULE ORAL
Qty: 30 CAPSULE | Refills: 0 | Status: SHIPPED | OUTPATIENT
Start: 2019-01-01 | End: 2019-01-01 | Stop reason: ALTCHOICE

## 2019-01-01 RX ORDER — DILTIAZEM HYDROCHLORIDE 120 MG/1
120 CAPSULE, COATED, EXTENDED RELEASE ORAL 2 TIMES DAILY
Status: DISCONTINUED | OUTPATIENT
Start: 2019-01-01 | End: 2019-01-01

## 2019-01-01 RX ORDER — CIPROFLOXACIN 500 MG/1
500 TABLET, FILM COATED ORAL EVERY 12 HOURS SCHEDULED
Status: DISCONTINUED | OUTPATIENT
Start: 2019-01-01 | End: 2019-01-01

## 2019-01-01 RX ORDER — TRAMADOL HYDROCHLORIDE 50 MG/1
50 TABLET ORAL ONCE
Status: DISCONTINUED | OUTPATIENT
Start: 2019-01-01 | End: 2019-01-01

## 2019-01-01 RX ORDER — SCOLOPAMINE TRANSDERMAL SYSTEM 1 MG/1
1 PATCH, EXTENDED RELEASE TRANSDERMAL
Qty: 2 PATCH | Refills: 0 | Status: SHIPPED | OUTPATIENT
Start: 2019-01-01 | End: 2019-01-01

## 2019-01-01 RX ORDER — IPRATROPIUM BROMIDE AND ALBUTEROL SULFATE 2.5; .5 MG/3ML; MG/3ML
1 SOLUTION RESPIRATORY (INHALATION) 3 TIMES DAILY
Status: DISCONTINUED | OUTPATIENT
Start: 2019-01-01 | End: 2019-01-01 | Stop reason: HOSPADM

## 2019-01-01 RX ORDER — CIPROFLOXACIN 2 MG/ML
400 INJECTION, SOLUTION INTRAVENOUS ONCE
Status: COMPLETED | OUTPATIENT
Start: 2019-01-01 | End: 2019-01-01

## 2019-01-01 RX ORDER — DIGOXIN 0.25 MG/ML
63 INJECTION INTRAMUSCULAR; INTRAVENOUS EVERY 6 HOURS PRN
Status: DISCONTINUED | OUTPATIENT
Start: 2019-01-01 | End: 2019-01-01

## 2019-01-01 RX ORDER — LEVOFLOXACIN 500 MG/1
500 TABLET, FILM COATED ORAL DAILY
Status: DISCONTINUED | OUTPATIENT
Start: 2019-01-01 | End: 2019-01-01 | Stop reason: HOSPADM

## 2019-01-01 RX ORDER — MORPHINE SULFATE 2 MG/ML
2 INJECTION, SOLUTION INTRAMUSCULAR; INTRAVENOUS EVERY 4 HOURS PRN
Status: DISCONTINUED | OUTPATIENT
Start: 2019-01-01 | End: 2019-01-01 | Stop reason: HOSPADM

## 2019-01-01 RX ORDER — DOXYCYCLINE HYCLATE 100 MG
100 TABLET ORAL 2 TIMES DAILY
Qty: 20 TABLET | Refills: 0 | Status: SHIPPED | OUTPATIENT
Start: 2019-01-01 | End: 2019-01-01

## 2019-01-01 RX ORDER — SODIUM CHLORIDE 0.9 % (FLUSH) 0.9 %
10 SYRINGE (ML) INJECTION PRN
Status: DISCONTINUED | OUTPATIENT
Start: 2019-01-01 | End: 2019-01-01

## 2019-01-01 RX ORDER — ONDANSETRON 4 MG/1
4 TABLET, ORALLY DISINTEGRATING ORAL 3 TIMES DAILY PRN
Qty: 21 TABLET | Refills: 0 | Status: SHIPPED | OUTPATIENT
Start: 2019-01-01 | End: 2020-01-01

## 2019-01-01 RX ORDER — METOPROLOL SUCCINATE 100 MG/1
100 TABLET, EXTENDED RELEASE ORAL DAILY
Status: DISCONTINUED | OUTPATIENT
Start: 2019-01-01 | End: 2019-01-01 | Stop reason: HOSPADM

## 2019-01-01 RX ORDER — GUAIFENESIN 600 MG/1
600 TABLET, EXTENDED RELEASE ORAL 2 TIMES DAILY
Status: DISCONTINUED | OUTPATIENT
Start: 2019-01-01 | End: 2019-01-01 | Stop reason: HOSPADM

## 2019-01-01 RX ORDER — ACETAMINOPHEN 325 MG/1
650 TABLET ORAL EVERY 4 HOURS PRN
Status: DISCONTINUED | OUTPATIENT
Start: 2019-01-01 | End: 2019-01-01 | Stop reason: SDUPTHER

## 2019-01-01 RX ORDER — LEVOFLOXACIN 500 MG/1
500 TABLET, FILM COATED ORAL DAILY
Qty: 10 TABLET | Refills: 0 | Status: SHIPPED | OUTPATIENT
Start: 2019-01-01 | End: 2019-01-01

## 2019-01-01 RX ORDER — DIGOXIN 0.25 MG/ML
125 INJECTION INTRAMUSCULAR; INTRAVENOUS ONCE
Status: COMPLETED | OUTPATIENT
Start: 2019-01-01 | End: 2019-01-01

## 2019-01-01 RX ORDER — NICOTINE POLACRILEX 4 MG
15 LOZENGE BUCCAL PRN
Status: DISCONTINUED | OUTPATIENT
Start: 2019-01-01 | End: 2019-01-01

## 2019-01-01 RX ORDER — MAGNESIUM SULFATE IN WATER 40 MG/ML
2 INJECTION, SOLUTION INTRAVENOUS ONCE
Status: COMPLETED | OUTPATIENT
Start: 2019-01-01 | End: 2019-01-01

## 2019-01-01 RX ORDER — PREDNISONE 10 MG/1
TABLET ORAL
Qty: 30 TABLET | Refills: 0 | Status: ON HOLD | OUTPATIENT
Start: 2019-01-01 | End: 2019-01-01 | Stop reason: HOSPADM

## 2019-01-01 RX ORDER — DEXTROSE MONOHYDRATE 25 G/50ML
12.5 INJECTION, SOLUTION INTRAVENOUS PRN
Status: DISCONTINUED | OUTPATIENT
Start: 2019-01-01 | End: 2019-01-01 | Stop reason: HOSPADM

## 2019-01-01 RX ORDER — ECHINACEA PURPUREA EXTRACT 125 MG
1 TABLET ORAL PRN
Qty: 1 BOTTLE | Refills: 3 | Status: SHIPPED | OUTPATIENT
Start: 2019-01-01 | End: 2020-01-01

## 2019-01-01 RX ORDER — CETIRIZINE HYDROCHLORIDE 10 MG/1
10 TABLET ORAL DAILY
Status: DISCONTINUED | OUTPATIENT
Start: 2019-01-01 | End: 2019-01-01 | Stop reason: HOSPADM

## 2019-01-01 RX ORDER — IPRATROPIUM BROMIDE AND ALBUTEROL SULFATE 2.5; .5 MG/3ML; MG/3ML
1 SOLUTION RESPIRATORY (INHALATION) PRN
Status: DISCONTINUED | OUTPATIENT
Start: 2019-01-01 | End: 2019-01-01

## 2019-01-01 RX ORDER — SODIUM CHLORIDE, SODIUM LACTATE, POTASSIUM CHLORIDE, CALCIUM CHLORIDE 600; 310; 30; 20 MG/100ML; MG/100ML; MG/100ML; MG/100ML
INJECTION, SOLUTION INTRAVENOUS CONTINUOUS
Status: DISCONTINUED | OUTPATIENT
Start: 2019-01-01 | End: 2019-01-01

## 2019-01-01 RX ORDER — SODIUM CHLORIDE 9 MG/ML
INJECTION, SOLUTION INTRAVENOUS
Status: DISPENSED
Start: 2019-01-01 | End: 2019-01-01

## 2019-01-01 RX ORDER — DEXTROSE MONOHYDRATE 50 MG/ML
100 INJECTION, SOLUTION INTRAVENOUS PRN
Status: DISCONTINUED | OUTPATIENT
Start: 2019-01-01 | End: 2019-01-01 | Stop reason: HOSPADM

## 2019-01-01 RX ORDER — FLUCONAZOLE 100 MG/1
300 TABLET ORAL DAILY
Status: DISCONTINUED | OUTPATIENT
Start: 2019-01-01 | End: 2019-01-01

## 2019-01-01 RX ORDER — METOPROLOL SUCCINATE 100 MG/1
TABLET, EXTENDED RELEASE ORAL
Refills: 3 | COMMUNITY
Start: 2019-01-01

## 2019-01-01 RX ORDER — ACETAMINOPHEN 325 MG/1
650 TABLET ORAL EVERY 8 HOURS SCHEDULED
Status: DISCONTINUED | OUTPATIENT
Start: 2019-01-01 | End: 2019-01-01 | Stop reason: HOSPADM

## 2019-01-01 RX ORDER — AMIODARONE HYDROCHLORIDE 200 MG/1
200 TABLET ORAL DAILY
Status: DISCONTINUED | OUTPATIENT
Start: 2019-01-01 | End: 2019-01-01

## 2019-01-01 RX ORDER — CHLORHEXIDINE GLUCONATE 4 G/100ML
SOLUTION TOPICAL ONCE
Status: COMPLETED | OUTPATIENT
Start: 2019-01-01 | End: 2019-01-01

## 2019-01-01 RX ORDER — PREDNISONE 10 MG/1
TABLET ORAL
Qty: 45 TABLET | Refills: 0 | Status: SHIPPED | OUTPATIENT
Start: 2019-01-01 | End: 2019-01-01

## 2019-01-01 RX ADMIN — DILTIAZEM HYDROCHLORIDE 10 MG/HR: 5 INJECTION INTRAVENOUS at 13:23

## 2019-01-01 RX ADMIN — IPRATROPIUM BROMIDE AND ALBUTEROL SULFATE 1 AMPULE: .5; 3 SOLUTION RESPIRATORY (INHALATION) at 13:18

## 2019-01-01 RX ADMIN — ONDANSETRON 4 MG: 2 INJECTION INTRAMUSCULAR; INTRAVENOUS at 01:34

## 2019-01-01 RX ADMIN — IPRATROPIUM BROMIDE AND ALBUTEROL SULFATE 1 AMPULE: .5; 3 SOLUTION RESPIRATORY (INHALATION) at 07:35

## 2019-01-01 RX ADMIN — FUROSEMIDE 40 MG: 10 INJECTION, SOLUTION INTRAMUSCULAR; INTRAVENOUS at 08:19

## 2019-01-01 RX ADMIN — ONDANSETRON 4 MG: 4 TABLET, ORALLY DISINTEGRATING ORAL at 10:58

## 2019-01-01 RX ADMIN — HYDROCODONE BITARTRATE AND HOMATROPINE METHYLBROMIDE 5 ML: 5; 1.5 SOLUTION ORAL at 03:47

## 2019-01-01 RX ADMIN — PANTOPRAZOLE SODIUM 40 MG: 40 TABLET, DELAYED RELEASE ORAL at 08:22

## 2019-01-01 RX ADMIN — Medication 10 ML: at 08:44

## 2019-01-01 RX ADMIN — PREDNISONE 20 MG: 10 TABLET ORAL at 10:17

## 2019-01-01 RX ADMIN — ACETAMINOPHEN 650 MG: 325 TABLET ORAL at 09:28

## 2019-01-01 RX ADMIN — GUAIFENESIN 600 MG: 600 TABLET, EXTENDED RELEASE ORAL at 20:35

## 2019-01-01 RX ADMIN — CLINDAMYCIN HYDROCHLORIDE 300 MG: 300 CAPSULE ORAL at 21:42

## 2019-01-01 RX ADMIN — PANTOPRAZOLE SODIUM 40 MG: 40 TABLET, DELAYED RELEASE ORAL at 08:50

## 2019-01-01 RX ADMIN — DILTIAZEM HYDROCHLORIDE 30 MG: 30 TABLET, FILM COATED ORAL at 00:33

## 2019-01-01 RX ADMIN — METOPROLOL SUCCINATE 50 MG: 50 TABLET, EXTENDED RELEASE ORAL at 18:25

## 2019-01-01 RX ADMIN — MORPHINE SULFATE 2 MG: 2 INJECTION, SOLUTION INTRAMUSCULAR; INTRAVENOUS at 06:43

## 2019-01-01 RX ADMIN — METOPROLOL SUCCINATE 100 MG: 100 TABLET, EXTENDED RELEASE ORAL at 21:13

## 2019-01-01 RX ADMIN — Medication 10 ML: at 21:42

## 2019-01-01 RX ADMIN — Medication 10 ML: at 09:31

## 2019-01-01 RX ADMIN — DEXTROSE MONOHYDRATE 150 MG: 50 INJECTION, SOLUTION INTRAVENOUS at 12:23

## 2019-01-01 RX ADMIN — IPRATROPIUM BROMIDE AND ALBUTEROL SULFATE 1 AMPULE: .5; 3 SOLUTION RESPIRATORY (INHALATION) at 07:52

## 2019-01-01 RX ADMIN — Medication 10 ML: at 09:27

## 2019-01-01 RX ADMIN — HYDROCODONE BITARTRATE AND HOMATROPINE METHYLBROMIDE 5 ML: 5; 1.5 SOLUTION ORAL at 03:44

## 2019-01-01 RX ADMIN — MECLIZINE HYDROCHLORIDE 25 MG: 25 TABLET ORAL at 09:37

## 2019-01-01 RX ADMIN — ANAGRELIDE HYDROCHLORIDE 0.5 MG: 0.5 CAPSULE ORAL at 10:18

## 2019-01-01 RX ADMIN — ACETAMINOPHEN 650 MG: 325 TABLET ORAL at 14:45

## 2019-01-01 RX ADMIN — DILTIAZEM HYDROCHLORIDE 30 MG: 30 TABLET, FILM COATED ORAL at 00:06

## 2019-01-01 RX ADMIN — IPRATROPIUM BROMIDE 0.5 MG: 0.5 SOLUTION RESPIRATORY (INHALATION) at 21:21

## 2019-01-01 RX ADMIN — FLUTICASONE PROPIONATE 2 SPRAY: 50 SPRAY, METERED NASAL at 09:29

## 2019-01-01 RX ADMIN — ONDANSETRON 4 MG: 2 INJECTION INTRAMUSCULAR; INTRAVENOUS at 16:05

## 2019-01-01 RX ADMIN — DILTIAZEM HYDROCHLORIDE 30 MG: 30 TABLET, FILM COATED ORAL at 23:40

## 2019-01-01 RX ADMIN — ANAGRELIDE HYDROCHLORIDE 0.5 MG: 0.5 CAPSULE ORAL at 23:16

## 2019-01-01 RX ADMIN — IPRATROPIUM BROMIDE AND ALBUTEROL SULFATE 1 AMPULE: .5; 3 SOLUTION RESPIRATORY (INHALATION) at 12:28

## 2019-01-01 RX ADMIN — Medication 10 ML: at 21:32

## 2019-01-01 RX ADMIN — Medication 10 ML: at 20:35

## 2019-01-01 RX ADMIN — CETIRIZINE HYDROCHLORIDE 10 MG: 10 TABLET, FILM COATED ORAL at 09:43

## 2019-01-01 RX ADMIN — POTASSIUM CHLORIDE 40 MEQ: 750 CAPSULE, EXTENDED RELEASE ORAL at 10:17

## 2019-01-01 RX ADMIN — IPRATROPIUM BROMIDE AND ALBUTEROL SULFATE 1 AMPULE: .5; 3 SOLUTION RESPIRATORY (INHALATION) at 12:49

## 2019-01-01 RX ADMIN — DILTIAZEM HYDROCHLORIDE 10 MG: 5 INJECTION INTRAVENOUS at 05:37

## 2019-01-01 RX ADMIN — ANAGRELIDE HYDROCHLORIDE 0.5 MG: 0.5 CAPSULE ORAL at 22:26

## 2019-01-01 RX ADMIN — IPRATROPIUM BROMIDE AND ALBUTEROL SULFATE 1 AMPULE: .5; 3 SOLUTION RESPIRATORY (INHALATION) at 13:24

## 2019-01-01 RX ADMIN — GUAIFENESIN 600 MG: 600 TABLET, EXTENDED RELEASE ORAL at 09:27

## 2019-01-01 RX ADMIN — Medication 10 ML: at 08:49

## 2019-01-01 RX ADMIN — HYDROCODONE BITARTRATE AND HOMATROPINE METHYLBROMIDE 5 ML: 5; 1.5 SOLUTION ORAL at 20:40

## 2019-01-01 RX ADMIN — PIPERACILLIN AND TAZOBACTAM 3.38 G: 3; .375 INJECTION, POWDER, LYOPHILIZED, FOR SOLUTION INTRAVENOUS at 21:36

## 2019-01-01 RX ADMIN — ANAGRELIDE HYDROCHLORIDE 0.5 MG: 0.5 CAPSULE ORAL at 21:41

## 2019-01-01 RX ADMIN — SODIUM CHLORIDE: 9 INJECTION, SOLUTION INTRAVENOUS at 23:20

## 2019-01-01 RX ADMIN — Medication 10 ML: at 08:34

## 2019-01-01 RX ADMIN — ONDANSETRON 4 MG: 2 INJECTION INTRAMUSCULAR; INTRAVENOUS at 08:57

## 2019-01-01 RX ADMIN — ENOXAPARIN SODIUM 40 MG: 40 INJECTION SUBCUTANEOUS at 08:01

## 2019-01-01 RX ADMIN — MECLIZINE HYDROCHLORIDE 25 MG: 25 TABLET ORAL at 15:29

## 2019-01-01 RX ADMIN — PREDNISONE 40 MG: 20 TABLET ORAL at 08:21

## 2019-01-01 RX ADMIN — METOPROLOL TARTRATE 5 MG: 5 INJECTION INTRAVENOUS at 14:28

## 2019-01-01 RX ADMIN — Medication 10 ML: at 21:39

## 2019-01-01 RX ADMIN — ANAGRELIDE HYDROCHLORIDE 0.5 MG: 0.5 CAPSULE ORAL at 20:35

## 2019-01-01 RX ADMIN — MUPIROCIN: 20 OINTMENT TOPICAL at 10:54

## 2019-01-01 RX ADMIN — IPRATROPIUM BROMIDE AND ALBUTEROL SULFATE 1 AMPULE: .5; 3 SOLUTION RESPIRATORY (INHALATION) at 06:43

## 2019-01-01 RX ADMIN — PREDNISONE 20 MG: 10 TABLET ORAL at 07:50

## 2019-01-01 RX ADMIN — GUAIFENESIN 600 MG: 600 TABLET, EXTENDED RELEASE ORAL at 08:22

## 2019-01-01 RX ADMIN — FLUTICASONE PROPIONATE 2 SPRAY: 50 SPRAY, METERED NASAL at 10:17

## 2019-01-01 RX ADMIN — DILTIAZEM HYDROCHLORIDE 30 MG: 30 TABLET, FILM COATED ORAL at 23:54

## 2019-01-01 RX ADMIN — IOPAMIDOL 100 ML: 755 INJECTION, SOLUTION INTRAVENOUS at 22:09

## 2019-01-01 RX ADMIN — BARIUM SULFATE 50 ML: 0.81 POWDER, FOR SUSPENSION ORAL at 14:10

## 2019-01-01 RX ADMIN — ENOXAPARIN SODIUM 40 MG: 40 INJECTION SUBCUTANEOUS at 08:42

## 2019-01-01 RX ADMIN — ONDANSETRON 4 MG: 2 INJECTION INTRAMUSCULAR; INTRAVENOUS at 11:13

## 2019-01-01 RX ADMIN — IPRATROPIUM BROMIDE AND ALBUTEROL SULFATE 1 AMPULE: .5; 3 SOLUTION RESPIRATORY (INHALATION) at 12:19

## 2019-01-01 RX ADMIN — METOPROLOL TARTRATE 5 MG: 5 INJECTION INTRAVENOUS at 13:52

## 2019-01-01 RX ADMIN — CLINDAMYCIN HYDROCHLORIDE 300 MG: 300 CAPSULE ORAL at 15:09

## 2019-01-01 RX ADMIN — FLUCONAZOLE 150 MG: 100 TABLET ORAL at 08:41

## 2019-01-01 RX ADMIN — MECLIZINE HYDROCHLORIDE 25 MG: 25 TABLET ORAL at 21:42

## 2019-01-01 RX ADMIN — DILTIAZEM HYDROCHLORIDE 30 MG: 30 TABLET, FILM COATED ORAL at 11:36

## 2019-01-01 RX ADMIN — ENOXAPARIN SODIUM 40 MG: 40 INJECTION SUBCUTANEOUS at 09:04

## 2019-01-01 RX ADMIN — POTASSIUM CHLORIDE 40 MEQ: 750 CAPSULE, EXTENDED RELEASE ORAL at 17:32

## 2019-01-01 RX ADMIN — DILTIAZEM HYDROCHLORIDE 10 MG/HR: 5 INJECTION INTRAVENOUS at 09:32

## 2019-01-01 RX ADMIN — DIGOXIN 125 MCG: 250 INJECTION, SOLUTION INTRAMUSCULAR; INTRAVENOUS; PARENTERAL at 06:16

## 2019-01-01 RX ADMIN — Medication 10 ML: at 20:15

## 2019-01-01 RX ADMIN — IPRATROPIUM BROMIDE AND ALBUTEROL SULFATE 1 AMPULE: .5; 3 SOLUTION RESPIRATORY (INHALATION) at 20:02

## 2019-01-01 RX ADMIN — DILTIAZEM HYDROCHLORIDE 30 MG: 30 TABLET, FILM COATED ORAL at 17:00

## 2019-01-01 RX ADMIN — ENOXAPARIN SODIUM 40 MG: 40 INJECTION SUBCUTANEOUS at 08:22

## 2019-01-01 RX ADMIN — ONDANSETRON 4 MG: 2 INJECTION INTRAMUSCULAR; INTRAVENOUS at 21:28

## 2019-01-01 RX ADMIN — IPRATROPIUM BROMIDE AND ALBUTEROL SULFATE 1 AMPULE: .5; 3 SOLUTION RESPIRATORY (INHALATION) at 07:37

## 2019-01-01 RX ADMIN — DILTIAZEM HYDROCHLORIDE 10 MG: 5 INJECTION INTRAVENOUS at 21:28

## 2019-01-01 RX ADMIN — ANAGRELIDE HYDROCHLORIDE 0.5 MG: 0.5 CAPSULE ORAL at 08:02

## 2019-01-01 RX ADMIN — GUAIFENESIN AND DEXTROMETHORPHAN 5 ML: 100; 10 SYRUP ORAL at 13:50

## 2019-01-01 RX ADMIN — DILTIAZEM HYDROCHLORIDE 30 MG: 30 TABLET, FILM COATED ORAL at 11:15

## 2019-01-01 RX ADMIN — WATER 10 ML: 1 INJECTION INTRAMUSCULAR; INTRAVENOUS; SUBCUTANEOUS at 21:40

## 2019-01-01 RX ADMIN — IPRATROPIUM BROMIDE AND ALBUTEROL SULFATE 1 AMPULE: .5; 3 SOLUTION RESPIRATORY (INHALATION) at 19:42

## 2019-01-01 RX ADMIN — CETIRIZINE HYDROCHLORIDE 10 MG: 10 TABLET, FILM COATED ORAL at 10:25

## 2019-01-01 RX ADMIN — Medication 10 ML: at 09:13

## 2019-01-01 RX ADMIN — PIPERACILLIN AND TAZOBACTAM 3.38 G: 3; .375 INJECTION, POWDER, LYOPHILIZED, FOR SOLUTION INTRAVENOUS at 17:24

## 2019-01-01 RX ADMIN — PREDNISONE 20 MG: 10 TABLET ORAL at 11:28

## 2019-01-01 RX ADMIN — IPRATROPIUM BROMIDE AND ALBUTEROL SULFATE 1 AMPULE: .5; 3 SOLUTION RESPIRATORY (INHALATION) at 07:22

## 2019-01-01 RX ADMIN — FLUCONAZOLE 150 MG: 100 TABLET ORAL at 20:15

## 2019-01-01 RX ADMIN — DILTIAZEM HYDROCHLORIDE 30 MG: 30 TABLET, FILM COATED ORAL at 06:48

## 2019-01-01 RX ADMIN — METOPROLOL SUCCINATE 100 MG: 100 TABLET, EXTENDED RELEASE ORAL at 08:02

## 2019-01-01 RX ADMIN — TRAZODONE HYDROCHLORIDE 50 MG: 50 TABLET ORAL at 21:26

## 2019-01-01 RX ADMIN — PREDNISONE 20 MG: 10 TABLET ORAL at 08:31

## 2019-01-01 RX ADMIN — GUAIFENESIN AND DEXTROMETHORPHAN 5 ML: 100; 10 SYRUP ORAL at 07:44

## 2019-01-01 RX ADMIN — IPRATROPIUM BROMIDE AND ALBUTEROL SULFATE 1 AMPULE: .5; 3 SOLUTION RESPIRATORY (INHALATION) at 07:44

## 2019-01-01 RX ADMIN — CIPROFLOXACIN 400 MG: 2 INJECTION, SOLUTION INTRAVENOUS at 08:23

## 2019-01-01 RX ADMIN — SODIUM CHLORIDE: 9 INJECTION, SOLUTION INTRAVENOUS at 05:55

## 2019-01-01 RX ADMIN — DILTIAZEM HYDROCHLORIDE 30 MG: 30 TABLET, FILM COATED ORAL at 11:39

## 2019-01-01 RX ADMIN — METOPROLOL SUCCINATE 100 MG: 100 TABLET, EXTENDED RELEASE ORAL at 08:21

## 2019-01-01 RX ADMIN — ALBUTEROL SULFATE 2.5 MG: 2.5 SOLUTION RESPIRATORY (INHALATION) at 11:24

## 2019-01-01 RX ADMIN — IPRATROPIUM BROMIDE AND ALBUTEROL SULFATE 1 AMPULE: .5; 3 SOLUTION RESPIRATORY (INHALATION) at 19:10

## 2019-01-01 RX ADMIN — PIPERACILLIN AND TAZOBACTAM 3.38 G: 3; .375 INJECTION, POWDER, LYOPHILIZED, FOR SOLUTION INTRAVENOUS at 05:31

## 2019-01-01 RX ADMIN — CIPROFLOXACIN 400 MG: 2 INJECTION, SOLUTION INTRAVENOUS at 09:09

## 2019-01-01 RX ADMIN — PANTOPRAZOLE SODIUM 40 MG: 40 INJECTION, POWDER, LYOPHILIZED, FOR SOLUTION INTRAVENOUS at 08:29

## 2019-01-01 RX ADMIN — METOPROLOL TARTRATE 5 MG: 5 INJECTION INTRAVENOUS at 01:55

## 2019-01-01 RX ADMIN — METOPROLOL TARTRATE 12.5 MG: 25 TABLET, FILM COATED ORAL at 11:59

## 2019-01-01 RX ADMIN — ACETAMINOPHEN 650 MG: 325 TABLET ORAL at 10:18

## 2019-01-01 RX ADMIN — GUAIFENESIN 600 MG: 600 TABLET, EXTENDED RELEASE ORAL at 08:34

## 2019-01-01 RX ADMIN — GUAIFENESIN 600 MG: 600 TABLET, EXTENDED RELEASE ORAL at 10:24

## 2019-01-01 RX ADMIN — DOXYCYCLINE HYCLATE 100 MG: 100 CAPSULE ORAL at 10:17

## 2019-01-01 RX ADMIN — ACETAMINOPHEN 650 MG: 325 TABLET ORAL at 12:47

## 2019-01-01 RX ADMIN — IPRATROPIUM BROMIDE AND ALBUTEROL SULFATE 1 AMPULE: .5; 3 SOLUTION RESPIRATORY (INHALATION) at 07:18

## 2019-01-01 RX ADMIN — IPRATROPIUM BROMIDE AND ALBUTEROL SULFATE 1 AMPULE: .5; 3 SOLUTION RESPIRATORY (INHALATION) at 07:23

## 2019-01-01 RX ADMIN — ENOXAPARIN SODIUM 40 MG: 40 INJECTION SUBCUTANEOUS at 09:43

## 2019-01-01 RX ADMIN — ANAGRELIDE HYDROCHLORIDE 0.5 MG: 0.5 CAPSULE ORAL at 08:50

## 2019-01-01 RX ADMIN — GUAIFENESIN 600 MG: 600 TABLET, EXTENDED RELEASE ORAL at 08:30

## 2019-01-01 RX ADMIN — CLINDAMYCIN HYDROCHLORIDE 300 MG: 300 CAPSULE ORAL at 20:35

## 2019-01-01 RX ADMIN — GUAIFENESIN AND DEXTROMETHORPHAN 5 ML: 100; 10 SYRUP ORAL at 08:39

## 2019-01-01 RX ADMIN — GUAIFENESIN 600 MG: 600 TABLET, EXTENDED RELEASE ORAL at 15:09

## 2019-01-01 RX ADMIN — ACETAMINOPHEN 650 MG: 325 TABLET ORAL at 08:42

## 2019-01-01 RX ADMIN — METOPROLOL TARTRATE 5 MG: 5 INJECTION INTRAVENOUS at 05:00

## 2019-01-01 RX ADMIN — DILTIAZEM HYDROCHLORIDE 30 MG: 30 TABLET, FILM COATED ORAL at 18:15

## 2019-01-01 RX ADMIN — AMIODARONE HYDROCHLORIDE 0.5 MG/MIN: 50 INJECTION, SOLUTION INTRAVENOUS at 18:33

## 2019-01-01 RX ADMIN — METHYLPREDNISOLONE SODIUM SUCCINATE 40 MG: 40 INJECTION, POWDER, FOR SOLUTION INTRAMUSCULAR; INTRAVENOUS at 08:23

## 2019-01-01 RX ADMIN — POTASSIUM CHLORIDE 40 MEQ: 750 CAPSULE, EXTENDED RELEASE ORAL at 08:35

## 2019-01-01 RX ADMIN — METOPROLOL SUCCINATE 100 MG: 100 TABLET, EXTENDED RELEASE ORAL at 08:51

## 2019-01-01 RX ADMIN — CETIRIZINE HYDROCHLORIDE 10 MG: 10 TABLET, FILM COATED ORAL at 08:02

## 2019-01-01 RX ADMIN — METOPROLOL TARTRATE 12.5 MG: 25 TABLET, FILM COATED ORAL at 21:40

## 2019-01-01 RX ADMIN — SODIUM CHLORIDE, POTASSIUM CHLORIDE, SODIUM LACTATE AND CALCIUM CHLORIDE: 600; 310; 30; 20 INJECTION, SOLUTION INTRAVENOUS at 22:28

## 2019-01-01 RX ADMIN — ENOXAPARIN SODIUM 40 MG: 40 INJECTION SUBCUTANEOUS at 08:31

## 2019-01-01 RX ADMIN — METOPROLOL TARTRATE 5 MG: 5 INJECTION INTRAVENOUS at 02:27

## 2019-01-01 RX ADMIN — IPRATROPIUM BROMIDE AND ALBUTEROL SULFATE 1 AMPULE: .5; 3 SOLUTION RESPIRATORY (INHALATION) at 15:44

## 2019-01-01 RX ADMIN — AMIODARONE HYDROCHLORIDE 1 MG/MIN: 50 INJECTION, SOLUTION INTRAVENOUS at 12:39

## 2019-01-01 RX ADMIN — LEVOFLOXACIN 500 MG: 5 INJECTION, SOLUTION INTRAVENOUS at 22:47

## 2019-01-01 RX ADMIN — ACETAMINOPHEN 650 MG: 325 TABLET ORAL at 02:49

## 2019-01-01 RX ADMIN — METOPROLOL TARTRATE 12.5 MG: 25 TABLET, FILM COATED ORAL at 17:10

## 2019-01-01 RX ADMIN — Medication 10 ML: at 10:21

## 2019-01-01 RX ADMIN — IPRATROPIUM BROMIDE AND ALBUTEROL SULFATE 1 AMPULE: .5; 3 SOLUTION RESPIRATORY (INHALATION) at 19:39

## 2019-01-01 RX ADMIN — SODIUM CHLORIDE, POTASSIUM CHLORIDE, SODIUM LACTATE AND CALCIUM CHLORIDE 1428 ML: 600; 310; 30; 20 INJECTION, SOLUTION INTRAVENOUS at 20:50

## 2019-01-01 RX ADMIN — IPRATROPIUM BROMIDE AND ALBUTEROL SULFATE 1 AMPULE: .5; 3 SOLUTION RESPIRATORY (INHALATION) at 13:38

## 2019-01-01 RX ADMIN — LABETALOL 20 MG/4 ML (5 MG/ML) INTRAVENOUS SYRINGE 20 MG: at 06:58

## 2019-01-01 RX ADMIN — IPRATROPIUM BROMIDE AND ALBUTEROL SULFATE 1 AMPULE: .5; 3 SOLUTION RESPIRATORY (INHALATION) at 18:49

## 2019-01-01 RX ADMIN — GUAIFENESIN 600 MG: 600 TABLET, EXTENDED RELEASE ORAL at 20:10

## 2019-01-01 RX ADMIN — TRAZODONE HYDROCHLORIDE 50 MG: 50 TABLET ORAL at 01:14

## 2019-01-01 RX ADMIN — PANTOPRAZOLE SODIUM 40 MG: 40 INJECTION, POWDER, LYOPHILIZED, FOR SOLUTION INTRAVENOUS at 08:34

## 2019-01-01 RX ADMIN — ACETAMINOPHEN 650 MG: 325 TABLET ORAL at 22:11

## 2019-01-01 RX ADMIN — GUAIFENESIN AND DEXTROMETHORPHAN 5 ML: 100; 10 SYRUP ORAL at 05:00

## 2019-01-01 RX ADMIN — DILTIAZEM HYDROCHLORIDE 30 MG: 30 TABLET, FILM COATED ORAL at 06:19

## 2019-01-01 RX ADMIN — DOXYCYCLINE HYCLATE 100 MG: 100 CAPSULE ORAL at 09:43

## 2019-01-01 RX ADMIN — FLUTICASONE PROPIONATE 2 SPRAY: 50 SPRAY, METERED NASAL at 08:34

## 2019-01-01 RX ADMIN — CEFEPIME HYDROCHLORIDE 2 G: 2 INJECTION, POWDER, FOR SOLUTION INTRAVENOUS at 13:10

## 2019-01-01 RX ADMIN — ONDANSETRON 4 MG: 2 INJECTION INTRAMUSCULAR; INTRAVENOUS at 15:05

## 2019-01-01 RX ADMIN — FLUTICASONE PROPIONATE 2 SPRAY: 50 SPRAY, METERED NASAL at 07:58

## 2019-01-01 RX ADMIN — MORPHINE SULFATE 2 MG: 2 INJECTION, SOLUTION INTRAMUSCULAR; INTRAVENOUS at 01:31

## 2019-01-01 RX ADMIN — IPRATROPIUM BROMIDE AND ALBUTEROL SULFATE 1 AMPULE: .5; 3 SOLUTION RESPIRATORY (INHALATION) at 07:36

## 2019-01-01 RX ADMIN — DILTIAZEM HYDROCHLORIDE 120 MG: 120 CAPSULE, COATED, EXTENDED RELEASE ORAL at 09:21

## 2019-01-01 RX ADMIN — Medication 10 ML: at 07:45

## 2019-01-01 RX ADMIN — DILTIAZEM HYDROCHLORIDE 120 MG: 120 CAPSULE, COATED, EXTENDED RELEASE ORAL at 21:39

## 2019-01-01 RX ADMIN — CETIRIZINE HYDROCHLORIDE 10 MG: 10 TABLET, FILM COATED ORAL at 08:50

## 2019-01-01 RX ADMIN — ANAGRELIDE HYDROCHLORIDE 0.5 MG: 0.5 CAPSULE ORAL at 08:22

## 2019-01-01 RX ADMIN — DEXTROSE MONOHYDRATE 150 MG: 50 INJECTION, SOLUTION INTRAVENOUS at 04:50

## 2019-01-01 RX ADMIN — PIPERACILLIN AND TAZOBACTAM 3.38 G: 3; .375 INJECTION, POWDER, LYOPHILIZED, FOR SOLUTION INTRAVENOUS at 05:45

## 2019-01-01 RX ADMIN — MECLIZINE HYDROCHLORIDE 25 MG: 25 TABLET ORAL at 14:45

## 2019-01-01 RX ADMIN — DILTIAZEM HYDROCHLORIDE 10 MG: 5 INJECTION INTRAVENOUS at 04:16

## 2019-01-01 RX ADMIN — SODIUM CHLORIDE: 9 INJECTION, SOLUTION INTRAVENOUS at 08:23

## 2019-01-01 RX ADMIN — IPRATROPIUM BROMIDE AND ALBUTEROL SULFATE 1 AMPULE: .5; 3 SOLUTION RESPIRATORY (INHALATION) at 04:20

## 2019-01-01 RX ADMIN — IPRATROPIUM BROMIDE AND ALBUTEROL SULFATE 1 AMPULE: .5; 3 SOLUTION RESPIRATORY (INHALATION) at 07:14

## 2019-01-01 RX ADMIN — POTASSIUM CHLORIDE 40 MEQ: 750 CAPSULE, EXTENDED RELEASE ORAL at 09:38

## 2019-01-01 RX ADMIN — DILTIAZEM HYDROCHLORIDE 5 MG/HR: 5 INJECTION INTRAVENOUS at 06:31

## 2019-01-01 RX ADMIN — GUAIFENESIN AND DEXTROMETHORPHAN 5 ML: 100; 10 SYRUP ORAL at 16:06

## 2019-01-01 RX ADMIN — ENOXAPARIN SODIUM 40 MG: 40 INJECTION SUBCUTANEOUS at 10:16

## 2019-01-01 RX ADMIN — HYDROCODONE BITARTRATE AND HOMATROPINE METHYLBROMIDE 5 ML: 5; 1.5 SOLUTION ORAL at 08:57

## 2019-01-01 RX ADMIN — METOPROLOL TARTRATE 12.5 MG: 25 TABLET, FILM COATED ORAL at 08:23

## 2019-01-01 RX ADMIN — CIPROFLOXACIN 400 MG: 2 INJECTION, SOLUTION INTRAVENOUS at 21:23

## 2019-01-01 RX ADMIN — GUAIFENESIN 600 MG: 600 TABLET, EXTENDED RELEASE ORAL at 09:43

## 2019-01-01 RX ADMIN — METHYLPREDNISOLONE SODIUM SUCCINATE 125 MG: 125 INJECTION, POWDER, FOR SOLUTION INTRAMUSCULAR; INTRAVENOUS at 17:50

## 2019-01-01 RX ADMIN — SODIUM CHLORIDE: 9 INJECTION, SOLUTION INTRAVENOUS at 12:06

## 2019-01-01 RX ADMIN — GUAIFENESIN AND DEXTROMETHORPHAN 5 ML: 100; 10 SYRUP ORAL at 01:14

## 2019-01-01 RX ADMIN — METOPROLOL SUCCINATE 100 MG: 100 TABLET, EXTENDED RELEASE ORAL at 10:24

## 2019-01-01 RX ADMIN — IPRATROPIUM BROMIDE AND ALBUTEROL SULFATE 1 AMPULE: .5; 3 SOLUTION RESPIRATORY (INHALATION) at 19:02

## 2019-01-01 RX ADMIN — IPRATROPIUM BROMIDE AND ALBUTEROL SULFATE 1 AMPULE: .5; 3 SOLUTION RESPIRATORY (INHALATION) at 20:51

## 2019-01-01 RX ADMIN — METOPROLOL TARTRATE 5 MG: 5 INJECTION INTRAVENOUS at 10:18

## 2019-01-01 RX ADMIN — AMIODARONE HYDROCHLORIDE 200 MG: 200 TABLET ORAL at 08:23

## 2019-01-01 RX ADMIN — MORPHINE SULFATE 2 MG: 2 INJECTION, SOLUTION INTRAMUSCULAR; INTRAVENOUS at 21:11

## 2019-01-01 RX ADMIN — IPRATROPIUM BROMIDE AND ALBUTEROL SULFATE 1 AMPULE: .5; 3 SOLUTION RESPIRATORY (INHALATION) at 07:19

## 2019-01-01 RX ADMIN — DOXYCYCLINE HYCLATE 100 MG: 100 CAPSULE ORAL at 21:39

## 2019-01-01 RX ADMIN — CIPROFLOXACIN 500 MG: 500 TABLET, FILM COATED ORAL at 20:10

## 2019-01-01 RX ADMIN — FLUTICASONE PROPIONATE 2 SPRAY: 50 SPRAY, METERED NASAL at 14:46

## 2019-01-01 RX ADMIN — ANAGRELIDE HYDROCHLORIDE 0.5 MG: 0.5 CAPSULE ORAL at 20:10

## 2019-01-01 RX ADMIN — DILTIAZEM HYDROCHLORIDE 120 MG: 120 CAPSULE, COATED, EXTENDED RELEASE ORAL at 11:25

## 2019-01-01 RX ADMIN — PREDNISONE 40 MG: 20 TABLET ORAL at 08:50

## 2019-01-01 RX ADMIN — DILTIAZEM HYDROCHLORIDE 10 MG: 5 INJECTION INTRAVENOUS at 03:15

## 2019-01-01 RX ADMIN — ORPHENADRINE CITRATE 60 MG: 30 INJECTION INTRAMUSCULAR; INTRAVENOUS at 15:00

## 2019-01-01 RX ADMIN — ANAGRELIDE HYDROCHLORIDE 0.5 MG: 0.5 CAPSULE ORAL at 09:43

## 2019-01-01 RX ADMIN — AMIODARONE HYDROCHLORIDE 200 MG: 200 TABLET ORAL at 08:42

## 2019-01-01 RX ADMIN — CEFEPIME HYDROCHLORIDE 2 G: 2 INJECTION, POWDER, FOR SOLUTION INTRAVENOUS at 12:59

## 2019-01-01 RX ADMIN — PANTOPRAZOLE SODIUM 40 MG: 40 TABLET, DELAYED RELEASE ORAL at 08:02

## 2019-01-01 RX ADMIN — PIPERACILLIN AND TAZOBACTAM 3.38 G: 3; .375 INJECTION, POWDER, LYOPHILIZED, FOR SOLUTION INTRAVENOUS at 12:31

## 2019-01-01 RX ADMIN — FLUTICASONE PROPIONATE 2 SPRAY: 50 SPRAY, METERED NASAL at 08:31

## 2019-01-01 RX ADMIN — ACETAMINOPHEN 650 MG: 325 TABLET ORAL at 14:50

## 2019-01-01 RX ADMIN — DOXYCYCLINE HYCLATE 100 MG: 100 CAPSULE ORAL at 20:11

## 2019-01-01 RX ADMIN — METOPROLOL TARTRATE 12.5 MG: 25 TABLET, FILM COATED ORAL at 21:31

## 2019-01-01 RX ADMIN — ENOXAPARIN SODIUM 40 MG: 40 INJECTION SUBCUTANEOUS at 07:45

## 2019-01-01 RX ADMIN — IPRATROPIUM BROMIDE AND ALBUTEROL SULFATE 1 AMPULE: .5; 3 SOLUTION RESPIRATORY (INHALATION) at 19:49

## 2019-01-01 RX ADMIN — HYDROCODONE BITARTRATE AND HOMATROPINE METHYLBROMIDE 5 ML: 5; 1.5 SOLUTION ORAL at 10:25

## 2019-01-01 RX ADMIN — GUAIFENESIN AND DEXTROMETHORPHAN 10 ML: 100; 10 SYRUP ORAL at 22:56

## 2019-01-01 RX ADMIN — CHLORHEXIDINE GLUCONATE: 213 SOLUTION TOPICAL at 05:56

## 2019-01-01 RX ADMIN — IPRATROPIUM BROMIDE AND ALBUTEROL SULFATE 1 AMPULE: .5; 3 SOLUTION RESPIRATORY (INHALATION) at 20:09

## 2019-01-01 RX ADMIN — Medication 10 ML: at 08:30

## 2019-01-01 RX ADMIN — CIPROFLOXACIN 500 MG: 500 TABLET, FILM COATED ORAL at 11:29

## 2019-01-01 RX ADMIN — ENOXAPARIN SODIUM 40 MG: 40 INJECTION SUBCUTANEOUS at 08:41

## 2019-01-01 RX ADMIN — Medication 10 ML: at 19:59

## 2019-01-01 RX ADMIN — Medication 10 ML: at 22:26

## 2019-01-01 RX ADMIN — CEFEPIME HYDROCHLORIDE 2 G: 2 INJECTION, POWDER, FOR SOLUTION INTRAVENOUS at 23:19

## 2019-01-01 RX ADMIN — METOPROLOL TARTRATE 12.5 MG: 25 TABLET, FILM COATED ORAL at 08:42

## 2019-01-01 RX ADMIN — Medication 10 ML: at 09:30

## 2019-01-01 RX ADMIN — IPRATROPIUM BROMIDE AND ALBUTEROL SULFATE 1 AMPULE: .5; 3 SOLUTION RESPIRATORY (INHALATION) at 17:45

## 2019-01-01 RX ADMIN — GUAIFENESIN AND DEXTROMETHORPHAN 5 ML: 100; 10 SYRUP ORAL at 04:13

## 2019-01-01 RX ADMIN — Medication 10 ML: at 08:22

## 2019-01-01 RX ADMIN — MORPHINE SULFATE 2 MG: 2 INJECTION, SOLUTION INTRAMUSCULAR; INTRAVENOUS at 11:29

## 2019-01-01 RX ADMIN — PREDNISONE 40 MG: 20 TABLET ORAL at 10:24

## 2019-01-01 RX ADMIN — DILTIAZEM HYDROCHLORIDE 120 MG: 120 CAPSULE, COATED, EXTENDED RELEASE ORAL at 13:50

## 2019-01-01 RX ADMIN — GUAIFENESIN AND DEXTROMETHORPHAN 5 ML: 100; 10 SYRUP ORAL at 17:24

## 2019-01-01 RX ADMIN — PIPERACILLIN AND TAZOBACTAM 3.38 G: 3; .375 INJECTION, POWDER, LYOPHILIZED, FOR SOLUTION INTRAVENOUS at 05:53

## 2019-01-01 RX ADMIN — ALBUTEROL SULFATE 2.5 MG: 2.5 SOLUTION RESPIRATORY (INHALATION) at 16:50

## 2019-01-01 RX ADMIN — GUAIFENESIN AND DEXTROMETHORPHAN 10 ML: 100; 10 SYRUP ORAL at 01:36

## 2019-01-01 RX ADMIN — CIPROFLOXACIN 500 MG: 500 TABLET, FILM COATED ORAL at 08:30

## 2019-01-01 RX ADMIN — FLUCONAZOLE 150 MG: 100 TABLET ORAL at 14:49

## 2019-01-01 RX ADMIN — IPRATROPIUM BROMIDE AND ALBUTEROL SULFATE 1 AMPULE: .5; 3 SOLUTION RESPIRATORY (INHALATION) at 14:41

## 2019-01-01 RX ADMIN — METHYLPREDNISOLONE SODIUM SUCCINATE 40 MG: 40 INJECTION, POWDER, FOR SOLUTION INTRAMUSCULAR; INTRAVENOUS at 00:20

## 2019-01-01 RX ADMIN — MAGNESIUM SULFATE HEPTAHYDRATE 2 G: 40 INJECTION, SOLUTION INTRAVENOUS at 17:50

## 2019-01-01 RX ADMIN — Medication 10 ML: at 09:12

## 2019-01-01 RX ADMIN — METOPROLOL SUCCINATE 100 MG: 100 TABLET, EXTENDED RELEASE ORAL at 09:43

## 2019-01-01 RX ADMIN — GUAIFENESIN 600 MG: 600 TABLET, EXTENDED RELEASE ORAL at 19:59

## 2019-01-01 RX ADMIN — IPRATROPIUM BROMIDE AND ALBUTEROL SULFATE 1 AMPULE: .5; 3 SOLUTION RESPIRATORY (INHALATION) at 07:20

## 2019-01-01 RX ADMIN — Medication 10 ML: at 08:43

## 2019-01-01 RX ADMIN — PIPERACILLIN AND TAZOBACTAM 3.38 G: 3; .375 INJECTION, POWDER, LYOPHILIZED, FOR SOLUTION INTRAVENOUS at 01:17

## 2019-01-01 RX ADMIN — FLUCONAZOLE 150 MG: 100 TABLET ORAL at 08:22

## 2019-01-01 RX ADMIN — Medication 10 ML: at 21:31

## 2019-01-01 RX ADMIN — FLUTICASONE PROPIONATE 2 SPRAY: 50 SPRAY, METERED NASAL at 22:28

## 2019-01-01 RX ADMIN — IPRATROPIUM BROMIDE AND ALBUTEROL SULFATE 1 AMPULE: .5; 3 SOLUTION RESPIRATORY (INHALATION) at 14:16

## 2019-01-01 RX ADMIN — GUAIFENESIN AND DEXTROMETHORPHAN 5 ML: 100; 10 SYRUP ORAL at 20:52

## 2019-01-01 RX ADMIN — CIPROFLOXACIN 400 MG: 2 INJECTION, SOLUTION INTRAVENOUS at 20:14

## 2019-01-01 RX ADMIN — DOXYCYCLINE HYCLATE 100 MG: 100 CAPSULE ORAL at 08:36

## 2019-01-01 RX ADMIN — ENOXAPARIN SODIUM 40 MG: 40 INJECTION SUBCUTANEOUS at 08:34

## 2019-01-01 RX ADMIN — ENOXAPARIN SODIUM 40 MG: 40 INJECTION SUBCUTANEOUS at 08:23

## 2019-01-01 RX ADMIN — HYDROCODONE BITARTRATE AND HOMATROPINE METHYLBROMIDE 5 ML: 5; 1.5 SOLUTION ORAL at 19:04

## 2019-01-01 RX ADMIN — LEVOFLOXACIN 500 MG: 500 TABLET, FILM COATED ORAL at 21:42

## 2019-01-01 RX ADMIN — FLUTICASONE PROPIONATE 2 SPRAY: 50 SPRAY, METERED NASAL at 08:36

## 2019-01-01 RX ADMIN — IPRATROPIUM BROMIDE AND ALBUTEROL SULFATE 1 AMPULE: .5; 3 SOLUTION RESPIRATORY (INHALATION) at 20:27

## 2019-01-01 RX ADMIN — METOPROLOL SUCCINATE 100 MG: 100 TABLET, EXTENDED RELEASE ORAL at 08:30

## 2019-01-01 RX ADMIN — IPRATROPIUM BROMIDE AND ALBUTEROL SULFATE 1 AMPULE: .5; 3 SOLUTION RESPIRATORY (INHALATION) at 19:32

## 2019-01-01 RX ADMIN — HYDROCODONE BITARTRATE AND HOMATROPINE METHYLBROMIDE 5 ML: 5; 1.5 SOLUTION ORAL at 19:59

## 2019-01-01 RX ADMIN — PANTOPRAZOLE SODIUM 40 MG: 40 TABLET, DELAYED RELEASE ORAL at 09:43

## 2019-01-01 RX ADMIN — DOXYCYCLINE HYCLATE 100 MG: 100 CAPSULE ORAL at 19:59

## 2019-01-01 RX ADMIN — PREDNISONE 40 MG: 20 TABLET ORAL at 09:43

## 2019-01-01 RX ADMIN — ACETAMINOPHEN 650 MG: 325 TABLET ORAL at 04:54

## 2019-01-01 RX ADMIN — HYDROCODONE BITARTRATE AND HOMATROPINE METHYLBROMIDE 5 ML: 5; 1.5 SOLUTION ORAL at 23:16

## 2019-01-01 RX ADMIN — Medication 10 ML: at 22:28

## 2019-01-01 RX ADMIN — IPRATROPIUM BROMIDE AND ALBUTEROL SULFATE 1 AMPULE: .5; 3 SOLUTION RESPIRATORY (INHALATION) at 13:45

## 2019-01-01 RX ADMIN — METOPROLOL TARTRATE 5 MG: 5 INJECTION INTRAVENOUS at 02:46

## 2019-01-01 RX ADMIN — Medication 10 ML: at 08:23

## 2019-01-01 RX ADMIN — Medication 10 ML: at 21:26

## 2019-01-01 RX ADMIN — Medication 10 ML: at 20:12

## 2019-01-01 RX ADMIN — PIPERACILLIN AND TAZOBACTAM 3.38 G: 3; .375 INJECTION, POWDER, LYOPHILIZED, FOR SOLUTION INTRAVENOUS at 21:31

## 2019-01-01 RX ADMIN — IOPAMIDOL 100 ML: 612 INJECTION, SOLUTION INTRAVENOUS at 15:20

## 2019-01-01 RX ADMIN — FAMOTIDINE 20 MG: 10 INJECTION, SOLUTION INTRAVENOUS at 21:40

## 2019-01-01 RX ADMIN — ANAGRELIDE HYDROCHLORIDE 0.5 MG: 0.5 CAPSULE ORAL at 21:26

## 2019-01-01 RX ADMIN — CLINDAMYCIN HYDROCHLORIDE 300 MG: 300 CAPSULE ORAL at 08:22

## 2019-01-01 RX ADMIN — CEFEPIME HYDROCHLORIDE 2 G: 2 INJECTION, POWDER, FOR SOLUTION INTRAVENOUS at 00:19

## 2019-01-01 RX ADMIN — SODIUM CHLORIDE: 9 INJECTION, SOLUTION INTRAVENOUS at 00:19

## 2019-01-01 RX ADMIN — VANCOMYCIN HYDROCHLORIDE 750 MG: 750 INJECTION, POWDER, LYOPHILIZED, FOR SOLUTION INTRAVENOUS at 10:13

## 2019-01-01 RX ADMIN — POTASSIUM CHLORIDE 40 MEQ: 750 CAPSULE, EXTENDED RELEASE ORAL at 17:41

## 2019-01-01 RX ADMIN — HYDROCODONE BITARTRATE AND HOMATROPINE METHYLBROMIDE 5 ML: 5; 1.5 SOLUTION ORAL at 19:14

## 2019-01-01 RX ADMIN — Medication 10 ML: at 21:37

## 2019-01-01 RX ADMIN — DOXYCYCLINE HYCLATE 100 MG: 100 CAPSULE ORAL at 08:51

## 2019-01-01 RX ADMIN — IPRATROPIUM BROMIDE AND ALBUTEROL SULFATE 1 AMPULE: .5; 3 SOLUTION RESPIRATORY (INHALATION) at 12:43

## 2019-01-01 RX ADMIN — IPRATROPIUM BROMIDE AND ALBUTEROL SULFATE 1 AMPULE: .5; 3 SOLUTION RESPIRATORY (INHALATION) at 19:27

## 2019-01-01 RX ADMIN — METOPROLOL SUCCINATE 100 MG: 100 TABLET, EXTENDED RELEASE ORAL at 09:28

## 2019-01-01 RX ADMIN — DOXYCYCLINE HYCLATE 100 MG: 100 CAPSULE ORAL at 20:51

## 2019-01-01 RX ADMIN — MORPHINE SULFATE 2 MG: 2 INJECTION, SOLUTION INTRAMUSCULAR; INTRAVENOUS at 20:10

## 2019-01-01 RX ADMIN — TRAZODONE HYDROCHLORIDE 50 MG: 50 TABLET ORAL at 21:40

## 2019-01-01 RX ADMIN — IPRATROPIUM BROMIDE AND ALBUTEROL SULFATE 1 AMPULE: .5; 3 SOLUTION RESPIRATORY (INHALATION) at 20:30

## 2019-01-01 RX ADMIN — IPRATROPIUM BROMIDE AND ALBUTEROL SULFATE 1 AMPULE: .5; 3 SOLUTION RESPIRATORY (INHALATION) at 13:36

## 2019-01-01 RX ADMIN — ACETAMINOPHEN 650 MG: 325 TABLET ORAL at 08:29

## 2019-01-01 RX ADMIN — SODIUM CHLORIDE: 9 INJECTION, SOLUTION INTRAVENOUS at 15:12

## 2019-01-01 RX ADMIN — DILTIAZEM HYDROCHLORIDE 30 MG: 30 TABLET, FILM COATED ORAL at 12:07

## 2019-01-01 RX ADMIN — METHYLPREDNISOLONE SODIUM SUCCINATE 40 MG: 40 INJECTION, POWDER, FOR SOLUTION INTRAMUSCULAR; INTRAVENOUS at 08:01

## 2019-01-01 RX ADMIN — DOXYCYCLINE HYCLATE 100 MG: 100 CAPSULE ORAL at 07:44

## 2019-01-01 RX ADMIN — Medication 10 ML: at 20:53

## 2019-01-01 RX ADMIN — DILTIAZEM HYDROCHLORIDE 10 MG/HR: 5 INJECTION INTRAVENOUS at 12:09

## 2019-01-01 RX ADMIN — GUAIFENESIN 600 MG: 600 TABLET, EXTENDED RELEASE ORAL at 22:26

## 2019-01-01 RX ADMIN — ANAGRELIDE HYDROCHLORIDE 0.5 MG: 0.5 CAPSULE ORAL at 11:47

## 2019-01-01 RX ADMIN — DILTIAZEM HYDROCHLORIDE 30 MG: 30 TABLET, FILM COATED ORAL at 06:46

## 2019-01-01 RX ADMIN — ENOXAPARIN SODIUM 40 MG: 40 INJECTION SUBCUTANEOUS at 09:38

## 2019-01-01 RX ADMIN — DILTIAZEM HYDROCHLORIDE 10 MG/HR: 5 INJECTION INTRAVENOUS at 17:41

## 2019-01-01 RX ADMIN — DILTIAZEM HYDROCHLORIDE 5 MG/HR: 5 INJECTION INTRAVENOUS at 11:37

## 2019-01-01 RX ADMIN — Medication 10 ML: at 10:27

## 2019-01-01 RX ADMIN — DOXYCYCLINE HYCLATE 100 MG: 100 CAPSULE ORAL at 22:26

## 2019-01-01 RX ADMIN — CIPROFLOXACIN 400 MG: 2 INJECTION, SOLUTION INTRAVENOUS at 16:52

## 2019-01-01 RX ADMIN — CETIRIZINE HYDROCHLORIDE 10 MG: 10 TABLET, FILM COATED ORAL at 08:22

## 2019-01-01 RX ADMIN — PIPERACILLIN AND TAZOBACTAM 3.38 G: 3; .375 INJECTION, POWDER, LYOPHILIZED, FOR SOLUTION INTRAVENOUS at 05:34

## 2019-01-01 RX ADMIN — DILTIAZEM HYDROCHLORIDE 30 MG: 30 TABLET, FILM COATED ORAL at 06:17

## 2019-01-01 RX ADMIN — FLUTICASONE PROPIONATE 2 SPRAY: 50 SPRAY, METERED NASAL at 07:45

## 2019-01-01 RX ADMIN — KETOROLAC TROMETHAMINE 15 MG: 15 INJECTION, SOLUTION INTRAMUSCULAR; INTRAVENOUS at 18:25

## 2019-01-01 RX ADMIN — METOPROLOL TARTRATE 5 MG: 5 INJECTION INTRAVENOUS at 18:15

## 2019-01-01 RX ADMIN — DOXYCYCLINE HYCLATE 100 MG: 100 CAPSULE ORAL at 10:25

## 2019-01-01 RX ADMIN — ENOXAPARIN SODIUM 40 MG: 40 INJECTION SUBCUTANEOUS at 10:25

## 2019-01-01 RX ADMIN — ANAGRELIDE HYDROCHLORIDE 0.5 MG: 0.5 CAPSULE ORAL at 11:15

## 2019-01-01 RX ADMIN — IPRATROPIUM BROMIDE AND ALBUTEROL SULFATE 1 AMPULE: .5; 3 SOLUTION RESPIRATORY (INHALATION) at 10:07

## 2019-01-01 RX ADMIN — IPRATROPIUM BROMIDE AND ALBUTEROL SULFATE 1 AMPULE: .5; 3 SOLUTION RESPIRATORY (INHALATION) at 13:27

## 2019-01-01 RX ADMIN — DILTIAZEM HYDROCHLORIDE 10 MG/HR: 5 INJECTION INTRAVENOUS at 21:59

## 2019-01-01 RX ADMIN — FLUCONAZOLE 150 MG: 100 TABLET ORAL at 09:20

## 2019-01-01 RX ADMIN — LABETALOL 20 MG/4 ML (5 MG/ML) INTRAVENOUS SYRINGE 10 MG: at 21:51

## 2019-01-01 RX ADMIN — IPRATROPIUM BROMIDE AND ALBUTEROL SULFATE 1 AMPULE: .5; 3 SOLUTION RESPIRATORY (INHALATION) at 05:09

## 2019-01-01 RX ADMIN — HYDROCODONE BITARTRATE AND HOMATROPINE METHYLBROMIDE 5 ML: 5; 1.5 SOLUTION ORAL at 22:26

## 2019-01-01 RX ADMIN — DILTIAZEM HYDROCHLORIDE 30 MG: 30 TABLET, FILM COATED ORAL at 17:32

## 2019-01-01 RX ADMIN — IPRATROPIUM BROMIDE AND ALBUTEROL SULFATE 1 AMPULE: .5; 3 SOLUTION RESPIRATORY (INHALATION) at 19:50

## 2019-01-01 RX ADMIN — CLINDAMYCIN HYDROCHLORIDE 300 MG: 300 CAPSULE ORAL at 14:48

## 2019-01-01 RX ADMIN — Medication 10 ML: at 08:32

## 2019-01-01 RX ADMIN — IPRATROPIUM BROMIDE AND ALBUTEROL SULFATE 1 AMPULE: .5; 3 SOLUTION RESPIRATORY (INHALATION) at 17:47

## 2019-01-01 RX ADMIN — POTASSIUM CHLORIDE 40 MEQ: 750 CAPSULE, EXTENDED RELEASE ORAL at 18:14

## 2019-01-01 RX ADMIN — ACETAMINOPHEN 650 MG: 325 TABLET ORAL at 14:55

## 2019-01-01 RX ADMIN — ALBUTEROL SULFATE 5 MG: 2.5 SOLUTION RESPIRATORY (INHALATION) at 21:20

## 2019-01-01 RX ADMIN — GUAIFENESIN 600 MG: 600 TABLET, EXTENDED RELEASE ORAL at 22:28

## 2019-01-01 RX ADMIN — IPRATROPIUM BROMIDE AND ALBUTEROL SULFATE 1 AMPULE: .5; 3 SOLUTION RESPIRATORY (INHALATION) at 12:39

## 2019-01-01 RX ADMIN — DILTIAZEM HYDROCHLORIDE 5 MG/HR: 5 INJECTION INTRAVENOUS at 21:56

## 2019-01-01 RX ADMIN — ENOXAPARIN SODIUM 40 MG: 40 INJECTION SUBCUTANEOUS at 08:50

## 2019-01-01 RX ADMIN — AMIODARONE HYDROCHLORIDE 200 MG: 200 TABLET ORAL at 12:47

## 2019-01-01 RX ADMIN — HYDROCORTISONE SODIUM SUCCINATE 100 MG: 100 INJECTION, POWDER, FOR SOLUTION INTRAMUSCULAR; INTRAVENOUS at 21:40

## 2019-01-01 RX ADMIN — ACETAMINOPHEN 650 MG: 325 TABLET ORAL at 17:33

## 2019-01-01 RX ADMIN — DILTIAZEM HYDROCHLORIDE 30 MG: 30 TABLET, FILM COATED ORAL at 17:41

## 2019-01-01 RX ADMIN — ACETAMINOPHEN 650 MG: 325 TABLET ORAL at 15:08

## 2019-01-01 RX ADMIN — DOXYCYCLINE HYCLATE 100 MG: 100 CAPSULE ORAL at 21:32

## 2019-01-01 RX ADMIN — ACETAMINOPHEN 650 MG: 325 TABLET ORAL at 22:52

## 2019-01-01 RX ADMIN — FLUTICASONE PROPIONATE 2 SPRAY: 50 SPRAY, METERED NASAL at 11:57

## 2019-01-01 RX ADMIN — ANAGRELIDE HYDROCHLORIDE 0.5 MG: 0.5 CAPSULE ORAL at 19:59

## 2019-01-01 RX ADMIN — ANAGRELIDE HYDROCHLORIDE 0.5 MG: 0.5 CAPSULE ORAL at 10:25

## 2019-01-01 RX ADMIN — GUAIFENESIN 600 MG: 600 TABLET, EXTENDED RELEASE ORAL at 08:50

## 2019-01-01 RX ADMIN — Medication 10 ML: at 08:01

## 2019-01-01 RX ADMIN — GUAIFENESIN 600 MG: 600 TABLET, EXTENDED RELEASE ORAL at 21:41

## 2019-01-01 RX ADMIN — Medication 10 ML: at 22:47

## 2019-01-01 RX ADMIN — DILTIAZEM HYDROCHLORIDE 30 MG: 30 TABLET, FILM COATED ORAL at 05:55

## 2019-01-01 RX ADMIN — ACETAMINOPHEN 650 MG: 325 TABLET ORAL at 19:52

## 2019-01-01 RX ADMIN — ACETAMINOPHEN 650 MG: 325 TABLET ORAL at 21:41

## 2019-01-01 RX ADMIN — PIPERACILLIN AND TAZOBACTAM 3.38 G: 3; .375 INJECTION, POWDER, LYOPHILIZED, FOR SOLUTION INTRAVENOUS at 12:52

## 2019-01-01 RX ADMIN — ACETAMINOPHEN 650 MG: 325 TABLET ORAL at 15:29

## 2019-01-01 RX ADMIN — FLUTICASONE PROPIONATE 2 SPRAY: 50 SPRAY, METERED NASAL at 09:32

## 2019-01-01 RX ADMIN — METOPROLOL TARTRATE 12.5 MG: 25 TABLET, FILM COATED ORAL at 13:41

## 2019-01-01 RX ADMIN — ANAGRELIDE HYDROCHLORIDE 0.5 MG: 0.5 CAPSULE ORAL at 09:38

## 2019-01-01 RX ADMIN — ACETAMINOPHEN 650 MG: 325 TABLET ORAL at 20:29

## 2019-01-01 RX ADMIN — AMIODARONE HYDROCHLORIDE 0.5 MG/MIN: 50 INJECTION, SOLUTION INTRAVENOUS at 20:05

## 2019-01-01 RX ADMIN — PREDNISONE 20 MG: 10 TABLET ORAL at 08:35

## 2019-01-01 RX ADMIN — MORPHINE SULFATE 4 MG: 2 INJECTION, SOLUTION INTRAMUSCULAR; INTRAVENOUS at 16:05

## 2019-01-01 RX ADMIN — PIPERACILLIN AND TAZOBACTAM 3.38 G: 3; .375 INJECTION, POWDER, LYOPHILIZED, FOR SOLUTION INTRAVENOUS at 21:25

## 2019-01-01 RX ADMIN — CLINDAMYCIN HYDROCHLORIDE 300 MG: 300 CAPSULE ORAL at 08:02

## 2019-01-01 RX ADMIN — DILTIAZEM HYDROCHLORIDE 30 MG: 30 TABLET, FILM COATED ORAL at 12:29

## 2019-01-01 RX ADMIN — VANCOMYCIN HYDROCHLORIDE 1000 MG: 1 INJECTION, POWDER, LYOPHILIZED, FOR SOLUTION INTRAVENOUS at 13:13

## 2019-01-01 RX ADMIN — METOPROLOL TARTRATE 12.5 MG: 25 TABLET, FILM COATED ORAL at 12:31

## 2019-01-01 RX ADMIN — FLUCONAZOLE 150 MG: 100 TABLET ORAL at 08:42

## 2019-01-01 RX ADMIN — ANAGRELIDE HYDROCHLORIDE 0.5 MG: 0.5 CAPSULE ORAL at 21:42

## 2019-01-01 RX ADMIN — PIPERACILLIN AND TAZOBACTAM 3.38 G: 3; .375 INJECTION, POWDER, LYOPHILIZED, FOR SOLUTION INTRAVENOUS at 21:39

## 2019-01-01 RX ADMIN — VANCOMYCIN HYDROCHLORIDE 1250 MG: 5 INJECTION, POWDER, LYOPHILIZED, FOR SOLUTION INTRAVENOUS at 22:20

## 2019-01-01 RX ADMIN — DILTIAZEM HYDROCHLORIDE 120 MG: 120 CAPSULE, COATED, EXTENDED RELEASE ORAL at 21:26

## 2019-01-01 RX ADMIN — PIPERACILLIN AND TAZOBACTAM 3.38 G: 3; .375 INJECTION, POWDER, LYOPHILIZED, FOR SOLUTION INTRAVENOUS at 13:00

## 2019-01-01 RX ADMIN — SODIUM CHLORIDE 1000 ML: 9 INJECTION, SOLUTION INTRAVENOUS at 15:00

## 2019-01-01 RX ADMIN — PANTOPRAZOLE SODIUM 40 MG: 40 TABLET, DELAYED RELEASE ORAL at 10:25

## 2019-01-01 RX ADMIN — GUAIFENESIN AND DEXTROMETHORPHAN 5 ML: 100; 10 SYRUP ORAL at 08:41

## 2019-01-01 RX ADMIN — SODIUM CHLORIDE 250 ML: 9 INJECTION, SOLUTION INTRAVENOUS at 09:22

## 2019-01-01 RX ADMIN — METOPROLOL TARTRATE 5 MG: 5 INJECTION INTRAVENOUS at 21:56

## 2019-01-01 RX ADMIN — ACETAMINOPHEN 650 MG: 325 TABLET ORAL at 21:26

## 2019-01-01 RX ADMIN — GUAIFENESIN 600 MG: 600 TABLET, EXTENDED RELEASE ORAL at 21:42

## 2019-01-01 RX ADMIN — ONDANSETRON 4 MG: 2 INJECTION INTRAMUSCULAR; INTRAVENOUS at 22:26

## 2019-01-01 RX ADMIN — ONDANSETRON 4 MG: 2 INJECTION INTRAMUSCULAR; INTRAVENOUS at 15:33

## 2019-01-01 RX ADMIN — PANTOPRAZOLE SODIUM 40 MG: 40 INJECTION, POWDER, LYOPHILIZED, FOR SOLUTION INTRAVENOUS at 09:29

## 2019-01-01 RX ADMIN — FUROSEMIDE 40 MG: 10 INJECTION, SOLUTION INTRAVENOUS at 07:00

## 2019-01-01 RX ADMIN — METOPROLOL TARTRATE 5 MG: 5 INJECTION INTRAVENOUS at 14:12

## 2019-01-01 RX ADMIN — Medication 10 ML: at 21:41

## 2019-01-01 RX ADMIN — ACETAMINOPHEN 650 MG: 325 TABLET ORAL at 14:02

## 2019-01-01 RX ADMIN — METOPROLOL TARTRATE 12.5 MG: 25 TABLET, FILM COATED ORAL at 18:26

## 2019-01-01 RX ADMIN — DOXYCYCLINE HYCLATE 100 MG: 100 CAPSULE ORAL at 11:25

## 2019-01-01 RX ADMIN — ANAGRELIDE HYDROCHLORIDE 0.5 MG: 0.5 CAPSULE ORAL at 20:46

## 2019-01-01 RX ADMIN — HYDROCODONE BITARTRATE AND HOMATROPINE METHYLBROMIDE 5 ML: 5; 1.5 SOLUTION ORAL at 15:06

## 2019-01-01 RX ADMIN — HYDROCODONE BITARTRATE AND HOMATROPINE METHYLBROMIDE 5 ML: 5; 1.5 SOLUTION ORAL at 08:01

## 2019-01-01 RX ADMIN — OXYCODONE HYDROCHLORIDE AND ACETAMINOPHEN 1 TABLET: 5; 325 TABLET ORAL at 10:58

## 2019-01-01 RX ADMIN — Medication 10 ML: at 10:17

## 2019-01-01 ASSESSMENT — PAIN SCALES - GENERAL
PAINLEVEL_OUTOF10: 9
PAINLEVEL_OUTOF10: 5
PAINLEVEL_OUTOF10: 0
PAINLEVEL_OUTOF10: 0
PAINLEVEL_OUTOF10: 6
PAINLEVEL_OUTOF10: 0
PAINLEVEL_OUTOF10: 6
PAINLEVEL_OUTOF10: 0
PAINLEVEL_OUTOF10: 8
PAINLEVEL_OUTOF10: 4
PAINLEVEL_OUTOF10: 0
PAINLEVEL_OUTOF10: 0
PAINLEVEL_OUTOF10: 8
PAINLEVEL_OUTOF10: 0
PAINLEVEL_OUTOF10: 8
PAINLEVEL_OUTOF10: 8
PAINLEVEL_OUTOF10: 0
PAINLEVEL_OUTOF10: 7
PAINLEVEL_OUTOF10: 0
PAINLEVEL_OUTOF10: 1
PAINLEVEL_OUTOF10: 5
PAINLEVEL_OUTOF10: 8
PAINLEVEL_OUTOF10: 6
PAINLEVEL_OUTOF10: 0
PAINLEVEL_OUTOF10: 3
PAINLEVEL_OUTOF10: 0
PAINLEVEL_OUTOF10: 6
PAINLEVEL_OUTOF10: 7
PAINLEVEL_OUTOF10: 0
PAINLEVEL_OUTOF10: 9
PAINLEVEL_OUTOF10: 0
PAINLEVEL_OUTOF10: 8
PAINLEVEL_OUTOF10: 0
PAINLEVEL_OUTOF10: 0
PAINLEVEL_OUTOF10: 5
PAINLEVEL_OUTOF10: 8
PAINLEVEL_OUTOF10: 5
PAINLEVEL_OUTOF10: 0
PAINLEVEL_OUTOF10: 6
PAINLEVEL_OUTOF10: 5
PAINLEVEL_OUTOF10: 0
PAINLEVEL_OUTOF10: 0
PAINLEVEL_OUTOF10: 8
PAINLEVEL_OUTOF10: 0
PAINLEVEL_OUTOF10: 2
PAINLEVEL_OUTOF10: 0
PAINLEVEL_OUTOF10: 10
PAINLEVEL_OUTOF10: 0
PAINLEVEL_OUTOF10: 9
PAINLEVEL_OUTOF10: 0
PAINLEVEL_OUTOF10: 6
PAINLEVEL_OUTOF10: 0
PAINLEVEL_OUTOF10: 4
PAINLEVEL_OUTOF10: 0
PAINLEVEL_OUTOF10: 5
PAINLEVEL_OUTOF10: 0
PAINLEVEL_OUTOF10: 0
PAINLEVEL_OUTOF10: 9
PAINLEVEL_OUTOF10: 0
PAINLEVEL_OUTOF10: 8
PAINLEVEL_OUTOF10: 4
PAINLEVEL_OUTOF10: 0
PAINLEVEL_OUTOF10: 0
PAINLEVEL_OUTOF10: 6
PAINLEVEL_OUTOF10: 3
PAINLEVEL_OUTOF10: 0
PAINLEVEL_OUTOF10: 9
PAINLEVEL_OUTOF10: 0
PAINLEVEL_OUTOF10: 3
PAINLEVEL_OUTOF10: 8
PAINLEVEL_OUTOF10: 0
PAINLEVEL_OUTOF10: 0
PAINLEVEL_OUTOF10: 5
PAINLEVEL_OUTOF10: 0
PAINLEVEL_OUTOF10: 0
PAINLEVEL_OUTOF10: 7

## 2019-01-01 ASSESSMENT — ENCOUNTER SYMPTOMS
ABDOMINAL PAIN: 1
SHORTNESS OF BREATH: 0
NAUSEA: 1
DIARRHEA: 0
VOMITING: 0
ABDOMINAL PAIN: 0
WHEEZING: 1
DIARRHEA: 0
WHEEZING: 0
SHORTNESS OF BREATH: 0
COUGH: 1
CHOKING: 0
SHORTNESS OF BREATH: 1
SHORTNESS OF BREATH: 0
FACIAL SWELLING: 0
SWOLLEN GLANDS: 0
EYE ITCHING: 0
RHINORRHEA: 1
ALLERGIC/IMMUNOLOGIC NEGATIVE: 1
ANAL BLEEDING: 0
COUGH: 1
VOMITING: 0
GASTROINTESTINAL NEGATIVE: 1
BACK PAIN: 0
DIARRHEA: 0
EYE REDNESS: 0
VOMITING: 0
WHEEZING: 0
VOMITING: 0
ABDOMINAL PAIN: 0
NAUSEA: 0
SINUS PRESSURE: 0
SORE THROAT: 0
CONSTIPATION: 0
CHEST TIGHTNESS: 0
RHINORRHEA: 1
PHOTOPHOBIA: 0
EYE DISCHARGE: 0
SINUS PRESSURE: 1
WHEEZING: 0
NAUSEA: 0
TROUBLE SWALLOWING: 0
BACK PAIN: 0
TROUBLE SWALLOWING: 0
CHEST TIGHTNESS: 0
SHORTNESS OF BREATH: 1
ABDOMINAL PAIN: 0
CHEST TIGHTNESS: 0
COUGH: 1
EYE PAIN: 0
DIARRHEA: 0
SINUS COMPLAINT: 1
COUGH: 0
SORE THROAT: 0
EYE REDNESS: 0
COUGH: 0
DIARRHEA: 0
NAUSEA: 0
ABDOMINAL PAIN: 0
NAUSEA: 0
COUGH: 1
STRIDOR: 0
EYE DISCHARGE: 0
CONSTIPATION: 0
SHORTNESS OF BREATH: 1
ABDOMINAL PAIN: 0
RHINORRHEA: 0
TROUBLE SWALLOWING: 0
SHORTNESS OF BREATH: 0
VOICE CHANGE: 0
RHINORRHEA: 1
SORE THROAT: 1
EYE ITCHING: 0
BACK PAIN: 1
RHINORRHEA: 0
SHORTNESS OF BREATH: 0
COUGH: 1
ABDOMINAL DISTENTION: 0
BLOOD IN STOOL: 0
SHORTNESS OF BREATH: 0
NAUSEA: 0
SHORTNESS OF BREATH: 1
NAUSEA: 0
SINUS PAIN: 1
SINUS PRESSURE: 0
SHORTNESS OF BREATH: 0
VOMITING: 0
COLOR CHANGE: 0
EYE ITCHING: 0
SORE THROAT: 0
SINUS PAIN: 1
EYE PAIN: 0
ABDOMINAL PAIN: 1
GASTROINTESTINAL NEGATIVE: 1
SINUS PRESSURE: 1
CHOKING: 0
COUGH: 1
NAUSEA: 0
SINUS PAIN: 1
VOICE CHANGE: 0
COUGH: 1
NAUSEA: 1
SORE THROAT: 0
COUGH: 1
SORE THROAT: 0
EYES NEGATIVE: 1
EYE DISCHARGE: 0
SHORTNESS OF BREATH: 1
VOMITING: 1
ABDOMINAL PAIN: 0
PHOTOPHOBIA: 0
VOMITING: 0

## 2019-01-01 ASSESSMENT — PAIN SCALES - WONG BAKER

## 2019-01-01 ASSESSMENT — PAIN DESCRIPTION - PROGRESSION

## 2019-01-01 ASSESSMENT — PAIN DESCRIPTION - LOCATION
LOCATION: ABDOMEN;BACK
LOCATION: KNEE
LOCATION: ABDOMEN
LOCATION: CHEST
LOCATION: EAR
LOCATION: GENERALIZED
LOCATION: GENERALIZED
LOCATION: FACE
LOCATION: GROIN
LOCATION: EAR
LOCATION: EAR
LOCATION: GENERALIZED
LOCATION: EAR
LOCATION: FACE
LOCATION: ABDOMEN
LOCATION: ABDOMEN

## 2019-01-01 ASSESSMENT — PATIENT HEALTH QUESTIONNAIRE - PHQ9
SUM OF ALL RESPONSES TO PHQ QUESTIONS 1-9: 0
SUM OF ALL RESPONSES TO PHQ QUESTIONS 1-9: 0
2. FEELING DOWN, DEPRESSED OR HOPELESS: 0
SUM OF ALL RESPONSES TO PHQ9 QUESTIONS 1 & 2: 0
1. LITTLE INTEREST OR PLEASURE IN DOING THINGS: 0

## 2019-01-01 ASSESSMENT — PAIN DESCRIPTION - PAIN TYPE
TYPE: CHRONIC PAIN
TYPE: SURGICAL PAIN
TYPE: ACUTE PAIN
TYPE: CHRONIC PAIN
TYPE: ACUTE PAIN
TYPE: CHRONIC PAIN
TYPE: ACUTE PAIN
TYPE: ACUTE PAIN
TYPE: CHRONIC PAIN
TYPE: ACUTE PAIN
TYPE: SURGICAL PAIN
TYPE: ACUTE PAIN
TYPE: CHRONIC PAIN
TYPE: CHRONIC PAIN
TYPE: ACUTE PAIN
TYPE: ACUTE PAIN

## 2019-01-01 ASSESSMENT — PAIN DESCRIPTION - DESCRIPTORS
DESCRIPTORS: SHARP
DESCRIPTORS: JABBING;BURNING
DESCRIPTORS: ACHING
DESCRIPTORS: THROBBING

## 2019-01-01 ASSESSMENT — PAIN DESCRIPTION - ORIENTATION
ORIENTATION: RIGHT
ORIENTATION: LEFT
ORIENTATION: RIGHT

## 2019-01-01 ASSESSMENT — PAIN DESCRIPTION - FREQUENCY
FREQUENCY: CONTINUOUS
FREQUENCY: CONTINUOUS

## 2019-01-01 ASSESSMENT — PAIN DESCRIPTION - ONSET
ONSET: ON-GOING
ONSET: GRADUAL

## 2019-01-01 ASSESSMENT — PAIN - FUNCTIONAL ASSESSMENT: PAIN_FUNCTIONAL_ASSESSMENT: ACTIVITIES ARE NOT PREVENTED

## 2019-01-24 ENCOUNTER — OFFICE VISIT (OUTPATIENT)
Dept: FAMILY MEDICINE CLINIC | Age: 69
End: 2019-01-24
Payer: MEDICARE

## 2019-01-24 VITALS
OXYGEN SATURATION: 96 % | DIASTOLIC BLOOD PRESSURE: 70 MMHG | BODY MASS INDEX: 21.95 KG/M2 | SYSTOLIC BLOOD PRESSURE: 130 MMHG | HEIGHT: 60 IN | WEIGHT: 111.8 LBS | HEART RATE: 90 BPM

## 2019-01-24 DIAGNOSIS — Z13.220 SCREENING, LIPID: ICD-10-CM

## 2019-01-24 DIAGNOSIS — N18.30 CHRONIC RENAL FAILURE, STAGE 3 (MODERATE) (HCC): ICD-10-CM

## 2019-01-24 DIAGNOSIS — J32.9 RECURRENT SINUSITIS: ICD-10-CM

## 2019-01-24 DIAGNOSIS — R09.82 POST-NASAL DRIP: ICD-10-CM

## 2019-01-24 DIAGNOSIS — I48.91 ATRIAL FIBRILLATION, UNSPECIFIED TYPE (HCC): ICD-10-CM

## 2019-01-24 DIAGNOSIS — J44.1 COPD WITH EXACERBATION (HCC): ICD-10-CM

## 2019-01-24 DIAGNOSIS — D64.9 CHRONIC ANEMIA: ICD-10-CM

## 2019-01-24 DIAGNOSIS — D75.839 THROMBOCYTOSIS: ICD-10-CM

## 2019-01-24 DIAGNOSIS — I34.1 MVP (MITRAL VALVE PROLAPSE): ICD-10-CM

## 2019-01-24 DIAGNOSIS — J44.9 CHRONIC OBSTRUCTIVE PULMONARY DISEASE, UNSPECIFIED COPD TYPE (HCC): Primary | ICD-10-CM

## 2019-01-24 LAB
ALBUMIN SERPL-MCNC: 4.4 G/DL (ref 3.9–4.9)
ALP BLD-CCNC: 63 U/L (ref 40–130)
ALT SERPL-CCNC: 8 U/L (ref 0–33)
ANION GAP SERPL CALCULATED.3IONS-SCNC: 11 MEQ/L (ref 7–13)
AST SERPL-CCNC: 15 U/L (ref 0–35)
BILIRUB SERPL-MCNC: 0.3 MG/DL (ref 0–1.2)
BUN BLDV-MCNC: 20 MG/DL (ref 8–23)
CALCIUM SERPL-MCNC: 9.6 MG/DL (ref 8.6–10.2)
CHLORIDE BLD-SCNC: 97 MEQ/L (ref 98–107)
CHOLESTEROL, TOTAL: 225 MG/DL (ref 0–199)
CO2: 26 MEQ/L (ref 22–29)
CREAT SERPL-MCNC: 0.65 MG/DL (ref 0.5–0.9)
GFR AFRICAN AMERICAN: >60
GFR NON-AFRICAN AMERICAN: >60
GLOBULIN: 2.7 G/DL (ref 2.3–3.5)
GLUCOSE BLD-MCNC: 102 MG/DL (ref 74–109)
HCT VFR BLD CALC: 38.4 % (ref 37–47)
HDLC SERPL-MCNC: 54 MG/DL (ref 40–59)
HEMOGLOBIN: 12.9 G/DL (ref 12–16)
LDL CHOLESTEROL CALCULATED: 141 MG/DL (ref 0–129)
MCH RBC QN AUTO: 27.4 PG (ref 27–31.3)
MCHC RBC AUTO-ENTMCNC: 33.5 % (ref 33–37)
MCV RBC AUTO: 81.6 FL (ref 82–100)
PDW BLD-RTO: 14 % (ref 11.5–14.5)
PLATELET # BLD: 215 K/UL (ref 130–400)
POTASSIUM SERPL-SCNC: 4.7 MEQ/L (ref 3.5–5.1)
RBC # BLD: 4.71 M/UL (ref 4.2–5.4)
SODIUM BLD-SCNC: 134 MEQ/L (ref 132–144)
TOTAL PROTEIN: 7.1 G/DL (ref 6.4–8.1)
TRIGL SERPL-MCNC: 152 MG/DL (ref 0–200)
TSH SERPL DL<=0.05 MIU/L-ACNC: 1.36 UIU/ML (ref 0.27–4.2)
WBC # BLD: 9.4 K/UL (ref 4.8–10.8)

## 2019-01-24 PROCEDURE — 99213 OFFICE O/P EST LOW 20 MIN: CPT | Performed by: FAMILY MEDICINE

## 2019-01-24 RX ORDER — DOXYCYCLINE HYCLATE 100 MG
100 TABLET ORAL 2 TIMES DAILY
Qty: 20 TABLET | Refills: 0 | Status: SHIPPED | OUTPATIENT
Start: 2019-01-24 | End: 2019-02-03

## 2019-02-12 ENCOUNTER — OFFICE VISIT (OUTPATIENT)
Dept: PULMONOLOGY | Age: 69
End: 2019-02-12
Payer: MEDICARE

## 2019-02-12 VITALS
WEIGHT: 112 LBS | OXYGEN SATURATION: 98 % | TEMPERATURE: 98.7 F | RESPIRATION RATE: 16 BRPM | SYSTOLIC BLOOD PRESSURE: 122 MMHG | HEART RATE: 72 BPM | HEIGHT: 60 IN | BODY MASS INDEX: 21.99 KG/M2 | DIASTOLIC BLOOD PRESSURE: 64 MMHG

## 2019-02-12 DIAGNOSIS — J20.9 BRONCHITIS, ACUTE, WITH BRONCHOSPASM: ICD-10-CM

## 2019-02-12 DIAGNOSIS — J32.9 RECURRENT SINUSITIS: ICD-10-CM

## 2019-02-12 DIAGNOSIS — J44.9 CHRONIC OBSTRUCTIVE PULMONARY DISEASE, UNSPECIFIED COPD TYPE (HCC): Primary | ICD-10-CM

## 2019-02-12 DIAGNOSIS — J06.9 URI WITH COUGH AND CONGESTION: ICD-10-CM

## 2019-02-12 PROCEDURE — 99213 OFFICE O/P EST LOW 20 MIN: CPT | Performed by: INTERNAL MEDICINE

## 2019-02-12 RX ORDER — ALBUTEROL SULFATE 90 UG/1
2 AEROSOL, METERED RESPIRATORY (INHALATION) EVERY 6 HOURS PRN
Qty: 1 INHALER | Refills: 3 | Status: SHIPPED | OUTPATIENT
Start: 2019-02-12 | End: 2020-01-01

## 2019-02-12 ASSESSMENT — ENCOUNTER SYMPTOMS
VOICE CHANGE: 0
ABDOMINAL PAIN: 0
EYE ITCHING: 0
EYE DISCHARGE: 0
SHORTNESS OF BREATH: 0
SINUS PAIN: 1
CHEST TIGHTNESS: 0
RHINORRHEA: 0
TROUBLE SWALLOWING: 0
DIARRHEA: 0
SINUS PRESSURE: 0
VOMITING: 0
COUGH: 0
SORE THROAT: 0
WHEEZING: 0
NAUSEA: 0

## 2019-03-15 RX ORDER — ALENDRONATE SODIUM 70 MG/1
TABLET ORAL
Qty: 4 TABLET | Refills: 10 | Status: SHIPPED | OUTPATIENT
Start: 2019-03-15 | End: 2020-01-01

## 2019-04-01 RX ORDER — PANTOPRAZOLE SODIUM 40 MG/1
TABLET, DELAYED RELEASE ORAL
Qty: 30 TABLET | Refills: 3 | Status: SHIPPED | OUTPATIENT
Start: 2019-04-01 | End: 2019-01-01 | Stop reason: SDUPTHER

## 2019-05-04 NOTE — PROGRESS NOTES
Subjective  Desean Kate, 78 y.o. female presents today with:  Chief Complaint   Patient presents with    Cough     x 1 week. cough with green phlegm. back pain. not taking anything for relief.  Congestion     x 1 week. Congestion with green phlegm, post nasal drainage. Sore throat at times. denies ear pain. Cough   This is a new problem. Episode onset: x1 week. The problem has been unchanged. The problem occurs constantly. Cough characteristics: dry with occasional sputum. Associated symptoms include chills, nasal congestion, postnasal drip, rhinorrhea, a sore throat (raw) and shortness of breath. Pertinent negatives include no chest pain, fever, headaches or wheezing. The symptoms are aggravated by lying down. Risk factors for lung disease include animal exposure. She has tried nothing for the symptoms. The treatment provided no relief. Her past medical history is significant for bronchitis and COPD. Review of Systems   Constitutional: Positive for chills and fatigue. Negative for diaphoresis and fever. HENT: Positive for congestion, postnasal drip, rhinorrhea, sinus pain (maxillary) and sore throat (raw). Respiratory: Positive for cough and shortness of breath. Negative for wheezing. Cardiovascular: Negative for chest pain and palpitations. Gastrointestinal: Negative for abdominal pain, constipation, diarrhea and nausea. Neurological: Negative for dizziness, light-headedness and headaches. Past Medical History:   Diagnosis Date    Atrial fibrillation (HCC)     Chronic anemia     Chronic rhinitis     DR. NAPIER    Chronic sinusitis     Colon polyp 06/03/2015    DR Scooter Roberts    Diverticulosis of colon (without mention of hemorrhage) 06/03/2015    DR Scooter Roberts    Lung disease     Lung mass     was suspicious for malignancy, but path negative.  MVP (mitral valve prolapse) 5/28/2014    Recurrent sinusitis     DR. NAPIER    [facility]     Thyroid nodule Past Surgical History:   Procedure Laterality Date    BRONCHOSCOPY      CATARACT REMOVAL WITH IMPLANT Left 16    [facility] B[facility] Bacon MD    CATARACT REMOVAL WITH IMPLANT Right 16    [facility] Binta Bacon MD    COLONOSCOPY  2015    DR Teo Burroughs - DIVERTICULOSIS, POLYPS    HYSTERECTOMY      GA COLORECTAL SCRN; HI RISK IND N/A 2018    COLONOSCOPY performed by Juan Carlos Ferrara MD at [address] [place] [address] Left 2016    [facility] PEDRO LUIS TELLO MD      THYROIDECTOMY, PARTIAL  14    PATRICIA CHACON MD    UPPER GASTROINTESTINAL ENDOSCOPY  7/13/15    w/bx      Social History     Socioeconomic History    Marital status:      Spouse name: Not on file    Number of children: Not on file    Years of education: Not on file    Highest education level: Not on file   Occupational History    Not on file   Social Needs    Financial resource strain: Not on file    Food insecurity:     Worry: Not on file     Inability: Not on file    Transportation needs:     Medical: Not on file     Non-medical: Not on file   Tobacco Use    Smoking status: Former Smoker     Packs/day: 0.25     Years: 8.00     Pack years: 2.00     Types: Cigarettes     Last attempt to quit: 1982     Years since quittin.3    Smokeless tobacco: Never Used    Tobacco comment: quit 34 years ago   Substance and Sexual Activity    Alcohol use: No     Alcohol/week: 0.0 oz    Drug use: No    Sexual activity: Never   Lifestyle    Physical activity:     Days per week: Not on file     Minutes per session: Not on file    Stress: Not on file   Relationships    Social connections:     Talks on phone: Not on file     Gets together: Not on file     Attends Zoroastrianism service: Not on file     Active member of club or organization: Not on file     Attends meetings of clubs or organizations: Not on file     Relationship status: Not on file    Intimate partner violence:     Fear of current or ex partner: Not on file     Emotionally abused: Not on file     Physically abused: Not on file     Forced sexual activity: Not on file   Other Topics Concern    Not on file   Social History Narrative    Not on file     No family history on file. Allergies   Allergen Reactions    Lorazepam Other (See Comments)     Hallucinate    Penicillins     Rocephin [Ceftriaxone]     Thiopental Other (See Comments)    Vancomycin     Asa [Aspirin] Palpitations     Current Outpatient Medications   Medication Sig Dispense Refill    azithromycin (ZITHROMAX) 250 MG tablet 500 mg on day 1, then 250 mg days 2-5. 6 tablet 0    predniSONE (DELTASONE) 10 MG tablet Take 5 tabs daily x 3 days, 4 tabs daily x 3 days, 3 tabs daily x 3 days, 2 tabs daily x 3 days, 1 tab daily x 3 days 45 tablet 0    anagrelide (AGRYLIN) 0.5 MG capsule TAKE ONE CAPSULE BY MOUTH TWO TIMES A  capsule 0    anagrelide (AGRYLIN) 0.5 MG capsule Take 1 capsule by mouth 2 times daily 120 capsule 0    pantoprazole (PROTONIX) 40 MG tablet TAKE ONE TABLET BY MOUTH EVERY DAY 30 tablet 3    alendronate (FOSAMAX) 70 MG tablet TAKE ONE TABLET BY MOUTH every 7 days 4 tablet 10    albuterol sulfate  (90 Base) MCG/ACT inhaler Inhale 2 puffs into the lungs every 6 hours as needed for Wheezing 1 Inhaler 3    acetaminophen (APAP EXTRA STRENGTH) 500 MG tablet Take 1 tablet by mouth every 6 hours as needed for Pain 16 tablet 0    sodium chloride 0.9 % irrigation Irrigate with 1 mL as directed 2 times daily  12    mupirocin (BACTROBAN) 2 % ointment Apply 2 Act topically 2 times daily  12    fluticasone (FLONASE) 50 MCG/ACT nasal spray 2 sprays by Nasal route daily 1 Bottle 5    Vitamin D (CHOLECALCIFEROL) 1000 UNITS CAPS capsule Take 1,000 Units by mouth daily      Multiple Vitamin (MULTI VITAMIN DAILY PO) Take by mouth      digoxin (LANOXIN) 125 MCG tablet       verapamil (CALAN-SR) 120 MG CR tablet        No current facility-administered medications for this visit. PMH, Surgical Hx, Family Hx, and Social Hx reviewed and updated. Health Maintenance reviewed. Objective  Vitals:    05/04/19 1544   BP: 100/60   Pulse: 78   Resp: 16   Temp: 98.8 °F (37.1 °C)   TempSrc: Tympanic   SpO2: 98%   Weight: 112 lb (50.8 kg)   Height: 5' (1.524 m)     BP Readings from Last 3 Encounters:   05/04/19 100/60   04/16/19 110/68   02/12/19 122/64     Wt Readings from Last 3 Encounters:   05/04/19 112 lb (50.8 kg)   04/16/19 110 lb 2 oz (50 kg)   02/12/19 112 lb (50.8 kg)     Physical Exam   Constitutional: She is oriented to person, place, and time. She appears well-developed and well-nourished. She is active. She has a sickly appearance. HENT:   Right Ear: Tympanic membrane normal.   Left Ear: Tympanic membrane normal.   Nose: Rhinorrhea present. Right sinus exhibits maxillary sinus tenderness. Right sinus exhibits no frontal sinus tenderness. Left sinus exhibits maxillary sinus tenderness. Left sinus exhibits no frontal sinus tenderness. Mouth/Throat: Uvula is midline and mucous membranes are normal. Posterior oropharyngeal erythema present. Eyes: Pupils are equal, round, and reactive to light. Conjunctivae and EOM are normal.   Neck: Full passive range of motion without pain. No muscular tenderness present. Cardiovascular: Normal rate, regular rhythm, S1 normal, S2 normal and normal heart sounds. Pulmonary/Chest: Effort normal and breath sounds normal. No respiratory distress. She has no wheezes. She has no rhonchi. She has no rales. Musculoskeletal: Normal range of motion. Lymphadenopathy:        Head (right side): No submandibular and no tonsillar adenopathy present. Head (left side): No submandibular and no tonsillar adenopathy present. She has no cervical adenopathy. Neurological: She is alert and oriented to person, place, and time. Skin: Skin is warm, dry and intact. No rash noted. Psychiatric: She has a normal mood and affect.  Her speech is normal and behavior is normal.     Assessment & Plan    Diagnosis Orders   1. Acute non-recurrent maxillary sinusitis  azithromycin (ZITHROMAX) 250 MG tablet   2. COPD with exacerbation (HCC)  predniSONE (DELTASONE) 10 MG tablet    Symptomatic, RXs given. 3. Post-nasal drip  predniSONE (DELTASONE) 10 MG tablet    see 1     No orders of the defined types were placed in this encounter. Orders Placed This Encounter   Medications    azithromycin (ZITHROMAX) 250 MG tablet     Si mg on day 1, then 250 mg days 2-5. Dispense:  6 tablet     Refill:  0    predniSONE (DELTASONE) 10 MG tablet     Sig: Take 5 tabs daily x 3 days, 4 tabs daily x 3 days, 3 tabs daily x 3 days, 2 tabs daily x 3 days, 1 tab daily x 3 days     Dispense:  45 tablet     Refill:  0     There are no discontinued medications. Return in about 2 weeks (around 2019), or if symptoms worsen or fail to improve, for follow up with PCP. Reviewed with the patient: current clinical status,medications, activities and diet. Side effects, adverse effects of themedication prescribed today, as well as treatment plan/ rationale and result expectations have been discussed with the patient who expresses understanding and desires to proceed. Close follow up to evaluate treatment results and for coordination of care. I have reviewed the patient's medical history in detail and updated the computerized patient record.     TREY Liu

## 2019-08-12 NOTE — PROCEDURES
[address]                      [address], [address] [facility]                               PULMONARY FUNCTION    PATIENT NAME: Nickolas Allen                     :        1950  MED REC NO:   [id number]                            ROOM:  ACCOUNT NO:   [de-identified]                           ADMIT DATE: 2019  PROVIDER:     Prema Young MD    DATE OF PROCEDURE:  2019    REASON FOR STUDY:  Shortness of breath, cough, and wheezing. INTERPRETATION:  FVC 1.81, 72% of predicted, FEV1 1.36, 72% of  predicted, FEV1/FVC 75%, and FEF 25-75% is 1.03, 62% of predicted. Postbronchodilator study shows there is no significant response to  bronchodilator. Static lung volume study shows the residual volume is  2.34, 116% of predicted. TLC is 4.10, 92% of predicted. RV to TLC  ratio 57, which is 129% predicted. Diffusion capacity is 13.05, 69% of  predicted. Airway resistance is 1.69, 90% of predicted. SUMMARY:  FVC and FEV1 both decreased with normal ratio though mid flow  FEF 25-75% decreased suggests possible obstruction at small airway  level. There is no significant response to bronchodilator. Static lung  volume study suggests RV to TLC ratio increased suggestive of  hyperinflation. Diffusion capacity is moderately impaired. Airway  resistance is normal.  Flow volume loop suggests obstructive pulmonary  disease. Clinical correlation is requested.         Claudia Patel MD    D: 2019 17:22:25       T: 2019 23:19:33     [id number]  Job#: [id number]     Doc#: [id number]    CC:

## 2019-08-13 PROBLEM — J06.9 URI WITH COUGH AND CONGESTION: Status: ACTIVE | Noted: 2019-01-01

## 2019-08-13 NOTE — PROGRESS NOTES
education level: Not on file   Occupational History    Not on file   Social Needs    Financial resource strain: Not on file    Food insecurity:     Worry: Not on file     Inability: Not on file    Transportation needs:     Medical: Not on file     Non-medical: Not on file   Tobacco Use    Smoking status: Former Smoker     Packs/day: 0.25     Years: 8.00     Pack years: 2.00     Types: Cigarettes     Last attempt to quit: 1982     Years since quittin.6    Smokeless tobacco: Never Used    Tobacco comment: quit 34 years ago   Substance and Sexual Activity    Alcohol use: No     Alcohol/week: 0.0 standard drinks    Drug use: No    Sexual activity: Never   Lifestyle    Physical activity:     Days per week: Not on file     Minutes per session: Not on file    Stress: Not on file   Relationships    Social connections:     Talks on phone: Not on file     Gets together: Not on file     Attends Hindu service: Not on file     Active member of club or organization: Not on file     Attends meetings of clubs or organizations: Not on file     Relationship status: Not on file    Intimate partner violence:     Fear of current or ex partner: Not on file     Emotionally abused: Not on file     Physically abused: Not on file     Forced sexual activity: Not on file   Other Topics Concern    Not on file   Social History Narrative    Not on file         Review of Systems   Constitutional: Negative for chills, diaphoresis, fatigue and fever. HENT: Positive for congestion, postnasal drip and rhinorrhea. Negative for mouth sores, nosebleeds, sinus pressure, sneezing, sore throat, trouble swallowing and voice change. Eyes: Negative for discharge, itching and visual disturbance. Respiratory: Positive for cough (dry). Negative for chest tightness, shortness of breath and wheezing. Cardiovascular: Negative for chest pain, palpitations and leg swelling.    Gastrointestinal: Negative for abdominal pain,

## 2019-08-30 NOTE — PROGRESS NOTES
Subjective  Puma Tomas, 78 y.o. female presents today with:  Chief Complaint   Patient presents with    Nasal Congestion     x 2 weeks    Sinus Problem     x 2 weeks        Sinus Problem   This is a new problem. The current episode started 1 to 4 weeks ago. The problem has been gradually worsening since onset. There has been no fever. Associated symptoms include congestion, coughing, headaches and sinus pressure. Pertinent negatives include no chills, diaphoresis, ear pain, neck pain, shortness of breath, sneezing, sore throat or swollen glands. Treatments tried: allergy/sinus medication. The treatment provided no relief. Past Medical History:   Diagnosis Date    Atrial fibrillation (HCC)     Chronic anemia     Chronic rhinitis     DR. NAPIER    Chronic sinusitis     Colon polyp 06/03/2015    DR Bianca Miller    Diverticulosis of colon (without mention of hemorrhage) 06/03/2015    DR Bianca Miller    Lung disease     Lung mass     was suspicious for malignancy, but path negative.  MVP (mitral valve prolapse) 5/28/2014    Recurrent sinusitis     DR. NAPIER    Thrombocytosis ([facility] [address]     Thyroid nodule       Past Surgical History:   Procedure Laterality Date    BRONCHOSCOPY      CATARACT REMOVAL WITH IMPLANT Left 03/18/16    [facility]    CATARACT REMOVAL WITH IMPLANT Right 04/22/16    [facility] Valarie Bustam[facility] MD    COLONOSCOPY  06/03/2015    DR Bianca Miller - DIVERTICULOSIS, POLYPS    HYSTERECTOMY      TX COLORECTAL SCRN; HI RISK IND N/A 8/6/2018    COLONOSCOPY performed by Manda Silva MD at [address] [place] [address] Left 12/16/2016    Kalvin Apley MD      THYROIDECTOMY, PARTIAL  11/18/14    PATRICIA CHACON MD    UPPER GASTROINTESTINAL ENDOSCOPY  7/13/15    w/bx      No family history on file. Review of Systems   Constitutional: Negative for activity change, appetite change, chills, diaphoresis, fatigue and fever.    HENT: Positive for congestion, sinus pressure and sinus pain. Negative for ear pain, rhinorrhea, sneezing and sore throat. Eyes: Negative for discharge, redness and itching. Respiratory: Positive for cough. Negative for shortness of breath. Gastrointestinal: Negative for abdominal pain, diarrhea, nausea and vomiting. Musculoskeletal: Positive for myalgias. Negative for arthralgias, neck pain and neck stiffness. Neurological: Positive for headaches. Negative for dizziness. PMH, Surgical Hx, Family Hx, and Social Hx reviewed and updated. Objective  Vitals:    08/30/19 1525   BP: 122/68   Pulse: 67   SpO2: 99%   Weight: 113 lb (51.3 kg)   Height: 5' (1.524 m)     BP Readings from Last 3 Encounters:   08/30/19 122/68   08/13/19 118/62   07/24/19 120/62     Wt Readings from Last 3 Encounters:   08/30/19 113 lb (51.3 kg)   08/13/19 113 lb (51.3 kg)   07/24/19 114 lb (51.7 kg)       Physical Exam   Constitutional: She is oriented to person, place, and time. Vital signs are normal. She appears well-developed and well-nourished. HENT:   Head: Normocephalic. Right Ear: Hearing, tympanic membrane, external ear and ear canal normal.   Left Ear: Hearing, tympanic membrane, external ear and ear canal normal.   Nose: Right sinus exhibits maxillary sinus tenderness and frontal sinus tenderness. Left sinus exhibits maxillary sinus tenderness and frontal sinus tenderness. Mouth/Throat: Uvula is midline, oropharynx is clear and moist and mucous membranes are normal. No tonsillar exudate. Pt hit head on counter while checking in. She had leaned down to find something in her purse and upon moving her head back up, she hit an area on top of her head (toward the back). On first assessment she had erythema, but the area no longer had erythema by the time she came back to the exam room for her visit. No area of inflammation noted. Area is clean and dry, and intact upon exam.     Eyes: Conjunctivae are normal.   Neck: Normal range of motion.    Cardiovascular: treatment plan/rationale/result expectations have been discussed with the patient who expresses understanding and desires to proceed. Patient Instructions       Patient Education        Saline Nasal Washes: Care Instructions  Your Care Instructions  Saline nasal washes help keep the nasal passages open by washing out thick or dried mucus. This simple remedy can help relieve symptoms of allergies, sinusitis, and colds. It also can make the nose feel more comfortable by keeping the mucous membranes moist. You may notice a little burning sensation in your nose the first few times you use the solution, but this usually gets better in a few days. Follow-up care is a key part of your treatment and safety. Be sure to make and go to all appointments, and call your doctor if you are having problems. It's also a good idea to know your test results and keep a list of the medicines you take. How can you care for yourself at home? · You can buy premixed saline solution in a squeeze bottle or other sinus rinse products at a drugstore. Read and follow the instructions on the label. · You also can make your own saline solution by adding 1 teaspoon of salt and 1 teaspoon of baking soda to 2 cups of distilled water. · If you use a homemade solution, pour a small amount into a clean bowl. Using a rubber bulb syringe, squeeze the syringe and place the tip in the salt water. Pull a small amount of the salt water into the syringe by relaxing your hand. · Sit down with your head tilted slightly back. Do not lie down. Put the tip of the bulb syringe or the squeeze bottle a little way into one of your nostrils. Gently drip or squirt a few drops into the nostril. Repeat with the other nostril. Some sneezing and gagging are normal at first.  · Gently blow your nose. · Wipe the syringe or bottle tip clean after each use. · Repeat this 2 or 3 times a day. · Use nasal washes gently if you have nosebleeds often.   When should you instructions adapted under license by [facility] ([facility]). If you have questions about a medical condition or this instruction, always ask your healthcare professional. [place] [address] any warranty or liability for your use of this information. Close follow up to evaluate treatment results and for coordination of care. I have reviewed the patient's medical history in detail and updated the computerized patient record.     TREY Rojas NP

## 2019-09-14 NOTE — ED PROVIDER NOTES
Surgical History:   Procedure Laterality Date    BRONCHOSCOPY      CATARACT REMOVAL WITH IMPLANT Left 03/18/16    [facility] Bharathi Silverio MD    CATARACT REMOVAL WITH IMPLANT Right 04/22/16    [facility] Bharathi Silverio MD    COLONOSCOPY  06/03/2015    DR Olesya Jeffery - DIVERTICULOSIS, POLYPS    HYSTERECTOMY      CA COLORECTAL SCRN; HI RISK IND N/A 8/6/2018    COLONOSCOPY performed by Yanely Atkins MD at [address] [place] [address] Av Left 12/16/2016    [facility] PEDRO LUIS TELLO MD      THYROIDECTOMY, PARTIAL  11/18/14    G OSWALDO BANG    UPPER GASTROINTESTINAL ENDOSCOPY  7/13/15    w/bx          CURRENTMEDICATIONS       Previous Medications    ACETAMINOPHEN (APAP EXTRA STRENGTH) 500 MG TABLET    Take 1 tablet by mouth every 6 hours as needed for Pain    ALBUTEROL SULFATE  (90 BASE) MCG/ACT INHALER    Inhale 2 puffs into the lungs every 6 hours as needed for Wheezing    ALENDRONATE (FOSAMAX) 70 MG TABLET    TAKE ONE TABLET BY MOUTH every 7 days    ANAGRELIDE (AGRYLIN) 0.5 MG CAPSULE    TAKE ONE CAPSULE BY MOUTH TWO TIMES A DAY    MAGNESIUM CITRATE 100 MG TABS    Take by mouth    METOPROLOL SUCCINATE (TOPROL XL) 100 MG EXTENDED RELEASE TABLET    TAKE ONE TABLET BY MOUTH DAILY    MULTIPLE VITAMIN (MULTI VITAMIN DAILY PO)    Take by mouth    PANTOPRAZOLE (PROTONIX) 40 MG TABLET    TAKE ONE TABLET BY MOUTH EVERY DAY    SODIUM CHLORIDE (ALTAMIST SPRAY) 0.65 % NASAL SPRAY    1 spray by Nasal route as needed for Congestion    VITAMIN D (CHOLECALCIFEROL) 1000 UNITS CAPS CAPSULE    Take 1,000 Units by mouth daily       ALLERGIES     Lorazepam; Penicillins; Rocephin [ceftriaxone]; Thiopental; Vancomycin; and Asa [aspirin]    FAMILY HISTORY     History reviewed. No pertinent family history.        SOCIAL HISTORY       Social History     Socioeconomic History    Marital status:      Spouse name: None    Number of children: None    Years of education: None    Highest education level: None   Occupational History    normal. She exhibits no distension. There is no tenderness. Musculoskeletal: Normal range of motion. Neurological: She is alert and oriented to person, place, and time. Skin: Skin is warm and dry. Psychiatric: She has a normal mood and affect. Nursing note and vitals reviewed. MDM  77 yo female presents to the ED with sinus congestion, pressure x 6 weeks. Pt is afebrile, hemodynamically stable. Pt requesting something for her sinus headache and nausea. Pt given PO percocet, PO zofran with moderate relief. CT sinuses shows chronic sinusitis. Pt educated about the results and educated about chronic sinusitis. Suspect likely seasonal allergies as part of the problem. Pt given prescription for tramadol, zofran, zyrtec, 2 week course of azithromycin. Pt states she followed up with ENT however does not want to anymore. Pt educated about sinus rinses and neti pot. Pt given sinusitis warning signs and will f/u with pcp. Pt understands plan. FINAL IMPRESSION      1. Chronic maxillary sinusitis    2.  Acute nonintractable headache, unspecified headache type          DISPOSITION/PLAN   DISPOSITION Discharge - Pending Orders Complete 09/14/2019 10:55:39 AM        DISCHARGE MEDICATIONS:  [unfilled]         Gabriel Harvey MD(electronically signed)  Attending Emergency Physician            Gabriel Harvey MD  09/14/19 1210

## 2019-10-25 PROBLEM — K57.32 DIVERTICULITIS OF COLON: Status: ACTIVE | Noted: 2019-01-01

## 2019-11-14 PROBLEM — J44.1 COPD EXACERBATION (HCC): Status: ACTIVE | Noted: 2019-01-01

## 2019-12-05 PROBLEM — A41.9 SEPSIS (HCC): Status: ACTIVE | Noted: 2019-01-01

## 2019-12-15 PROBLEM — I48.91 ATRIAL FIBRILLATION (HCC): Status: ACTIVE | Noted: 2019-01-01

## 2020-01-01 ENCOUNTER — TELEPHONE (OUTPATIENT)
Dept: ADMINISTRATIVE | Age: 70
End: 2020-01-01

## 2020-01-01 ENCOUNTER — APPOINTMENT (OUTPATIENT)
Dept: CT IMAGING | Age: 70
DRG: 208 | End: 2020-01-01
Payer: MEDICARE

## 2020-01-01 ENCOUNTER — TELEPHONE (OUTPATIENT)
Dept: FAMILY MEDICINE CLINIC | Age: 70
End: 2020-01-01

## 2020-01-01 ENCOUNTER — HOSPITAL ENCOUNTER (OUTPATIENT)
Dept: CARDIOLOGY | Age: 70
Discharge: HOME | End: 2020-02-26
Payer: MEDICARE

## 2020-01-01 ENCOUNTER — TELEPHONE (OUTPATIENT)
Dept: INTERVENTIONAL RADIOLOGY/VASCULAR | Age: 70
End: 2020-01-01

## 2020-01-01 ENCOUNTER — HOSPITAL ENCOUNTER (OUTPATIENT)
Dept: CT IMAGING | Age: 70
Discharge: HOME | End: 2020-03-01
Payer: MEDICARE

## 2020-01-01 ENCOUNTER — APPOINTMENT (OUTPATIENT)
Dept: GENERAL RADIOLOGY | Age: 70
DRG: 208 | End: 2020-01-01
Payer: MEDICARE

## 2020-01-01 ENCOUNTER — OFFICE VISIT (OUTPATIENT)
Dept: PULMONOLOGY | Age: 70
End: 2020-01-01
Payer: MEDICARE

## 2020-01-01 ENCOUNTER — HOSPITAL ENCOUNTER (EMERGENCY)
Age: 70
Discharge: ANOTHER HOSPITAL | End: 2020-03-13
Attending: EMERGENCY MEDICINE
Payer: MEDICARE

## 2020-01-01 ENCOUNTER — HOSPITAL ENCOUNTER (OUTPATIENT)
Dept: CARDIOLOGY | Age: 70
Discharge: HOME | End: 2020-01-29
Payer: MEDICARE

## 2020-01-01 ENCOUNTER — OFFICE VISIT (OUTPATIENT)
Dept: FAMILY MEDICINE CLINIC | Age: 70
End: 2020-01-01
Payer: MEDICARE

## 2020-01-01 ENCOUNTER — TELEPHONE (OUTPATIENT)
Dept: PULMONOLOGY | Age: 70
End: 2020-01-01

## 2020-01-01 ENCOUNTER — HOSPITAL ENCOUNTER (INPATIENT)
Age: 70
LOS: 3 days | DRG: 208 | End: 2020-04-12
Attending: EMERGENCY MEDICINE | Admitting: FAMILY MEDICINE
Payer: MEDICARE

## 2020-01-01 VITALS
RESPIRATION RATE: 16 BRPM | DIASTOLIC BLOOD PRESSURE: 60 MMHG | TEMPERATURE: 98.6 F | OXYGEN SATURATION: 94 % | HEART RATE: 83 BPM | WEIGHT: 90 LBS | BODY MASS INDEX: 17.67 KG/M2 | HEIGHT: 60 IN | SYSTOLIC BLOOD PRESSURE: 100 MMHG

## 2020-01-01 VITALS
HEART RATE: 64 BPM | BODY MASS INDEX: 18.34 KG/M2 | RESPIRATION RATE: 16 BRPM | WEIGHT: 93.4 LBS | SYSTOLIC BLOOD PRESSURE: 114 MMHG | OXYGEN SATURATION: 98 % | DIASTOLIC BLOOD PRESSURE: 60 MMHG | HEIGHT: 60 IN | TEMPERATURE: 97.3 F

## 2020-01-01 VITALS
OXYGEN SATURATION: 96 % | DIASTOLIC BLOOD PRESSURE: 56 MMHG | HEART RATE: 88 BPM | HEIGHT: 60 IN | BODY MASS INDEX: 19.16 KG/M2 | SYSTOLIC BLOOD PRESSURE: 100 MMHG | WEIGHT: 97.6 LBS

## 2020-01-01 VITALS
OXYGEN SATURATION: 100 % | HEART RATE: 85 BPM | HEIGHT: 60 IN | WEIGHT: 102 LBS | SYSTOLIC BLOOD PRESSURE: 125 MMHG | RESPIRATION RATE: 18 BRPM | BODY MASS INDEX: 20.03 KG/M2 | TEMPERATURE: 97.8 F | DIASTOLIC BLOOD PRESSURE: 67 MMHG

## 2020-01-01 VITALS
SYSTOLIC BLOOD PRESSURE: 135 MMHG | HEIGHT: 60 IN | HEART RATE: 84 BPM | RESPIRATION RATE: 18 BRPM | DIASTOLIC BLOOD PRESSURE: 61 MMHG | WEIGHT: 95 LBS | BODY MASS INDEX: 18.65 KG/M2 | TEMPERATURE: 97.6 F | OXYGEN SATURATION: 100 %

## 2020-01-01 VITALS — BODY MASS INDEX: 18.46 KG/M2 | HEIGHT: 60 IN | WEIGHT: 94 LBS

## 2020-01-01 DIAGNOSIS — E83.42 HYPOMAGNESEMIA: ICD-10-CM

## 2020-01-01 DIAGNOSIS — R53.83 FATIGUE, UNSPECIFIED TYPE: ICD-10-CM

## 2020-01-01 LAB
ABO/RH: NORMAL
ALBUMIN SERPL-MCNC: 2.6 G/DL (ref 3.5–4.6)
ALBUMIN SERPL-MCNC: 2.8 G/DL (ref 3.5–4.6)
ALBUMIN SERPL-MCNC: 3 G/DL (ref 3.5–4.6)
ALP BLD-CCNC: 128 U/L (ref 40–130)
ALP BLD-CCNC: 68 U/L (ref 40–130)
ALP BLD-CCNC: 85 U/L (ref 40–130)
ALT SERPL-CCNC: 340 U/L (ref 0–33)
ALT SERPL-CCNC: 6 U/L (ref 0–33)
ALT SERPL-CCNC: <5 U/L (ref 0–33)
ANA PATTERN: ABNORMAL
ANA TITER: ABNORMAL
ANCA IFA: ABNORMAL
ANION GAP SERPL CALCULATED.3IONS-SCNC: 14 MEQ/L (ref 9–15)
ANION GAP SERPL CALCULATED.3IONS-SCNC: 15 MEQ/L (ref 9–15)
ANION GAP SERPL CALCULATED.3IONS-SCNC: 15 MEQ/L (ref 9–15)
ANION GAP SERPL CALCULATED.3IONS-SCNC: 16 MEQ/L (ref 9–15)
ANION GAP SERPL CALCULATED.3IONS-SCNC: 16 MEQ/L (ref 9–15)
ANION GAP SERPL CALCULATED.3IONS-SCNC: 19 MEQ/L (ref 9–15)
ANISOCYTOSIS: ABNORMAL
ANTIBODY SCREEN: NORMAL
ANTINUCLEAR AB INTERPRETIVE COMMENT: ABNORMAL
ANTINUCLEAR ANTIBODY, HEP-2, IGG: DETECTED
AST SERPL-CCNC: 10 U/L (ref 0–35)
AST SERPL-CCNC: 404 U/L (ref 0–35)
AST SERPL-CCNC: 9 U/L (ref 0–35)
BANDED NEUTROPHILS RELATIVE PERCENT: 1 % (ref 5–11)
BASE EXCESS ARTERIAL: -1 (ref -3–3)
BASE EXCESS ARTERIAL: 5 (ref -3–3)
BASE EXCESS ARTERIAL: 8 (ref -3–3)
BASOPHILS ABSOLUTE: 0 K/UL (ref 0–0.2)
BASOPHILS ABSOLUTE: 0.1 K/UL (ref 0–0.2)
BASOPHILS RELATIVE PERCENT: 0.1 %
BASOPHILS RELATIVE PERCENT: 0.1 %
BASOPHILS RELATIVE PERCENT: 0.2 %
BASOPHILS RELATIVE PERCENT: 0.7 %
BILIRUB SERPL-MCNC: 0.4 MG/DL (ref 0.2–0.7)
BILIRUB SERPL-MCNC: <0.2 MG/DL (ref 0.2–0.7)
BILIRUB SERPL-MCNC: <0.2 MG/DL (ref 0.2–0.7)
BLOOD BANK DISPENSE STATUS: NORMAL
BLOOD BANK PRODUCT CODE: NORMAL
BPU ID: NORMAL
BUN BLDV-MCNC: 30 MG/DL (ref 8–23)
BUN BLDV-MCNC: 43 MG/DL (ref 8–23)
BUN BLDV-MCNC: 45 MG/DL (ref 8–23)
BUN BLDV-MCNC: 54 MG/DL (ref 8–23)
BUN BLDV-MCNC: 63 MG/DL (ref 8–23)
BUN BLDV-MCNC: 63 MG/DL (ref 8–23)
C-REACTIVE PROTEIN: 35.9 MG/L (ref 0–5)
CALCIUM IONIZED: 1.18 MMOL/L (ref 1.12–1.32)
CALCIUM IONIZED: 1.19 MMOL/L (ref 1.12–1.32)
CALCIUM IONIZED: 1.2 MMOL/L (ref 1.12–1.32)
CALCIUM SERPL-MCNC: 8.2 MG/DL (ref 8.5–9.9)
CALCIUM SERPL-MCNC: 8.3 MG/DL (ref 8.5–9.9)
CALCIUM SERPL-MCNC: 8.3 MG/DL (ref 8.5–9.9)
CALCIUM SERPL-MCNC: 8.4 MG/DL (ref 8.5–9.9)
CALCIUM SERPL-MCNC: 8.6 MG/DL (ref 8.5–9.9)
CALCIUM SERPL-MCNC: 8.7 MG/DL (ref 8.5–9.9)
CHLORIDE BLD-SCNC: 101 MEQ/L (ref 95–107)
CHLORIDE BLD-SCNC: 104 MEQ/L (ref 95–107)
CHLORIDE BLD-SCNC: 106 MEQ/L (ref 95–107)
CHLORIDE BLD-SCNC: 106 MEQ/L (ref 95–107)
CO2: 17 MEQ/L (ref 20–31)
CO2: 19 MEQ/L (ref 20–31)
CO2: 20 MEQ/L (ref 20–31)
CO2: 23 MEQ/L (ref 20–31)
CO2: 25 MEQ/L (ref 20–31)
CO2: 27 MEQ/L (ref 20–31)
CREAT SERPL-MCNC: 0.83 MG/DL (ref 0.5–0.9)
CREAT SERPL-MCNC: 0.88 MG/DL (ref 0.5–0.9)
CREAT SERPL-MCNC: 0.91 MG/DL (ref 0.5–0.9)
CREAT SERPL-MCNC: 1.46 MG/DL (ref 0.5–0.9)
CREAT SERPL-MCNC: 1.83 MG/DL (ref 0.5–0.9)
CREAT SERPL-MCNC: 2.02 MG/DL (ref 0.5–0.9)
DESCRIPTION BLOOD BANK: NORMAL
EKG ATRIAL RATE: 70 BPM
EKG ATRIAL RATE: 81 BPM
EKG ATRIAL RATE: 87 BPM
EKG P AXIS: 44 DEGREES
EKG P AXIS: 63 DEGREES
EKG P AXIS: 78 DEGREES
EKG P-R INTERVAL: 120 MS
EKG P-R INTERVAL: 126 MS
EKG P-R INTERVAL: 136 MS
EKG Q-T INTERVAL: 416 MS
EKG Q-T INTERVAL: 456 MS
EKG Q-T INTERVAL: 466 MS
EKG QRS DURATION: 128 MS
EKG QRS DURATION: 128 MS
EKG QRS DURATION: 130 MS
EKG QTC CALCULATION (BAZETT): 500 MS
EKG QTC CALCULATION (BAZETT): 503 MS
EKG QTC CALCULATION (BAZETT): 529 MS
EKG R AXIS: -65 DEGREES
EKG R AXIS: -70 DEGREES
EKG R AXIS: -81 DEGREES
EKG T AXIS: 66 DEGREES
EKG T AXIS: 71 DEGREES
EKG T AXIS: 81 DEGREES
EKG VENTRICULAR RATE: 70 BPM
EKG VENTRICULAR RATE: 81 BPM
EKG VENTRICULAR RATE: 87 BPM
EOSINOPHILS ABSOLUTE: 0 K/UL (ref 0–0.7)
EOSINOPHILS ABSOLUTE: 0.1 K/UL (ref 0–0.7)
EOSINOPHILS RELATIVE PERCENT: 0 %
EOSINOPHILS RELATIVE PERCENT: 0 %
EOSINOPHILS RELATIVE PERCENT: 0.3 %
EOSINOPHILS RELATIVE PERCENT: 1 %
GFR AFRICAN AMERICAN: 29.4
GFR AFRICAN AMERICAN: 32.9
GFR AFRICAN AMERICAN: 42.7
GFR AFRICAN AMERICAN: >60
GFR NON-AFRICAN AMERICAN: 24.3
GFR NON-AFRICAN AMERICAN: 27.2
GFR NON-AFRICAN AMERICAN: 35.3
GFR NON-AFRICAN AMERICAN: 55
GFR NON-AFRICAN AMERICAN: >60
GLOBULIN: 2 G/DL (ref 2.3–3.5)
GLOBULIN: 3.5 G/DL (ref 2.3–3.5)
GLOBULIN: 3.6 G/DL (ref 2.3–3.5)
GLUCOSE BLD-MCNC: 116 MG/DL (ref 70–99)
GLUCOSE BLD-MCNC: 119 MG/DL (ref 70–99)
GLUCOSE BLD-MCNC: 127 MG/DL (ref 70–99)
GLUCOSE BLD-MCNC: 134 MG/DL (ref 70–99)
GLUCOSE BLD-MCNC: 164 MG/DL (ref 60–115)
GLUCOSE BLD-MCNC: 182 MG/DL (ref 70–99)
GLUCOSE BLD-MCNC: 68 MG/DL (ref 70–99)
GLUCOSE BLD-MCNC: 81 MG/DL (ref 60–115)
GLUCOSE BLD-MCNC: 82 MG/DL (ref 60–115)
HCO3 ARTERIAL: 26.6 MMOL/L (ref 21–29)
HCO3 ARTERIAL: 28.9 MMOL/L (ref 21–29)
HCO3 ARTERIAL: 31.3 MMOL/L (ref 21–29)
HCT VFR BLD CALC: 20.6 % (ref 37–47)
HCT VFR BLD CALC: 22.1 % (ref 37–47)
HCT VFR BLD CALC: 22.4 % (ref 37–47)
HCT VFR BLD CALC: 26.9 % (ref 37–47)
HCT VFR BLD CALC: 29.1 % (ref 37–47)
HCT VFR BLD CALC: 29.4 % (ref 37–47)
HEMOGLOBIN: 6.6 G/DL (ref 12–16)
HEMOGLOBIN: 6.9 GM/DL (ref 12–16)
HEMOGLOBIN: 7.1 G/DL (ref 12–16)
HEMOGLOBIN: 7.2 G/DL (ref 12–16)
HEMOGLOBIN: 8 GM/DL (ref 12–16)
HEMOGLOBIN: 8.4 G/DL (ref 12–16)
HEMOGLOBIN: 8.4 GM/DL (ref 12–16)
HEMOGLOBIN: 9.1 G/DL (ref 12–16)
HEMOGLOBIN: 9.3 G/DL (ref 12–16)
LACTATE: 0.47 MMOL/L (ref 0.4–2)
LACTATE: 1.36 MMOL/L (ref 0.4–2)
LACTATE: 3.52 MMOL/L (ref 0.4–2)
LV EF: 50 %
LVEF MODALITY: NORMAL
LYMPHOCYTES ABSOLUTE: 0.3 K/UL (ref 1–4.8)
LYMPHOCYTES ABSOLUTE: 0.3 K/UL (ref 1–4.8)
LYMPHOCYTES ABSOLUTE: 1 K/UL (ref 1–4.8)
LYMPHOCYTES ABSOLUTE: 1.1 K/UL (ref 1–4.8)
LYMPHOCYTES RELATIVE PERCENT: 2.8 %
LYMPHOCYTES RELATIVE PERCENT: 4.7 %
LYMPHOCYTES RELATIVE PERCENT: 8 %
LYMPHOCYTES RELATIVE PERCENT: 9.3 %
MAGNESIUM: 1.4 MG/DL (ref 1.7–2.4)
MAGNESIUM: 1.5 MG/DL (ref 1.7–2.4)
MAGNESIUM: 1.5 MG/DL (ref 1.7–2.4)
MCH RBC QN AUTO: 25.2 PG (ref 27–31.3)
MCH RBC QN AUTO: 25.7 PG (ref 27–31.3)
MCH RBC QN AUTO: 25.8 PG (ref 27–31.3)
MCH RBC QN AUTO: 28.2 PG (ref 27–31.3)
MCH RBC QN AUTO: 28.5 PG (ref 27–31.3)
MCH RBC QN AUTO: 28.6 PG (ref 27–31.3)
MCHC RBC AUTO-ENTMCNC: 31.2 % (ref 33–37)
MCHC RBC AUTO-ENTMCNC: 31.2 % (ref 33–37)
MCHC RBC AUTO-ENTMCNC: 31.8 % (ref 33–37)
MCHC RBC AUTO-ENTMCNC: 31.9 % (ref 33–37)
MCHC RBC AUTO-ENTMCNC: 31.9 % (ref 33–37)
MCHC RBC AUTO-ENTMCNC: 32.2 % (ref 33–37)
MCV RBC AUTO: 80.9 FL (ref 82–100)
MCV RBC AUTO: 81 FL (ref 82–100)
MCV RBC AUTO: 82.6 FL (ref 82–100)
MCV RBC AUTO: 88.3 FL (ref 82–100)
MCV RBC AUTO: 89 FL (ref 82–100)
MCV RBC AUTO: 89.5 FL (ref 82–100)
METAMYELOCYTES RELATIVE PERCENT: 1 %
MICROCYTES: ABNORMAL
MONOCYTES ABSOLUTE: 0.1 K/UL (ref 0.2–0.8)
MONOCYTES ABSOLUTE: 0.1 K/UL (ref 0.2–0.8)
MONOCYTES ABSOLUTE: 0.2 K/UL (ref 0.2–0.8)
MONOCYTES ABSOLUTE: 0.4 K/UL (ref 0.2–0.8)
MONOCYTES RELATIVE PERCENT: 0.9 %
MONOCYTES RELATIVE PERCENT: 1.3 %
MONOCYTES RELATIVE PERCENT: 1.9 %
MONOCYTES RELATIVE PERCENT: 3.9 %
MYELOCYTE PERCENT: 1 %
NEUTROPHILS ABSOLUTE: 12.9 K/UL (ref 1.4–6.5)
NEUTROPHILS ABSOLUTE: 6.1 K/UL (ref 1.4–6.5)
NEUTROPHILS ABSOLUTE: 9.1 K/UL (ref 1.4–6.5)
NEUTROPHILS ABSOLUTE: 9.3 K/UL (ref 1.4–6.5)
NEUTROPHILS RELATIVE PERCENT: 85.8 %
NEUTROPHILS RELATIVE PERCENT: 88 %
NEUTROPHILS RELATIVE PERCENT: 93.9 %
NEUTROPHILS RELATIVE PERCENT: 95.1 %
O2 SAT, ARTERIAL: 99 % (ref 93–100)
PCO2 ARTERIAL: 40 MM HG (ref 35–45)
PCO2 ARTERIAL: 43 MM HG (ref 35–45)
PCO2 ARTERIAL: 65 MM HG (ref 35–45)
PDW BLD-RTO: 17.3 % (ref 11.5–14.5)
PDW BLD-RTO: 18 % (ref 11.5–14.5)
PDW BLD-RTO: 18.3 % (ref 11.5–14.5)
PDW BLD-RTO: 19.9 % (ref 11.5–14.5)
PDW BLD-RTO: 20.1 % (ref 11.5–14.5)
PDW BLD-RTO: 20.3 % (ref 11.5–14.5)
PERFORMED ON: ABNORMAL
PH ARTERIAL: 7.22 (ref 7.35–7.45)
PH ARTERIAL: 7.46 (ref 7.35–7.45)
PH ARTERIAL: 7.46 (ref 7.35–7.45)
PLATELET # BLD: 250 K/UL (ref 130–400)
PLATELET # BLD: 268 K/UL (ref 130–400)
PLATELET # BLD: 277 K/UL (ref 130–400)
PLATELET # BLD: 472 K/UL (ref 130–400)
PLATELET # BLD: 616 K/UL (ref 130–400)
PLATELET # BLD: 634 K/UL (ref 130–400)
PLATELET SLIDE REVIEW: NORMAL
PO2 ARTERIAL: 112 MM HG (ref 75–108)
PO2 ARTERIAL: 126 MM HG (ref 75–108)
PO2 ARTERIAL: 177 MM HG (ref 75–108)
POC CHLORIDE: 103 MEQ/L (ref 99–110)
POC CHLORIDE: 104 MEQ/L (ref 99–110)
POC CHLORIDE: 105 MEQ/L (ref 99–110)
POC CREATININE: 1 MG/DL (ref 0.6–1.2)
POC FIO2: 35
POC FIO2: 40
POC FIO2: 70
POC HEMATOCRIT: 20 % (ref 36–48)
POC HEMATOCRIT: 23 % (ref 36–48)
POC HEMATOCRIT: 25 % (ref 36–48)
POC POTASSIUM: 3.4 MEQ/L (ref 3.5–5.1)
POC POTASSIUM: 3.5 MEQ/L (ref 3.5–5.1)
POC POTASSIUM: 3.6 MEQ/L (ref 3.5–5.1)
POC SAMPLE TYPE: ABNORMAL
POC SODIUM: 140 MEQ/L (ref 136–145)
POC SODIUM: 143 MEQ/L (ref 136–145)
POC SODIUM: 144 MEQ/L (ref 136–145)
POTASSIUM REFLEX MAGNESIUM: 4.4 MEQ/L (ref 3.4–4.9)
POTASSIUM REFLEX MAGNESIUM: 4.4 MEQ/L (ref 3.4–4.9)
POTASSIUM SERPL-SCNC: 3.9 MEQ/L (ref 3.4–4.9)
POTASSIUM SERPL-SCNC: 5 MEQ/L (ref 3.4–4.9)
POTASSIUM SERPL-SCNC: 5.6 MEQ/L (ref 3.4–4.9)
POTASSIUM SERPL-SCNC: 6 MEQ/L (ref 3.4–4.9)
PRO-BNP: NORMAL PG/ML
RBC # BLD: 2.33 M/UL (ref 4.2–5.4)
RBC # BLD: 2.47 M/UL (ref 4.2–5.4)
RBC # BLD: 2.52 M/UL (ref 4.2–5.4)
RBC # BLD: 3.32 M/UL (ref 4.2–5.4)
RBC # BLD: 3.53 M/UL (ref 4.2–5.4)
RBC # BLD: 3.62 M/UL (ref 4.2–5.4)
RHEUMATOID FACTOR: 22.7 IU/ML (ref 0–14)
SEDIMENTATION RATE, ERYTHROCYTE: 97 MM (ref 0–30)
SODIUM BLD-SCNC: 137 MEQ/L (ref 135–144)
SODIUM BLD-SCNC: 138 MEQ/L (ref 135–144)
SODIUM BLD-SCNC: 138 MEQ/L (ref 135–144)
SODIUM BLD-SCNC: 144 MEQ/L (ref 135–144)
SODIUM BLD-SCNC: 145 MEQ/L (ref 135–144)
SODIUM BLD-SCNC: 149 MEQ/L (ref 135–144)
TCO2 ARTERIAL: 29 (ref 22–29)
TCO2 ARTERIAL: 30 (ref 22–29)
TCO2 ARTERIAL: 33 (ref 22–29)
TOTAL PROTEIN: 4.6 G/DL (ref 6.3–8)
TOTAL PROTEIN: 6.3 G/DL (ref 6.3–8)
TOTAL PROTEIN: 6.6 G/DL (ref 6.3–8)
TROPONIN: 0.07 NG/ML (ref 0–0.01)
TROPONIN: 0.09 NG/ML (ref 0–0.01)
TROPONIN: 0.1 NG/ML (ref 0–0.01)
TROPONIN: 0.12 NG/ML (ref 0–0.01)
TROPONIN: <0.01 NG/ML (ref 0–0.01)
TSH SERPL DL<=0.05 MIU/L-ACNC: 3.24 UIU/ML (ref 0.44–3.86)
WBC # BLD: 10.8 K/UL (ref 4.8–10.8)
WBC # BLD: 10.9 K/UL (ref 4.8–10.8)
WBC # BLD: 13.6 K/UL (ref 4.8–10.8)
WBC # BLD: 14.2 K/UL (ref 4.8–10.8)
WBC # BLD: 6.5 K/UL (ref 4.8–10.8)
WBC # BLD: 9.5 K/UL (ref 4.8–10.8)

## 2020-01-01 PROCEDURE — P9016 RBC LEUKOCYTES REDUCED: HCPCS

## 2020-01-01 PROCEDURE — 2060000000 HC ICU INTERMEDIATE R&B

## 2020-01-01 PROCEDURE — 93010 ELECTROCARDIOGRAM REPORT: CPT | Performed by: INTERNAL MEDICINE

## 2020-01-01 PROCEDURE — 80048 BASIC METABOLIC PNL TOTAL CA: CPT

## 2020-01-01 PROCEDURE — 86140 C-REACTIVE PROTEIN: CPT

## 2020-01-01 PROCEDURE — 94660 CPAP INITIATION&MGMT: CPT

## 2020-01-01 PROCEDURE — 6360000002 HC RX W HCPCS: Performed by: EMERGENCY MEDICINE

## 2020-01-01 PROCEDURE — 85025 COMPLETE CBC W/AUTO DIFF WBC: CPT

## 2020-01-01 PROCEDURE — 6360000002 HC RX W HCPCS: Performed by: INTERNAL MEDICINE

## 2020-01-01 PROCEDURE — 5A1935Z RESPIRATORY VENTILATION, LESS THAN 24 CONSECUTIVE HOURS: ICD-10-PCS | Performed by: EMERGENCY MEDICINE

## 2020-01-01 PROCEDURE — 86923 COMPATIBILITY TEST ELECTRIC: CPT

## 2020-01-01 PROCEDURE — 93005 ELECTROCARDIOGRAM TRACING: CPT | Performed by: EMERGENCY MEDICINE

## 2020-01-01 PROCEDURE — 6370000000 HC RX 637 (ALT 250 FOR IP): Performed by: INTERNAL MEDICINE

## 2020-01-01 PROCEDURE — 82435 ASSAY OF BLOOD CHLORIDE: CPT

## 2020-01-01 PROCEDURE — 96361 HYDRATE IV INFUSION ADD-ON: CPT

## 2020-01-01 PROCEDURE — 2580000003 HC RX 258: Performed by: EMERGENCY MEDICINE

## 2020-01-01 PROCEDURE — 84132 ASSAY OF SERUM POTASSIUM: CPT

## 2020-01-01 PROCEDURE — 6370000000 HC RX 637 (ALT 250 FOR IP): Performed by: FAMILY MEDICINE

## 2020-01-01 PROCEDURE — 99232 SBSQ HOSP IP/OBS MODERATE 35: CPT | Performed by: INTERNAL MEDICINE

## 2020-01-01 PROCEDURE — 2700000000 HC OXYGEN THERAPY PER DAY

## 2020-01-01 PROCEDURE — 99291 CRITICAL CARE FIRST HOUR: CPT | Performed by: INTERNAL MEDICINE

## 2020-01-01 PROCEDURE — 86900 BLOOD TYPING SEROLOGIC ABO: CPT

## 2020-01-01 PROCEDURE — 84484 ASSAY OF TROPONIN QUANT: CPT

## 2020-01-01 PROCEDURE — 84295 ASSAY OF SERUM SODIUM: CPT

## 2020-01-01 PROCEDURE — 99222 1ST HOSP IP/OBS MODERATE 55: CPT | Performed by: INTERNAL MEDICINE

## 2020-01-01 PROCEDURE — 36600 WITHDRAWAL OF ARTERIAL BLOOD: CPT

## 2020-01-01 PROCEDURE — 99214 OFFICE O/P EST MOD 30 MIN: CPT | Performed by: INTERNAL MEDICINE

## 2020-01-01 PROCEDURE — 2500000003 HC RX 250 WO HCPCS: Performed by: EMERGENCY MEDICINE

## 2020-01-01 PROCEDURE — 99233 SBSQ HOSP IP/OBS HIGH 50: CPT | Performed by: INTERNAL MEDICINE

## 2020-01-01 PROCEDURE — 82565 ASSAY OF CREATININE: CPT

## 2020-01-01 PROCEDURE — 96374 THER/PROPH/DIAG INJ IV PUSH: CPT

## 2020-01-01 PROCEDURE — 99285 EMERGENCY DEPT VISIT HI MDM: CPT

## 2020-01-01 PROCEDURE — 97166 OT EVAL MOD COMPLEX 45 MIN: CPT

## 2020-01-01 PROCEDURE — 71250 CT THORAX DX C-: CPT

## 2020-01-01 PROCEDURE — 87040 BLOOD CULTURE FOR BACTERIA: CPT

## 2020-01-01 PROCEDURE — 2580000003 HC RX 258: Performed by: FAMILY MEDICINE

## 2020-01-01 PROCEDURE — 6360000002 HC RX W HCPCS: Performed by: FAMILY MEDICINE

## 2020-01-01 PROCEDURE — 83880 ASSAY OF NATRIURETIC PEPTIDE: CPT

## 2020-01-01 PROCEDURE — 82803 BLOOD GASES ANY COMBINATION: CPT

## 2020-01-01 PROCEDURE — 2500000003 HC RX 250 WO HCPCS: Performed by: FAMILY MEDICINE

## 2020-01-01 PROCEDURE — 85652 RBC SED RATE AUTOMATED: CPT

## 2020-01-01 PROCEDURE — 71045 X-RAY EXAM CHEST 1 VIEW: CPT

## 2020-01-01 PROCEDURE — 83605 ASSAY OF LACTIC ACID: CPT

## 2020-01-01 PROCEDURE — 86850 RBC ANTIBODY SCREEN: CPT

## 2020-01-01 PROCEDURE — 93005 ELECTROCARDIOGRAM TRACING: CPT | Performed by: INTERNAL MEDICINE

## 2020-01-01 PROCEDURE — 0BH17EZ INSERTION OF ENDOTRACHEAL AIRWAY INTO TRACHEA, VIA NATURAL OR ARTIFICIAL OPENING: ICD-10-PCS | Performed by: EMERGENCY MEDICINE

## 2020-01-01 PROCEDURE — 97162 PT EVAL MOD COMPLEX 30 MIN: CPT

## 2020-01-01 PROCEDURE — C8929 TTE W OR WO FOL WCON,DOPPLER: HCPCS

## 2020-01-01 PROCEDURE — 99231 SBSQ HOSP IP/OBS SF/LOW 25: CPT | Performed by: INTERNAL MEDICINE

## 2020-01-01 PROCEDURE — 36415 COLL VENOUS BLD VENIPUNCTURE: CPT

## 2020-01-01 PROCEDURE — 93280 PM DEVICE PROGR EVAL DUAL: CPT

## 2020-01-01 PROCEDURE — 85014 HEMATOCRIT: CPT

## 2020-01-01 PROCEDURE — 82330 ASSAY OF CALCIUM: CPT

## 2020-01-01 PROCEDURE — 83735 ASSAY OF MAGNESIUM: CPT

## 2020-01-01 PROCEDURE — 31500 INSERT EMERGENCY AIRWAY: CPT

## 2020-01-01 PROCEDURE — 2500000003 HC RX 250 WO HCPCS: Performed by: INTERNAL MEDICINE

## 2020-01-01 PROCEDURE — 86901 BLOOD TYPING SEROLOGIC RH(D): CPT

## 2020-01-01 PROCEDURE — 36430 TRANSFUSION BLD/BLD COMPNT: CPT

## 2020-01-01 PROCEDURE — 99213 OFFICE O/P EST LOW 20 MIN: CPT | Performed by: FAMILY MEDICINE

## 2020-01-01 PROCEDURE — 80053 COMPREHEN METABOLIC PANEL: CPT

## 2020-01-01 PROCEDURE — 71275 CT ANGIOGRAPHY CHEST: CPT

## 2020-01-01 PROCEDURE — 94002 VENT MGMT INPAT INIT DAY: CPT

## 2020-01-01 PROCEDURE — 6360000002 HC RX W HCPCS

## 2020-01-01 PROCEDURE — 6360000004 HC RX CONTRAST MEDICATION: Performed by: INTERNAL MEDICINE

## 2020-01-01 PROCEDURE — 6370000000 HC RX 637 (ALT 250 FOR IP): Performed by: EMERGENCY MEDICINE

## 2020-01-01 RX ORDER — ACETAMINOPHEN 325 MG/1
650 TABLET ORAL EVERY 6 HOURS PRN
Status: DISCONTINUED | OUTPATIENT
Start: 2020-01-01 | End: 2020-01-01 | Stop reason: HOSPADM

## 2020-01-01 RX ORDER — MIRTAZAPINE 15 MG/1
15 TABLET, FILM COATED ORAL NIGHTLY
Qty: 30 TABLET | Refills: 5 | Status: SHIPPED | OUTPATIENT
Start: 2020-01-01

## 2020-01-01 RX ORDER — SODIUM CHLORIDE 0.9 % (FLUSH) 0.9 %
10 SYRINGE (ML) INJECTION PRN
Status: DISCONTINUED | OUTPATIENT
Start: 2020-01-01 | End: 2020-01-01 | Stop reason: HOSPADM

## 2020-01-01 RX ORDER — 0.9 % SODIUM CHLORIDE 0.9 %
500 INTRAVENOUS SOLUTION INTRAVENOUS ONCE
Status: COMPLETED | OUTPATIENT
Start: 2020-01-01 | End: 2020-01-01

## 2020-01-01 RX ORDER — PROMETHAZINE HYDROCHLORIDE 25 MG/1
12.5 TABLET ORAL EVERY 6 HOURS PRN
Status: DISCONTINUED | OUTPATIENT
Start: 2020-01-01 | End: 2020-01-01 | Stop reason: HOSPADM

## 2020-01-01 RX ORDER — FUROSEMIDE 10 MG/ML
20 INJECTION INTRAMUSCULAR; INTRAVENOUS DAILY
Status: DISCONTINUED | OUTPATIENT
Start: 2020-01-01 | End: 2020-01-01 | Stop reason: HOSPADM

## 2020-01-01 RX ORDER — SODIUM POLYSTYRENE SULFONATE 15 G/60ML
15 SUSPENSION ORAL; RECTAL ONCE
Status: COMPLETED | OUTPATIENT
Start: 2020-01-01 | End: 2020-01-01

## 2020-01-01 RX ORDER — PROPOFOL 10 MG/ML
INJECTION, EMULSION INTRAVENOUS
Status: DISCONTINUED
Start: 2020-01-01 | End: 2020-01-01

## 2020-01-01 RX ORDER — ALENDRONATE SODIUM 70 MG/1
TABLET ORAL
Qty: 4 TABLET | Refills: 0 | Status: SHIPPED | OUTPATIENT
Start: 2020-01-01

## 2020-01-01 RX ORDER — ACETAMINOPHEN 650 MG/1
650 SUPPOSITORY RECTAL EVERY 6 HOURS PRN
Status: DISCONTINUED | OUTPATIENT
Start: 2020-01-01 | End: 2020-01-01 | Stop reason: HOSPADM

## 2020-01-01 RX ORDER — ETOMIDATE 2 MG/ML
16 INJECTION INTRAVENOUS ONCE
Status: COMPLETED | OUTPATIENT
Start: 2020-01-01 | End: 2020-01-01

## 2020-01-01 RX ORDER — METHYLPREDNISOLONE SODIUM SUCCINATE 40 MG/ML
40 INJECTION, POWDER, LYOPHILIZED, FOR SOLUTION INTRAMUSCULAR; INTRAVENOUS EVERY 6 HOURS
Status: DISCONTINUED | OUTPATIENT
Start: 2020-01-01 | End: 2020-01-01 | Stop reason: HOSPADM

## 2020-01-01 RX ORDER — METHYLPREDNISOLONE SODIUM SUCCINATE 125 MG/2ML
125 INJECTION, POWDER, LYOPHILIZED, FOR SOLUTION INTRAMUSCULAR; INTRAVENOUS ONCE
Status: COMPLETED | OUTPATIENT
Start: 2020-01-01 | End: 2020-01-01

## 2020-01-01 RX ORDER — FUROSEMIDE 10 MG/ML
INJECTION INTRAMUSCULAR; INTRAVENOUS
Status: DISPENSED
Start: 2020-01-01 | End: 2020-01-01

## 2020-01-01 RX ORDER — METOPROLOL SUCCINATE 100 MG/1
100 TABLET, EXTENDED RELEASE ORAL DAILY
Status: DISCONTINUED | OUTPATIENT
Start: 2020-01-01 | End: 2020-01-01 | Stop reason: HOSPADM

## 2020-01-01 RX ORDER — SUCCINYLCHOLINE/SOD CL,ISO/PF 100 MG/5ML
50 SYRINGE (ML) INTRAVENOUS ONCE
Status: COMPLETED | OUTPATIENT
Start: 2020-01-01 | End: 2020-01-01

## 2020-01-01 RX ORDER — PROPOFOL 10 MG/ML
20 INJECTION, EMULSION INTRAVENOUS ONCE
Status: COMPLETED | OUTPATIENT
Start: 2020-01-01 | End: 2020-01-01

## 2020-01-01 RX ORDER — 0.9 % SODIUM CHLORIDE 0.9 %
1000 INTRAVENOUS SOLUTION INTRAVENOUS ONCE
Status: COMPLETED | OUTPATIENT
Start: 2020-01-01 | End: 2020-01-01

## 2020-01-01 RX ORDER — ONDANSETRON 2 MG/ML
4 INJECTION INTRAMUSCULAR; INTRAVENOUS EVERY 6 HOURS PRN
Status: DISCONTINUED | OUTPATIENT
Start: 2020-01-01 | End: 2020-01-01 | Stop reason: HOSPADM

## 2020-01-01 RX ORDER — SODIUM CHLORIDE 0.9 % (FLUSH) 0.9 %
10 SYRINGE (ML) INJECTION EVERY 12 HOURS SCHEDULED
Status: DISCONTINUED | OUTPATIENT
Start: 2020-01-01 | End: 2020-01-01 | Stop reason: HOSPADM

## 2020-01-01 RX ORDER — DOXYCYCLINE HYCLATE 100 MG
TABLET ORAL
COMMUNITY
Start: 2020-01-01 | End: 2020-01-01 | Stop reason: ALTCHOICE

## 2020-01-01 RX ORDER — 0.9 % SODIUM CHLORIDE 0.9 %
20 INTRAVENOUS SOLUTION INTRAVENOUS ONCE
Status: COMPLETED | OUTPATIENT
Start: 2020-01-01 | End: 2020-01-01

## 2020-01-01 RX ORDER — SODIUM CHLORIDE 9 MG/ML
INJECTION, SOLUTION INTRAVENOUS CONTINUOUS
Status: DISCONTINUED | OUTPATIENT
Start: 2020-01-01 | End: 2020-01-01

## 2020-01-01 RX ORDER — PROPOFOL 10 MG/ML
10 INJECTION, EMULSION INTRAVENOUS
Status: DISCONTINUED | OUTPATIENT
Start: 2020-01-01 | End: 2020-01-01

## 2020-01-01 RX ORDER — POLYETHYLENE GLYCOL 3350 17 G/17G
17 POWDER, FOR SOLUTION ORAL DAILY PRN
Status: DISCONTINUED | OUTPATIENT
Start: 2020-01-01 | End: 2020-01-01 | Stop reason: HOSPADM

## 2020-01-01 RX ORDER — PANTOPRAZOLE SODIUM 40 MG/1
TABLET, DELAYED RELEASE ORAL
Qty: 30 TABLET | Refills: 5 | Status: SHIPPED | OUTPATIENT
Start: 2020-01-01

## 2020-01-01 RX ORDER — METOPROLOL TARTRATE 5 MG/5ML
5 INJECTION INTRAVENOUS
Status: DISCONTINUED | OUTPATIENT
Start: 2020-01-01 | End: 2020-01-01

## 2020-01-01 RX ORDER — SUCCINYLCHOLINE/SOD CL,ISO/PF 100 MG/5ML
SYRINGE (ML) INTRAVENOUS
Status: COMPLETED
Start: 2020-01-01 | End: 2020-01-01

## 2020-01-01 RX ORDER — 0.9 % SODIUM CHLORIDE 0.9 %
1000 INTRAVENOUS SOLUTION INTRAVENOUS ONCE
Status: DISCONTINUED | OUTPATIENT
Start: 2020-01-01 | End: 2020-01-01

## 2020-01-01 RX ADMIN — SODIUM POLYSTYRENE SULFONATE 15 G: 15 SUSPENSION ORAL; RECTAL at 11:18

## 2020-01-01 RX ADMIN — METOPROLOL SUCCINATE 100 MG: 100 TABLET, EXTENDED RELEASE ORAL at 08:40

## 2020-01-01 RX ADMIN — Medication 10 ML: at 08:40

## 2020-01-01 RX ADMIN — FAMOTIDINE 20 MG: 10 INJECTION INTRAVENOUS at 08:39

## 2020-01-01 RX ADMIN — Medication 50 MG: at 04:53

## 2020-01-01 RX ADMIN — NITROGLYCERIN 1 INCH: 20 OINTMENT TOPICAL at 06:11

## 2020-01-01 RX ADMIN — NITROGLYCERIN 1 INCH: 20 OINTMENT TOPICAL at 15:59

## 2020-01-01 RX ADMIN — FUROSEMIDE 20 MG: 10 INJECTION, SOLUTION INTRAMUSCULAR; INTRAVENOUS at 10:09

## 2020-01-01 RX ADMIN — ACETAMINOPHEN 650 MG: 325 TABLET ORAL at 14:53

## 2020-01-01 RX ADMIN — FAMOTIDINE 20 MG: 10 INJECTION INTRAVENOUS at 20:58

## 2020-01-01 RX ADMIN — FUROSEMIDE 20 MG: 10 INJECTION, SOLUTION INTRAMUSCULAR; INTRAVENOUS at 08:40

## 2020-01-01 RX ADMIN — NITROGLYCERIN 1 INCH: 20 OINTMENT TOPICAL at 23:47

## 2020-01-01 RX ADMIN — METHYLPREDNISOLONE SODIUM SUCCINATE 40 MG: 40 INJECTION, POWDER, FOR SOLUTION INTRAMUSCULAR; INTRAVENOUS at 09:09

## 2020-01-01 RX ADMIN — METHYLPREDNISOLONE SODIUM SUCCINATE 40 MG: 40 INJECTION, POWDER, FOR SOLUTION INTRAMUSCULAR; INTRAVENOUS at 21:36

## 2020-01-01 RX ADMIN — METHYLPREDNISOLONE SODIUM SUCCINATE 40 MG: 40 INJECTION, POWDER, FOR SOLUTION INTRAMUSCULAR; INTRAVENOUS at 20:58

## 2020-01-01 RX ADMIN — SODIUM CHLORIDE 500 ML: 9 INJECTION, SOLUTION INTRAVENOUS at 05:04

## 2020-01-01 RX ADMIN — ENOXAPARIN SODIUM 40 MG: 40 INJECTION SUBCUTANEOUS at 10:08

## 2020-01-01 RX ADMIN — FAMOTIDINE 20 MG: 10 INJECTION INTRAVENOUS at 21:36

## 2020-01-01 RX ADMIN — METOPROLOL TARTRATE 5 MG: 5 INJECTION INTRAVENOUS at 14:53

## 2020-01-01 RX ADMIN — ENOXAPARIN SODIUM 40 MG: 40 INJECTION SUBCUTANEOUS at 08:39

## 2020-01-01 RX ADMIN — SODIUM CHLORIDE 20 ML: 9 INJECTION, SOLUTION INTRAVENOUS at 13:00

## 2020-01-01 RX ADMIN — FAMOTIDINE 20 MG: 10 INJECTION INTRAVENOUS at 10:08

## 2020-01-01 RX ADMIN — METOPROLOL TARTRATE 5 MG: 5 INJECTION INTRAVENOUS at 15:53

## 2020-01-01 RX ADMIN — METOPROLOL TARTRATE 5 MG: 5 INJECTION INTRAVENOUS at 10:21

## 2020-01-01 RX ADMIN — NITROGLYCERIN 1 INCH: 20 OINTMENT TOPICAL at 21:35

## 2020-01-01 RX ADMIN — NITROGLYCERIN 1 INCH: 20 OINTMENT TOPICAL at 04:49

## 2020-01-01 RX ADMIN — FAMOTIDINE 20 MG: 10 INJECTION INTRAVENOUS at 10:50

## 2020-01-01 RX ADMIN — PROPOFOL 20 MCG/KG/MIN: 10 INJECTION, EMULSION INTRAVENOUS at 05:09

## 2020-01-01 RX ADMIN — METHYLPREDNISOLONE SODIUM SUCCINATE 40 MG: 40 INJECTION, POWDER, FOR SOLUTION INTRAMUSCULAR; INTRAVENOUS at 15:52

## 2020-01-01 RX ADMIN — METHYLPREDNISOLONE SODIUM SUCCINATE 40 MG: 40 INJECTION, POWDER, FOR SOLUTION INTRAMUSCULAR; INTRAVENOUS at 17:36

## 2020-01-01 RX ADMIN — NITROGLYCERIN 1 INCH: 20 OINTMENT TOPICAL at 18:45

## 2020-01-01 RX ADMIN — METHYLPREDNISOLONE SODIUM SUCCINATE 40 MG: 40 INJECTION, POWDER, FOR SOLUTION INTRAMUSCULAR; INTRAVENOUS at 15:59

## 2020-01-01 RX ADMIN — NITROGLYCERIN 1 INCH: 20 OINTMENT TOPICAL at 14:53

## 2020-01-01 RX ADMIN — PROPOFOL 30 MCG/KG/MIN: 10 INJECTION, EMULSION INTRAVENOUS at 10:52

## 2020-01-01 RX ADMIN — FUROSEMIDE 20 MG: 10 INJECTION, SOLUTION INTRAMUSCULAR; INTRAVENOUS at 10:51

## 2020-01-01 RX ADMIN — FAMOTIDINE 20 MG: 10 INJECTION INTRAVENOUS at 21:01

## 2020-01-01 RX ADMIN — ENOXAPARIN SODIUM 40 MG: 40 INJECTION SUBCUTANEOUS at 10:52

## 2020-01-01 RX ADMIN — METHYLPREDNISOLONE SODIUM SUCCINATE 125 MG: 125 INJECTION, POWDER, FOR SOLUTION INTRAMUSCULAR; INTRAVENOUS at 11:18

## 2020-01-01 RX ADMIN — SODIUM CHLORIDE 1000 ML: 9 INJECTION, SOLUTION INTRAVENOUS at 09:28

## 2020-01-01 RX ADMIN — METHYLPREDNISOLONE SODIUM SUCCINATE 40 MG: 40 INJECTION, POWDER, FOR SOLUTION INTRAMUSCULAR; INTRAVENOUS at 03:40

## 2020-01-01 RX ADMIN — Medication 10 ML: at 09:00

## 2020-01-01 RX ADMIN — NITROGLYCERIN 1 INCH: 20 OINTMENT TOPICAL at 08:39

## 2020-01-01 RX ADMIN — Medication 10 ML: at 10:09

## 2020-01-01 RX ADMIN — METOPROLOL TARTRATE 5 MG: 5 INJECTION INTRAVENOUS at 12:05

## 2020-01-01 RX ADMIN — NITROGLYCERIN 1 INCH: 20 OINTMENT TOPICAL at 12:05

## 2020-01-01 RX ADMIN — METHYLPREDNISOLONE SODIUM SUCCINATE 40 MG: 40 INJECTION, POWDER, FOR SOLUTION INTRAMUSCULAR; INTRAVENOUS at 04:49

## 2020-01-01 RX ADMIN — METHYLPREDNISOLONE SODIUM SUCCINATE 40 MG: 40 INJECTION, POWDER, FOR SOLUTION INTRAMUSCULAR; INTRAVENOUS at 21:01

## 2020-01-01 RX ADMIN — Medication 10 ML: at 21:04

## 2020-01-01 RX ADMIN — NITROGLYCERIN 1 INCH: 20 OINTMENT TOPICAL at 21:01

## 2020-01-01 RX ADMIN — Medication 10 ML: at 21:35

## 2020-01-01 RX ADMIN — ETOMIDATE 16 MG: 2 INJECTION INTRAVENOUS at 04:50

## 2020-01-01 RX ADMIN — IOPAMIDOL 100 ML: 612 INJECTION, SOLUTION INTRAVENOUS at 15:18

## 2020-01-01 RX ADMIN — METHYLPREDNISOLONE SODIUM SUCCINATE 40 MG: 40 INJECTION, POWDER, FOR SOLUTION INTRAMUSCULAR; INTRAVENOUS at 03:06

## 2020-01-01 RX ADMIN — METHYLPREDNISOLONE SODIUM SUCCINATE 40 MG: 40 INJECTION, POWDER, FOR SOLUTION INTRAMUSCULAR; INTRAVENOUS at 10:08

## 2020-01-01 RX ADMIN — NITROGLYCERIN 1 INCH: 20 OINTMENT TOPICAL at 03:06

## 2020-01-01 RX ADMIN — METOPROLOL TARTRATE 5 MG: 5 INJECTION INTRAVENOUS at 18:44

## 2020-01-01 RX ADMIN — Medication 10 ML: at 21:02

## 2020-01-01 ASSESSMENT — ENCOUNTER SYMPTOMS
COUGH: 1
BACK PAIN: 0
SHORTNESS OF BREATH: 1
SHORTNESS OF BREATH: 1
NAUSEA: 0
DIARRHEA: 0
GASTROINTESTINAL NEGATIVE: 1
VOICE CHANGE: 0
ABDOMINAL DISTENTION: 0
ABDOMINAL PAIN: 0
EYE DISCHARGE: 0
SHORTNESS OF BREATH: 1
COUGH: 1
CHEST TIGHTNESS: 0
ABDOMINAL PAIN: 0
CHEST TIGHTNESS: 0
SORE THROAT: 0
DIARRHEA: 0
VOMITING: 0
RHINORRHEA: 0
WHEEZING: 1
TROUBLE SWALLOWING: 0
SHORTNESS OF BREATH: 1
SORE THROAT: 0
PHOTOPHOBIA: 0
COUGH: 1
COUGH: 1
NAUSEA: 0
ABDOMINAL PAIN: 0
COUGH: 0
SHORTNESS OF BREATH: 1
SINUS PRESSURE: 0
WHEEZING: 0
EYE ITCHING: 0
SORE THROAT: 0
WHEEZING: 1
CHEST TIGHTNESS: 0
EYE DISCHARGE: 0
VOMITING: 0
VOMITING: 0

## 2020-01-01 ASSESSMENT — PAIN SCALES - GENERAL
PAINLEVEL_OUTOF10: 0

## 2020-01-01 ASSESSMENT — PULMONARY FUNCTION TESTS
PIF_VALUE: 18
PIF_VALUE: 16
PIF_VALUE: 10
PIF_VALUE: 27
PIF_VALUE: 16
PIF_VALUE: 10
PIF_VALUE: 20
PIF_VALUE: 28
PIF_VALUE: 19
PIF_VALUE: 20
PIF_VALUE: 32
PIF_VALUE: 17
PIF_VALUE: 20
PIF_VALUE: 18

## 2020-01-24 NOTE — PROGRESS NOTES
IND N/A 8/6/2018    COLONOSCOPY performed by Ridge Dodd MD at [address] Left 12/16/2016    [facility] B ELISHA BANG      THYROIDECTOMY, PARTIAL  11/18/14    PATRICIA CHACON MD    UPPER GASTROINTESTINAL ENDOSCOPY  7/13/15    w/bx      Current Outpatient Medications   Medication Sig Dispense Refill    doxycycline hyclate (VIBRA-TABS) 100 MG tablet TAKE ONE TABLET BY MOUTH EVERY 12 HOURS daily      mirtazapine (REMERON) 15 MG tablet Take 1 tablet by mouth nightly 30 tablet 5    alendronate (FOSAMAX) 70 MG tablet TAKE 1 TABLET BY MOUTH EVERY 7 DAYS 4 tablet 0    pantoprazole (PROTONIX) 40 MG tablet TAKE ONE TABLET BY MOUTH EVERY DAY 30 tablet 0    magnesium oxide (MAG-OX) 400 MG tablet Take 400 mg by mouth daily      cetirizine (ZYRTEC) 10 MG tablet Take 1 tablet by mouth daily 90 tablet 1    ondansetron (ZOFRAN-ODT) 4 MG disintegrating tablet Take 1 tablet by mouth 3 times daily as needed for Nausea or Vomiting 21 tablet 0    metoprolol succinate (TOPROL XL) 100 MG extended release tablet TAKE ONE TABLET BY MOUTH DAILY  3    albuterol sulfate  (90 Base) MCG/ACT inhaler Inhale 2 puffs into the lungs every 6 hours as needed for Wheezing 1 Inhaler 3    acetaminophen (APAP EXTRA STRENGTH) 500 MG tablet Take 1 tablet by mouth every 6 hours as needed for Pain 16 tablet 0    Multiple Vitamin (MULTI VITAMIN DAILY PO) Take by mouth      traZODone (DESYREL) 50 MG tablet Take 1 tablet by mouth nightly as needed for Sleep 30 tablet 0     No current facility-administered medications for this visit.         Review of Systems:   General ROS: fatigue  Respiratory ROS: chronic cough  Cardiovascular ROS: no chest pain or dyspnea on exertion  Gastrointestinal ROS: alternating diarrhea and constipation  Genito-Urinary ROS: no dysuria, trouble voiding  Musculoskeletal ROS: knee pain and leg weakness  Neurological ROS: negative for - behavioral changes, memory loss, numbness/tingling, tremors or weakness    In general patient otherwise reports feeling well. Physical Exam:  BP (!) 100/56 (Site: Right Upper Arm)   Pulse 88   Ht 5' (1.524 m)   Wt 97 lb 9.6 oz (44.3 kg)   LMP  (LMP Unknown)   SpO2 96%   Breastfeeding No   BMI 19.06 kg/m²     Gen: thin, Well, NAD, Alert, Oriented x 3, patient VERY [place]   HEENT: EOMI, eyes clear, MMM  Skin: without rash or jaundice  Neck: no significant lymphadenopathy or thyromegaly  Lungs: CTA B w/out Rales/Wheezes/Rhonchi, Good respiratory effort   Heart: 2/6 HSM LSB  Abdomen: Soft NT/ND, w/out R/G, w/ +BSx4  Ext: no edema       Lab Results   Component Value Date    WBC 12.0 (H) 12/18/2019    HGB 9.5 (L) 12/18/2019    HCT 29.8 (L) 12/18/2019     12/18/2019    CHOL 225 (H) 01/24/2019    TRIG 152 01/24/2019    HDL 54 01/24/2019    ALT 6 12/05/2019    AST 11 12/05/2019     12/18/2019    K 4.5 12/18/2019     12/18/2019    CREATININE 1.08 (H) 12/18/2019    BUN 22 12/18/2019    CO2 20 12/18/2019    TSH 1.930 06/04/2019    INR 1.1 12/05/2019    LABA1C 5.6 04/24/2018         A&P   Diagnosis Orders   1. COPD exacerbation ([address]     2. Chronic pansinusitis     3. Supraventricular tachycardia ([address]     4. Benign paroxysmal positional vertigo, unspecified laterality     5. Thrombocytosis (HCC)     6. Lung mass     7. Atrial fibrillation, unspecified type ([address]     8. MVP (mitral valve prolapse)     9. Fatigue, unspecified type  TSH without Reflex    Comprehensive Metabolic Panel    CBC   10. Hypomagnesemia  Magnesium   11.  Appetite absent  mirtazapine (REMERON) 15 MG tablet       Labs as ordered    No anticoag given chronic anemia    F/u with hematology    F/u with pulm  Will have PET scan regarding lung nodules    F/u with cardiology    F/u with ENT    Pt drinking glucerna    Start remeron    Note forwarded to Care Coordinator       Jose Judd MD

## 2020-01-28 NOTE — TELEPHONE ENCOUNTER
When speaking to  about lab results he stated that pharmacy was not filling Remeron? Called [facility], pharmacist states that he has never heard of this being prescribed for Appetite Absents? In the same sentence he mentions it causes weight gain! I asked that he please fill for weight gain! He is asking me to send msg to you!

## 2020-01-28 NOTE — TELEPHONE ENCOUNTER
I prescribe it to elderly patients with chronic illness all of the time. She has associated mild depression and low appetite. This is a common prescribing practice.

## 2020-01-30 NOTE — TELEPHONE ENCOUNTER
Pt  called today stating that insurance does not cover albuterol HFA and the will like to try combivent. Prescription to be sent to [facility]. Please advice.

## 2020-02-18 NOTE — PROGRESS NOTES
No pertinent family history. Social History     Socioeconomic History    Marital status:      Spouse name: Not on file    Number of children: Not on file    Years of education: Not on file    Highest education level: Not on file   Occupational History    Not on file   Social Needs    Financial resource strain: Not on file    Food insecurity:     Worry: Not on file     Inability: Not on file    Transportation needs:     Medical: Not on file     Non-medical: Not on file   Tobacco Use    Smoking status: Former Smoker     Packs/day: 0.25     Years: 8.00     Pack years: 2.00     Types: Cigarettes     Last attempt to quit: 1982     Years since quittin.1    Smokeless tobacco: Never Used    Tobacco comment: quit 34 years ago   Substance and Sexual Activity    Alcohol use: No     Alcohol/week: 0.0 standard drinks    Drug use: No    Sexual activity: Never   Lifestyle    Physical activity:     Days per week: Not on file     Minutes per session: Not on file    Stress: Not on file   Relationships    Social connections:     Talks on phone: Not on file     Gets together: Not on file     Attends [employer] service: Not on file     Active member of club or organization: Not on file     Attends meetings of clubs or organizations: Not on file     Relationship status: Not on file    Intimate partner violence:     Fear of current or ex partner: Not on file     Emotionally abused: Not on file     Physically abused: Not on file     Forced sexual activity: Not on file   Other Topics Concern    Not on file   Social History Narrative    Not on file         Review of Systems   Constitutional: Negative for chills, diaphoresis, fatigue and fever. HENT: Negative for congestion, mouth sores, nosebleeds, postnasal drip, rhinorrhea, sinus pressure, sneezing, sore throat, trouble swallowing and voice change. Eyes: Negative for discharge, itching and visual disturbance.    Respiratory: Positive for cough, shortness of breath and wheezing. Negative for chest tightness. Cardiovascular: Negative for chest pain, palpitations and leg swelling. Gastrointestinal: Negative for abdominal pain, diarrhea, nausea and vomiting. Genitourinary: Negative for difficulty urinating and hematuria. Musculoskeletal: Negative for arthralgias, joint swelling and myalgias. Skin: Negative for rash. Allergic/Immunologic: Negative for environmental allergies and food allergies. Neurological: Negative for dizziness, tremors, weakness and headaches. Psychiatric/Behavioral: Negative for behavioral problems and sleep disturbance.         :     Vitals:    02/18/20 1010   BP: 114/60   Pulse: 64   Resp: 16   Temp: 97.3 °F (36.3 °C)   TempSrc: Temporal   SpO2: 98%   Weight: 93 lb 6.4 oz (42.4 kg)   Height: 5' (1.524 m)     Wt Readings from Last 3 Encounters:   02/18/20 93 lb 6.4 oz (42.4 kg)   01/24/20 97 lb 9.6 oz (44.3 kg)   12/17/19 105 lb 6.4 oz (47.8 kg)         Physical Exam  Constitutional:       General: She is not in acute distress. Appearance: She is well-developed. She is not diaphoretic. HENT:      Head: Normocephalic and atraumatic. Nose: Nose normal.   Eyes:      Pupils: Pupils are equal, round, and reactive to light. Neck:      Thyroid: No thyromegaly. Vascular: No JVD. Trachea: No tracheal deviation. Cardiovascular:      Rate and Rhythm: Normal rate and regular rhythm. Heart sounds: No murmur. No friction rub. No gallop. Pulmonary:      Effort: No respiratory distress. Breath sounds: No wheezing or rales. Chest:      Chest wall: No tenderness. Abdominal:      General: There is no distension. Tenderness: There is no abdominal tenderness. There is no rebound. Musculoskeletal: Normal range of motion. Lymphadenopathy:      Cervical: No cervical adenopathy. Skin:     General: Skin is warm and dry.    Neurological:      Mental Status: She is alert and oriented to person, place, and time. Coordination: Coordination normal.         Current Outpatient Medications   Medication Sig Dispense Refill    albuterol-ipratropium (COMBIVENT RESPIMAT)  MCG/ACT AERS inhaler Inhale 1 puff into the lungs every 6 hours as needed for Wheezing 1 Inhaler 3    mirtazapine (REMERON) 15 MG tablet Take 1 tablet by mouth nightly 30 tablet 5    alendronate (FOSAMAX) 70 MG tablet TAKE 1 TABLET BY MOUTH EVERY 7 DAYS 4 tablet 0    pantoprazole (PROTONIX) 40 MG tablet TAKE ONE TABLET BY MOUTH EVERY DAY 30 tablet 0    magnesium oxide (MAG-OX) 400 MG tablet Take 400 mg by mouth daily      cetirizine (ZYRTEC) 10 MG tablet Take 1 tablet by mouth daily 90 tablet 1    ondansetron (ZOFRAN-ODT) 4 MG disintegrating tablet Take 1 tablet by mouth 3 times daily as needed for Nausea or Vomiting 21 tablet 0    metoprolol succinate (TOPROL XL) 100 MG extended release tablet TAKE ONE TABLET BY MOUTH DAILY  3    albuterol sulfate  (90 Base) MCG/ACT inhaler Inhale 2 puffs into the lungs every 6 hours as needed for Wheezing 1 Inhaler 3    acetaminophen (APAP EXTRA STRENGTH) 500 MG tablet Take 1 tablet by mouth every 6 hours as needed for Pain 16 tablet 0    Multiple Vitamin (MULTI VITAMIN DAILY PO) Take by mouth      traZODone (DESYREL) 50 MG tablet Take 1 tablet by mouth nightly as needed for Sleep 30 tablet 0     No current facility-administered medications for this visit. Results for orders placed during the hospital encounter of 12/05/19   XR CHEST STANDARD (2 VW)    Narrative XR CHEST (2 VW)    Exam Date/Time:  12/18/2019 8:00 AM  Clinical History:   lead positioning   Comparison:  12/17/2019. RESULT:     Lines, tubes, and devices:  Left transvenous pacemaker with leads in the region of the right atrium and right ventricle, unchanged. Lungs and pleura:  Bibasilar opacities, right greater than left, unchanged.  Small bilateral pleural effusions or blunting of the costophrenic angles, RADIOGRAPH, SAME DATE. FINDINGS: Patient leaning to right. Osseous structures intact. Cardiopericardial silhouette normal. In the interval, pulmonary vasculature now indistinct. Lungs remain hyperexpanded. Ill-defined areas of increased opacity are found bilaterally in the mid   and lower lungs, with bilateral blunting of the costophrenic angles and thickening of the minor fissure. Impression BILATERAL ATELECTASIS/PNEUMONIA VERSUS EDEMA AREA SMALL BILATERAL PLEURAL EFFUSIONS. EMPHYSEMA. XR CHEST PORTABLE    Narrative XR CHEST PORTABLE : 12/5/2019    CLINICAL HISTORY:  cough vomiting . COMPARISON: Two-view chest 11/19/2019 and chest CT 7/17/2015. A portable upright AP radiograph of the chest was obtained. FINDINGS:    Nodular and masslike opacities of the lung bases appear more prominent when compared to the prior study, with most likely possibilities of malignancy, infection or metastatic disease. The largest measuring approximately 4 cm within the medial lung bases. There is no cardiomegaly, significant pleural effusion, vascular congestion, pneumothorax, or displaced fractures identified. Impression INCREASING NODULAR AND MASSLIKE OPACITIES OF THE LUNG BASES. MOST LIKELY POSSIBILITIES ARE MALIGNANCY, INFECTION OR METASTATIC DISEASE. Results for orders placed during the hospital encounter of 07/17/15   CT Chest W Contrast    Narrative EXAMINATION CT THORAX WITH CONTRAST     CLINICAL INFORMATION WEIGHT LOSS, R/O MALIGNANCY. COMPARISON 2/25/15     TECHNIQUE Spiral scans with 100 mL Optiray-320 IV. Multiplanar 2-D  reconstructions. FINDINGS There are scattered bilateral lower lung sub-pleural  sub-solid oval and discoid opacities which were not apparent on prior  examination performed approximately 5 months ago and have CT  characteristics favoring atelectasis rather than masses or nodules. There is trace right pleural effusion.  There is no thoracic adenopathy. Cardiac size and pulmonary vascularity are within normal limits. There  is trace pericardial effusion. The esophagus is nonspecific with some  gas and fluid density seen in the upper esophagus which could be  related to reflux or dysmotility. There is a small sliding-type hernia. There are old left rib fractures. There are no suspicious osseous  lesions. IMPRESSION NEW BILATERAL LOWER LOBE SUBPLEURAL LUNG OPACITIES WITH CT  APPEARANCE OF MULTIFOCAL ATELECTASIS^ NEOPLASTIC LESIONS ARE CONSIDERED  LESS LIKELY. FOLLOWUP CT SCAN MAY BE OBTAINED TO ENSURE CLEARING. TRACE  RIGHT PLEURAL EFFUSION. TRACE PERICARDIAL EFFUSION. ABNORMAL FINDING OF  FLUID DENSITY IN THE UPPER ESOPHAGUS--CONSIDER DYSMOTILITY OR REFLUX. UNDERLYING ESOPHAGEAL LESION IS NOT EXCLUDED. Leonila Pleitez M.D. Released By- Taras Maldonado M.D. Released Date Time- 07/22/15 0858   This document has been electronically signed. ------------------------------------------------------------------------------   ]  Narrative   The EXAMINATION: CT scan of the chest with contrast (pulmonary embolism protocol)       INDICATION: Chest pain and shortness of breath.       COMPARISON: None       TECHNIQUE: Helical CT was performed through the chest utilizing 100 cc of Isovue-370 intravenous contrast.  Images were obtained with bolus tracking in order to opacify the pulmonary arteries.  There is no comparison available. Both MIP and 3D volume    rendered reconstructions were performed.       FINDINGS: There are no findings of central, proximal or segmental pulmonary emboli. .        There are bilateral multiple pulmonary nodules predominantly within the bases of both left and right lung parenchyma. There is a 7 mm nodule within the base anterior aspect of the right lower lobe.    The largest nodule, mass within the right lower lobe is at the posterior aspect measuring 4.0 x 3.0 x 3.6 in the request follow-up CT chest and if persistent lung density or mass then may need biopsy or PET scan. - CT CHEST WO CONTRAST; Future    2. Chronic obstructive pulmonary disease, unspecified COPD type ([address]  She is having short of breath with minimal exertion and occasional wheezing. She is on albuterol HFA every 6 hours, Combivent Respimat 1 puff every 6 hourly as before    3. Recurrent sinusitis  According to  patient has recurrent sinus infection and postnasal drip which causing her cough. She is not on any antibiotic at this time she does have a complaint of rhinorrhea and postnasal drip. Return in about 2 weeks (around 3/3/2020) for Ct chest f/u.       Leslie Gusman MD

## 2020-03-04 PROBLEM — R91.8 MULTIPLE LUNG NODULES ON CT: Status: ACTIVE | Noted: 2020-01-01

## 2020-03-04 NOTE — PROGRESS NOTES
Subjective:     Roseanna Balderas is a 69 y.o. female who complains today of:     Chief Complaint   Patient presents with    Follow-up     two week f/u for CT results. HPI  She came after CT chest done regarding multiple cavitary nodules/mass. She was treated with IV Zosyn. With antibiotic patient still continues to have a cough with green mucus but was feeling better and she remain off antibiotic. She had follow-up CT chest done and came for office follow-up. She has no fever or chills. She has no hemoptysis. She has not been eating well and she is still persistent cough and losing weight. She is having short of breath with minimal exertion and off-and-on wheezing. She is using Combivent Respimat and albuterol HFA. She has no complaint of nausea vomiting or diarrhea. Her appetite is poor. She does have chronic rhinorrhea and postnasal drip. Allergies:  Azithromycin; Thiopental; and Asa [aspirin]  Past Medical History:   Diagnosis Date    Atrial fibrillation (HCC)     Chronic anemia     Chronic rhinitis     DR. NAPIER    Chronic sinusitis     Colon polyp 06/03/2015    DR Abi Mariee    Diverticulosis of colon (without mention of hemorrhage) 06/03/2015    DR Abi Mariee    Lung disease     Lung mass     was suspicious for malignancy, but path negative.  MVP (mitral valve prolapse) 5/28/2014    Recurrent sinusitis     DR. NAPIER    Thrombocytosis ([facility] [address]     Thyroid nodule      Past Surgical History:   Procedure Laterality Date    BRONCHOSCOPY      CATARACT REMOVAL WITH IMPLANT Left 03/18/16    [facility] Shahbaz Wolff MD    CATARACT REMOVAL WITH IMPLANT Right 04/22/16    [facility] Shahbaz Wolff MD    COLONOSCOPY  06/03/2015    DR Abi Mariee - DIVERTICULOSIS, POLYPS    HYSTERECTOMY      PACEMAKER INSERTION  12/2019    [place] COLORECTAL SCRN; HI RISK IND N/A 8/6/2018    COLONOSCOPY performed by Shelia Curiel MD at [address] [place] [address] Left 12/16/2016    Ahsan Castillo MD      Resp: 16   Temp: 98.6 °F (37 °C)   TempSrc: Temporal   SpO2: 94%   Weight: 90 lb (40.8 kg)   Height: 5' (1.524 m)     Wt Readings from Last 3 Encounters:   03/04/20 90 lb (40.8 kg)   02/28/20 94 lb (42.6 kg)   02/18/20 93 lb 6.4 oz (42.4 kg)         Physical Exam    Current Outpatient Medications   Medication Sig Dispense Refill    pantoprazole (PROTONIX) 40 MG tablet TAKE ONE TABLET BY MOUTH EVERY DAY 30 tablet 5    mirtazapine (REMERON) 15 MG tablet Take 1 tablet by mouth nightly 30 tablet 5    alendronate (FOSAMAX) 70 MG tablet TAKE 1 TABLET BY MOUTH EVERY 7 DAYS 4 tablet 0    magnesium oxide (MAG-OX) 400 MG tablet Take 400 mg by mouth daily      cetirizine (ZYRTEC) 10 MG tablet Take 1 tablet by mouth daily 90 tablet 1    metoprolol succinate (TOPROL XL) 100 MG extended release tablet TAKE ONE TABLET BY MOUTH DAILY  3    Multiple Vitamin (MULTI VITAMIN DAILY PO) Take by mouth       No current facility-administered medications for this visit. Results for orders placed during the hospital encounter of 12/05/19   XR CHEST STANDARD (2 VW)    Narrative XR CHEST (2 VW)    Exam Date/Time:  12/18/2019 8:00 AM  Clinical History:   lead positioning   Comparison:  12/17/2019. RESULT:     Lines, tubes, and devices:  Left transvenous pacemaker with leads in the region of the right atrium and right ventricle, unchanged. Lungs and pleura:  Bibasilar opacities, right greater than left, unchanged. Small bilateral pleural effusions or blunting of the costophrenic angles, unchanged. No pneumothorax. Cardiomediastinal silhouette:  Stable cardiomediastinal silhouette. Aortic atherosclerotic calcification. Other:  No acute osseous findings. Impression No significant interval change from prior. XR CHEST STANDARD (2 VW)    Narrative EXAMINATION: Chest x-ray, 2 view    HISTORY: Pneumonia. COPD.     TECHNIQUE: Frontal and lateral views of the chest    COMPARISON: Chest x-ray from December 17, study. These are not demonstrated on the study from 3/26/2009, developed on the 10/24/2014 exam, improved on the 2/25/2015 exam and became worse on 7/17/2015 indicating a remitting relapsing course. The   nodules/masses are in a perivascular/peribronchial distribution. Differential includes granulomatosis with polyangiitis (formerly Wegener's granulomatosis), recurrent septic emboli, indolent neoplasm (e.g. lymphomatoid granulomatosis), eosinophilic granulomatosis with polyangiitis. Primary vasculitis less likely as   lesions are more solid masses rather than infiltration. Sarcoidosis considered unlikely due to lack of adenopathy. Metastatic disease is not excluded but the overall course compared to the previous studies is atypical.    There is no generalized interstitial lung disease or pleural effusions. Nonenlarged by size criteria mediastinal lymph nodes the largest in the right paratracheal region is fatty replaced and measures approximately 8 mm. Left-sided pacemaker/defibrillator is present with one lead in the right ventricle and the other the right atrium. Moderate atherosclerotic calcifications in the aortic arch and at the origin of the left coronary artery. Visualized bony structures are unremarkable. Impression Increasing size and number of cavitary mid to lower lung field masses which compared to the more remote studies have a relapsing remitting course. Differential described in detail above. Correlate with tissue sampling and culture if this has not already been performed.   ]  Results for orders placed during the hospital encounter of 07/17/15   CT Chest W Contrast    Narrative EXAMINATION CT THORAX WITH CONTRAST     CLINICAL INFORMATION WEIGHT LOSS, R/O MALIGNANCY. COMPARISON 2/25/15     TECHNIQUE Spiral scans with 100 mL Optiray-320 IV. Multiplanar 2-D  reconstructions.      FINDINGS There are scattered bilateral lower lung sub-pleural  sub-solid oval and discoid opacities

## 2020-03-13 NOTE — ED NOTES
Pt resting in bed with no signs of distress. Vitals stable at this time.  remains at bedside. Denies any needs or complaints at this time.      Yousuf Quintana RN  03/13/20 [id number]

## 2020-03-13 NOTE — ED PROVIDER NOTES
[facility] ED  eMERGENCYdEPARTMENT eNCOUnter      Pt Name: Fay Priest  MRN: [id number]  Umeshgfyomaira 1950  Date of evaluation: 3/13/2020  Inés Roy MD    CHIEF COMPLAINT           HPI  Fay Priest is a 69 y.o. female per chart review has a h/o afib, MVP, COPD presents to the sob, cough presents to the ED with weight loss, necrotizing vasculitis. Pt notes gradual onset, moderate, constant sob x 2 weeks. +Cough. Pt notes a 28 lb weight loss since this am.  Pt denies fever, cp, ab pain, dysuria, diarrhea. Pt had a CT scan 2/28/20 which showed increasing number of cavitary mid to lower lung field masses. Dr. 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 ROS  Review of Systems   Constitutional: Positive for unexpected weight change. Negative for activity change, chills and fever. HENT: Negative for ear pain and sore throat. Eyes: Negative for visual disturbance. Respiratory: Positive for cough and shortness of breath. Cardiovascular: Negative for chest pain, palpitations and leg swelling. Gastrointestinal: Negative for abdominal pain, diarrhea, nausea and vomiting. Genitourinary: Negative for dysuria. Musculoskeletal: Negative for back pain. Skin: Negative for rash. Neurological: Negative for dizziness and weakness. Except as noted above the remainder of the review of systems was reviewed and negative. PAST MEDICAL HISTORY     Past Medical History:   Diagnosis Date    Atrial fibrillation (HCC)     Chronic anemia     Chronic rhinitis     DR. NAPIER    Chronic sinusitis     Colon polyp 06/03/2015    DR Yary Sabillon    Diverticulosis of colon (without mention of hemorrhage) 06/03/2015    DR Yary Sabillon    Lung disease     Lung mass     was suspicious for malignancy, but path negative.  MVP (mitral valve prolapse) 5/28/2014    Recurrent sinusitis     DR. NAPIER    Thrombocytosis ([facility] [address]     Thyroid nodule          SURGICAL HISTORY       Past Surgical History:   Procedure Laterality Date    BRONCHOSCOPY      CATARACT REMOVAL WITH IMPLANT Left 03/18/16    [facility] Reva Almeida MD    CATARACT REMOVAL WITH IMPLANT Right 04/22/16    [facility] Reva Almeida MD    COLONOSCOPY  06/03/2015    DR Emmy Herbert - DIVERTICULOSIS, POLYPS    HYSTERECTOMY      PACEMAKER INSERTION  12/2019    [place] COLORECTAL SCRN; HI RISK IND N/A 8/6/2018    COLONOSCOPY performed by Jose Betts MD at [address] [place] [address] Left 12/16/2016    [facility] PEDRO LUIS TELLO MD      THYROIDECTOMY, PARTIAL  11/18/14    G OSWALDO BANG    UPPER GASTROINTESTINAL ENDOSCOPY  7/13/15    w/bx          CURRENTMEDICATIONS       Previous Medications    ALENDRONATE (FOSAMAX) 70 MG TABLET    TAKE 1 TABLET BY MOUTH EVERY 7 DAYS    CETIRIZINE (ZYRTEC) 10 MG TABLET    Take 1 tablet by mouth daily    MAGNESIUM OXIDE (MAG-OX) 400 MG TABLET    Take 400 mg by mouth daily    METOPROLOL SUCCINATE (TOPROL XL) 100 MG EXTENDED RELEASE TABLET    TAKE ONE TABLET BY MOUTH DAILY    MIRTAZAPINE (REMERON) 15 MG TABLET    Take 1 tablet by mouth nightly    MULTIPLE VITAMIN (MULTI VITAMIN DAILY PO)    Take by mouth    PANTOPRAZOLE (PROTONIX) 40 MG TABLET    TAKE ONE TABLET BY MOUTH EVERY DAY       ALLERGIES     Azithromycin; Thiopental; and Asa [aspirin]    FAMILY HISTORY     History reviewed. No pertinent family history.        SOCIAL HISTORY       Social History     Socioeconomic History    Marital status:      Spouse name: None    Number of children: None    Years of education: None    Highest education level: None   Occupational History    None   Social Needs    Financial resource strain: None    Food insecurity     Worry: None     Inability: None    Transportation needs     Medical: None     Non-medical: None   Tobacco Use    Smoking status: Former Smoker     Packs/day: 0.25     Years: 8.00     Pack years: 2.00     Types: Cigarettes     Last attempt to quit: 1982     Years since quittin.2    Smokeless tobacco: Never Used    Tobacco comment: quit 34 years ago   Substance and Sexual Activity    Alcohol use: No     Alcohol/week: 0.0 standard drinks    Drug use: No    Sexual activity: Never   Lifestyle    Physical activity     Days per week: None     Minutes per session: None    Stress: None   Relationships    Social connections     Talks on phone: None     Gets together: None     Attends Worship service: None     Active member of club or organization: None     Attends meetings of clubs or organizations: None     Relationship status: None    Intimate partner violence     Fear of current or ex partner: None     Emotionally abused: None     Physically abused: None     Forced sexual activity: None   Other Topics Concern    None   Social History Narrative    None         PHYSICAL EXAM       ED Triage Vitals [20 0815]   BP Temp Temp src Pulse Resp SpO2 Height Weight   95/63 97.6 °F (36.4 °C) -- 91 18 97 % 5' (1.524 m) 95 lb (43.1 kg)       Physical Exam  Vitals signs and nursing note reviewed. Constitutional:       Appearance: She is well-developed. HENT:      Head: Normocephalic. Right Ear: External ear normal.      Left Ear: External ear normal.   Eyes:      Conjunctiva/sclera: Conjunctivae normal.      Pupils: Pupils are equal, round, and reactive to light. Neck:      Musculoskeletal: Normal range of motion and neck supple. Cardiovascular:      Rate and Rhythm: Normal rate and regular rhythm. Heart sounds: Normal heart sounds. Pulmonary:      Effort: Pulmonary effort is normal.      Breath sounds: Normal breath sounds. Abdominal:      General: Bowel sounds are normal. There is no distension. Palpations: Abdomen is soft. Tenderness: There is no abdominal tenderness. Musculoskeletal: Normal range of motion. Skin:     General: Skin is warm and dry.    Neurological:      Mental Status: She is alert and

## 2020-04-09 PROBLEM — J96.01 ACUTE RESPIRATORY FAILURE WITH HYPOXIA (HCC): Status: ACTIVE | Noted: 2020-01-01

## 2020-04-09 NOTE — H&P
at [address] Left 12/16/2016    [facility] B ELISHA BANG      THYROIDECTOMY, PARTIAL  11/18/14    PATRICIA CHACON MD    UPPER GASTROINTESTINAL ENDOSCOPY  7/13/15    w/bx        Medications Prior to Admission:    Prior to Admission medications    Medication Sig Start Date End Date Taking? Authorizing Provider   pantoprazole (PROTONIX) 40 MG tablet TAKE ONE TABLET BY MOUTH EVERY DAY 2/24/20   Saida Perez MD   mirtazapine (REMERON) 15 MG tablet Take 1 tablet by mouth nightly 1/24/20   Saida Perez MD   alendronate (FOSAMAX) 70 MG tablet TAKE 1 TABLET BY MOUTH EVERY 7 DAYS 1/23/20   Saida Perez MD   magnesium oxide (MAG-OX) 400 MG tablet Take 400 mg by mouth daily    Historical Provider, MD   cetirizine (ZYRTEC) 10 MG tablet Take 1 tablet by mouth daily 9/14/19   Chago Magaña MD   metoprolol succinate (TOPROL XL) 100 MG extended release tablet TAKE ONE TABLET BY MOUTH DAILY 6/12/19   Historical Provider, MD   Multiple Vitamin (MULTI VITAMIN DAILY PO) Take by mouth    Historical Provider, MD       Allergies:  Azithromycin; Thiopental; and Asa [aspirin]    Social History:   TOBACCO:   reports that she quit smoking about 37 years ago. Her smoking use included cigarettes. She has a 2.00 pack-year smoking history. She has never used smokeless tobacco.  ETOH:   reports no history of alcohol use. Family History:   Unable to obtain    REVIEW OF SYSTEMS:  Ten systems reviewed and negative except for as above. Physical Exam:    Vitals: BP (!) 141/98   Pulse 70   Resp 16   Ht 5' (1.524 m)   Wt 100 lb (45.4 kg)   LMP  (LMP Unknown)   SpO2 100%   BMI 19.53 kg/m²   Physical Exam  Vitals signs and nursing note reviewed. Constitutional:       Appearance: She is ill-appearing. Comments: Intubated, sedated   HENT:      Head: Normocephalic and atraumatic. Eyes:      Conjunctiva/sclera: Conjunctivae normal.      Pupils: Pupils are equal, round, and reactive to light.    Neck: Musculoskeletal: Normal range of motion and neck supple. Cardiovascular:      Rate and Rhythm: Normal rate and regular rhythm. Pulmonary:      Effort: Pulmonary effort is normal.      Breath sounds: Normal breath sounds. Abdominal:      General: Abdomen is flat. Bowel sounds are normal.   Musculoskeletal:      Comments: Unable to assess   Skin:     General: Skin is warm and dry. Neurological:      Comments: Unable to assess, sedated, intubated           Recent Labs     20  1051   WBC 14.2*  --   --    HGB 7.2* 6.9* 8.4*     --   --      Recent Labs     2043 20  1051     --   --    K 3.9  --   --      --   --    CO2 23  --   --    BUN 63*  --   --    CREATININE 0.83 1.0 1.0   GLUCOSE 134*  --   --    *  --   --    *  --   --    BILITOT 0.4  --   --    ALKPHOS 68  --   --      Troponin T:   Recent Labs     20  0445   TROPONINI 0.091*     ABGs:   Lab Results   Component Value Date    PHART 7.464 2020    PO2ART 126 2020    FNX2YPA 40 2020     I-----------------------------------------------------------------   Xr Chest Portable    Result Date: 2020  Patient MRN: [id number] : 1950 Age:  69 years Gender: Female Order Date: 2020 4:45 AM. Exam: XR CHEST PORTABLE Number of Views: 1 Indication:  Shortness of breath status post intubation Comparison: 2019 Findings: Stable, prominent cardiomediastinal silhouette with an endotracheal tube with its distal tip approximately 3 cm superior to the diaz. Vascular calcifications thoracic aorta. No pneumothorax. Underlying abnormal reticulonodular lung interstitial pattern with superimposed airspace disease bilateral mid lungs and bilateral lower lobes with bilateral pleural effusions. No pneumothorax. Impression:  1.  Abnormal underlying reticulonodular interstitial pattern is nonspecific and could reflect typical versus atypical H04.419    Benign neoplasm of sigmoid colon D12.5    Chronic renal failure, stage 3 (moderate) ([facility]) N18.3    URI with cough and congestion J06.9    Diverticulitis of colon K57.32    COPD exacerbation ([facility]) J44.1    Sepsis ([facility]) A41.9    Atrial fibrillation ([facility]) I48.91    Multiple lung nodules on CT, with cavity.  r/o wagners disease R91.8    Wegener's granulomatosis (granulomatosis with polyangiitis) ([facility] M31.30    Clostridium difficile colitis A04.72    Flash pulmonary edema ([address] J81.0    Mitral regurgitation I34.0    Acute respiratory failure with hypoxia ([facility]) J96.01       Ruth Dupont MD  Admitting Hospitalist

## 2020-04-09 NOTE — PLAN OF CARE
Nutrition Problem: Inadequate oral intake  Intervention: Food and/or Nutrient Delivery: Continue current Tube Feeding(Continue with Standard with Fiber TF (Jevity 1.2) @ trophic rate of 20 ml/hr with 100 ml water flush every 6 hrs.  Monitor tolerance to EN, changed in propofol and adjust EN accordingly)  Nutritional Goals: initation and tolerance of EN

## 2020-04-09 NOTE — ED NOTES
Successful intubation. Size 7.0 ET.   Tube secured - 20 at the [address] [place] [address], RN  04/09/20 [facility]Th Floor [facility]  04/09/20 2495

## 2020-04-09 NOTE — ED PROVIDER NOTES
[facility] ED  eMERGENCY dEPARTMENT eNCOUnter      Pt Name: Miranda Hui  MRN: [id number]  [place] 1950  Date of evaluation: 4/9/2020  Provider: Efren Pepper MD    CHIEF COMPLAINT       Chief Complaint   Patient presents with    Shortness of Breath         HISTORY OF PRESENT ILLNESS   (Location/Symptom, Timing/Onset,Context/Setting, Quality, Duration, Modifying Factors, Severity)  Note limiting factors. Miranda Hui is a 69 y.o. female who presents to the emergency department for evaluation of difficulty breathing. Patient has extensive lung disease and spent the last 4 weeks at [facility] clinic for complications of respiratory problems. Reading through her notes from there the patient had cavitary lung lesions with multiple bacterial infections. She was tested for covert 3 or 4 times and tested negative. She was discharged from the [facility] clinic in the past 24 hours. She was able to give enough history that she felt fine at home this evening and went to bed. She woke up extremely short of breath and was incontinent. She is brought in by her  and respiratory distress. Her initial pulse ox reading was 70%. She had difficulty giving any extensive history because of her shortness of breath. No related fever. She has no chest pain. She has no abdominal pain. She does not wear oxygen at home. HPI    NursingNotes were reviewed. REVIEW OF SYSTEMS    (2-9 systems for level 4, 10 or more for level 5)     Review of Systems   Constitutional: Positive for fatigue. Negative for chills, diaphoresis and fever. HENT: Negative for congestion, ear pain, mouth sores and sore throat. Eyes: Negative for photophobia and discharge. Respiratory: Positive for shortness of breath. Negative for cough, chest tightness and wheezing. Cardiovascular: Negative for chest pain and palpitations. Gastrointestinal: Negative for abdominal distention, abdominal pain and vomiting. Endocrine: Negative for cold intolerance. Genitourinary: Negative for difficulty urinating. Musculoskeletal: Negative for arthralgias. Skin: Negative for pallor and rash. Allergic/Immunologic: Negative for immunocompromised state. Neurological: Negative for dizziness and syncope. Hematological: Negative for adenopathy. Psychiatric/Behavioral: Negative for agitation and hallucinations. All other systems reviewed and are negative. Except as noted above the remainder of the review of systems was reviewed and negative. PAST MEDICAL HISTORY     Past Medical History:   Diagnosis Date    Atrial fibrillation (HCC)     Chronic anemia     Chronic rhinitis     DR. NAPIER    Chronic sinusitis     Clostridium difficile colitis     Colon polyp 06/03/2015    DR Nayeli Claire    Diverticulosis of colon (without mention of hemorrhage) 06/03/2015    DR Nayeli Claire    Flash pulmonary edema (HCC)     high risk for this without tight blood pressure control    Lung disease     Lung mass     was suspicious for malignancy, but path negative.  Mitral regurgitation     MVP (mitral valve prolapse) 5/28/2014    Recurrent sinusitis     DR. NAPIER    Thrombocytosis [facility]     Thyroid nodule     Wegener's granulomatosis (granulomatosis with polyangiitis) ([facility]          SURGICALHISTORY       Past Surgical History:   Procedure Laterality Date    BRONCHOSCOPY      CATARACT REMOVAL WITH IMPLANT Left 03/18/16    [facility] Maryanne Shi MD    CATARACT REMOVAL WITH IMPLANT Right 04/22/16    [facility] Maryanne Shi MD    COLONOSCOPY  06/03/2015    DR Nayeli Claire - DIVERTICULOSIS, POLYPS    HYSTERECTOMY      PACEMAKER INSERTION  12/2019    [place] COLORECTAL SCRN; HI RISK IND N/A 8/6/2018    COLONOSCOPY performed by Tala Mcpherson MD at [address] [place] [address] Left 12/16/2016    [facility] PEDRO LUIS TELLO MD      THYROIDECTOMY, PARTIAL  11/18/14    PATRICIA CHACON MD    UPPER GASTROINTESTINAL ENDOSCOPY  7/13/15    w/bx  CURRENT MEDICATIONS       Previous Medications    ALENDRONATE (FOSAMAX) 70 MG TABLET    TAKE 1 TABLET BY MOUTH EVERY 7 DAYS    CETIRIZINE (ZYRTEC) 10 MG TABLET    Take 1 tablet by mouth daily    MAGNESIUM OXIDE (MAG-OX) 400 MG TABLET    Take 400 mg by mouth daily    METOPROLOL SUCCINATE (TOPROL XL) 100 MG EXTENDED RELEASE TABLET    TAKE ONE TABLET BY MOUTH DAILY    MIRTAZAPINE (REMERON) 15 MG TABLET    Take 1 tablet by mouth nightly    MULTIPLE VITAMIN (MULTI VITAMIN DAILY PO)    Take by mouth    PANTOPRAZOLE (PROTONIX) 40 MG TABLET    TAKE ONE TABLET BY MOUTH EVERY DAY       ALLERGIES     Azithromycin; Thiopental; and Asa [aspirin]    FAMILY HISTORY     History reviewed. No pertinent family history.        SOCIAL HISTORY       Social History     Socioeconomic History    Marital status:      Spouse name: None    Number of children: None    Years of education: None    Highest education level: None   Occupational History    None   Social Needs    Financial resource strain: None    Food insecurity     Worry: None     Inability: None    Transportation needs     Medical: None     Non-medical: None   Tobacco Use    Smoking status: Former Smoker     Packs/day: 0.25     Years: 8.00     Pack years: 2.00     Types: Cigarettes     Last attempt to quit: 1982     Years since quittin.3    Smokeless tobacco: Never Used    Tobacco comment: quit 34 years ago   Substance and Sexual Activity    Alcohol use: No     Alcohol/week: 0.0 standard drinks    Drug use: No    Sexual activity: Never   Lifestyle    Physical activity     Days per week: None     Minutes per session: None    Stress: None   Relationships    Social connections     Talks on phone: None     Gets together: None     Attends Anabaptism service: None     Active member of club or organization: None     Attends meetings of clubs or organizations: None     Relationship status: None    Intimate partner violence     Fear of current or ex partner: None     Emotionally abused: None     Physically abused: None     Forced sexual activity: None   Other Topics Concern    None   Social History Narrative    None       SCREENINGS      @FLOW([id number])@      PHYSICAL EXAM    (up to 7 for level 4, 8 or more for level 5)     ED Triage Vitals   BP Temp Temp src Pulse Resp SpO2 Height Weight   04/09/20 0436 -- -- 04/09/20 0443 04/09/20 0436 04/09/20 0436 04/09/20 0436 04/09/20 0436   (!) 109/97   95 28 100 % 5' (1.524 m) 100 lb (45.4 kg)       Physical Exam  Constitutional:       General: She is in acute distress. Appearance: She is ill-appearing. She is not diaphoretic. HENT:      Head: Normocephalic. Right Ear: Tympanic membrane normal.      Left Ear: Tympanic membrane normal.      Nose: Nose normal.      Mouth/Throat:      Mouth: Mucous membranes are moist.   Eyes:      Pupils: Pupils are equal, round, and reactive to light. Neck:      Musculoskeletal: Normal range of motion. Cardiovascular:      Rate and Rhythm: Normal rate. Pulmonary:      Effort: Respiratory distress present. Breath sounds: Rhonchi and rales present. Abdominal:      General: Abdomen is flat. There is no distension. Musculoskeletal: Normal range of motion. General: No swelling. Skin:     Capillary Refill: Capillary refill takes less than 2 seconds. Neurological:      Mental Status: She is alert. Psychiatric:         Mood and Affect: Mood normal.         DIAGNOSTIC RESULTS     EKG: All EKG's are interpreted by the Emergency Department Physician who either signs or Co-signsthis chart in the absence of a cardiologist.  EKG shows paced rhythm rate of 74. No acute ST or T wave changes. Normal axis. Abnormal EKG.     RADIOLOGY:   Non-plain filmimages such as CT, Ultrasound and MRI are read by the radiologist. Plain radiographic images are visualized and preliminarily interpreted by the emergency physician with the below findings:    Chest x-ray is consistent with congestive heart failure. Interpretation per the Radiologist below, if available at the time ofthis note:    XR CHEST PORTABLE    (Results Pending)         ED BEDSIDE ULTRASOUND:   Performed by ED Physician - none    LABS:  Labs Reviewed   COMPREHENSIVE METABOLIC PANEL - Abnormal; Notable for the following components:       Result Value    Glucose 134 (*)     BUN 63 (*)     Calcium 8.3 (*)     Total Protein 4.6 (*)     Alb 2.6 (*)      (*)      (*)     Globulin 2.0 (*)     All other components within normal limits   CBC WITH AUTO DIFFERENTIAL - Abnormal; Notable for the following components:    WBC 14.2 (*)     RBC 2.52 (*)     Hemoglobin 7.2 (*)     Hematocrit 22.4 (*)     MCHC 32.2 (*)     RDW 19.9 (*)     All other components within normal limits   TROPONIN - Abnormal; Notable for the following components:    Troponin 0.091 (*)     All other components within normal limits    Narrative:     CALL  [place]  [facility] tel. [phone],  Troponin results called to and read back by Juaquin Spaulding, 04/09/2020 06:25, by  [facility]   POCT ARTERIAL - Abnormal; Notable for the following components:    POC Potassium 3.4 (*)     POC Glucose 164 (*)     GFR Non-African American 55 (*)     pH, Arterial 7.223 (*)     pCO2, Arterial 65 (*)     pO2, Arterial 177 (*)     O2 Sat, Arterial 99 (*)     Lactate 3.52 (*)     POC Hematocrit 20 (*)     Hemoglobin 6.9 (*)     All other components within normal limits   CULTURE, BLOOD 1   CULTURE, BLOOD 2       All other labs were within normal range or not returned as of this dictation. EMERGENCY DEPARTMENT COURSE and DIFFERENTIAL DIAGNOSIS/MDM:   Vitals:    Vitals:    04/09/20 0600 04/09/20 0615 04/09/20 0630 04/09/20 0645   BP:  (!) 97/55 (!) 88/59    Pulse: 81 77 81 75   Resp: 18 18 14 19   SpO2: 99% 100% 100% 100%   Weight:       Height:            MDM I spoke to Dr. Kiana Cotton from infectious disease.   Given the patient's respiratory failure with secondary intubation requirement. He does not feel the patient should receive covid- 19 testing. She was tested 3 times at [facility] in the past month and tested negative [facility] in the past 24 hours and she was not [place] positive prior to leaving there. She had no fever at home and her brief time she has been at home. Patient admitted to the ICU for further management. Treated for congestive heart failure also. CRITICAL CARE TIME   Total Critical Care time was 35 minutes, excluding separately reportableprocedures. There was a high probability of clinicallysignificant/life threatening deterioration in the patient's condition which required my urgent intervention. CONSULTS:  None    PROCEDURES:  Unless otherwise noted below, none     Intubation  Date/Time: 4/9/2020 6:55 AM  Performed by: Aydee Rodgers MD  Authorized by: Aydee Rodgers MD     Consent:     Consent obtained:  Emergent situation and verbal    Consent given by:  Patient    Alternatives discussed:  No treatment  Pre-procedure details:     Patient status:  Unresponsive    Mallampati score:  III    Pretreatment medications:  Midazolam    Paralytics:  Succinylcholine  Procedure details:     Preoxygenation:  Nonrebreather mask    CPR in progress: no      Intubation method:  Oral    Oral intubation technique:  Video-assisted    Laryngoscope blade: Mac 4    Tube size (mm):  7.5    Tube type:  Cuffed    Number of attempts:  1    Ventilation between attempts: no      Cricoid pressure: yes      Tube visualized through cords: yes    Placement assessment:     ETT to lip:  22    Tube secured with:  ETT pan    Breath sounds:  Equal    Placement verification: chest rise, CXR verification and equal breath sounds      CXR findings:  ETT in proper place  Post-procedure details:     Patient tolerance of procedure: Tolerated well, no immediate complications        FINAL IMPRESSION      1.  Acute respiratory failure with hypoxia ([facility]    2. Acute systolic congestive heart failure ([facility]          DISPOSITION/PLAN   DISPOSITION Decision To Admit 04/09/2020 06:56:38 AM      PATIENT REFERRED TO:  No follow-up provider specified.     DISCHARGE MEDICATIONS:  New Prescriptions    No medications on file          (Please note that portions of this note were completed with a voice recognition program.  Efforts were made to edit the dictations but occasionally words are mis-transcribed.)    Megan Hopson MD (electronically signed)  Attending Emergency Physician          Megan Hopson MD  04/09/20 1313

## 2020-04-09 NOTE — CONSULTS
Infectious Disease     Patient Name: Freddie Elizondo  Date: 2020  YOB: 1950  Medical Record Number: [id number]        Wegener's granulomatosis      History of Present Illness:    Atrial fibrillation chronic rhinitis chronic sinusitis C. difficile colitis diverticulosis  Diagnosed with Wegener's granulomatosis  CT scan performed on 2020 showed increasing size and number of cavitary lung lesions along with a history of chronic sinusitis  Patient has been at the [facility] discharged on 2024 the diagnosis of Wegener's granulomatosis has been made she returned to the emergency room because of increasing shortness of breath  Required intubation mechanical ventilation  At the [facility] she was tested for coronavirus on 3 2020 and 2020 both times being negative  Found to be ANCA positive  QuantiFERON gold was negative fungal serologies negative    Being actively treated with rituximab and steroids    While the hospital patient was being treated with cefepime and vancomycin for possible pneumonia        White blood cell count elevated lymphocyte count normal patient anemic elevated troponin  Significantly elevated AST ALT    Chest x-ray showing reticular nodular infiltrates throughout bilateral lower lobes small effusions right side greater than left appears consistent with CT scan from 2020    Repeat CT scan shows large bilateral pleural effusion multiple cavitary nodule      Review of Systems   Unable to perform ROS: Intubated       Review of Systems: All 14 review of systems negative other than as stated above    Social History     Tobacco Use    Smoking status: Former Smoker     Packs/day: 0.25     Years: 8.00     Pack years: 2.00     Types: Cigarettes     Last attempt to quit: 1982     Years since quittin.3    Smokeless tobacco: Never Used    Tobacco comment: quit 34 years ago   Substance Use Topics    Alcohol use: No     Alcohol/week: 0.0 standard  MVP (mitral valve prolapse)    Osteoporosis    Lumbosacral radiculopathy at L5    Low back pain    Chronic obstructive pulmonary disease (HCC)    Supraventricular tachycardia (HCC)    Diverticulosis of large intestine without diverticulitis    Cavitating mass of lower lobe of lung    Chronic anemia    Recurrent sinusitis    Chronic rhinitis    Thrombocytosis (HCC)    Chronic dacryocystitis    Benign neoplasm of sigmoid colon    Chronic renal failure, stage 3 (moderate) (HCC)    URI with cough and congestion    Diverticulitis of colon    COPD exacerbation (HCC)    Sepsis (HCC)    Atrial fibrillation (HCC)    Multiple lung nodules on CT, with cavity.  r/o wagners disease    Wegener's granulomatosis (granulomatosis with polyangiitis) (HCC)    Clostridium difficile colitis    Flash pulmonary edema ([address]    Mitral regurgitation    Acute respiratory failure with hypoxia (HCC)         PLAN:    Patient with chronic lung disease from Wegener's granulomatosis  With superimposed CHF    Findings consistent with this not consistent with coronavirus infection especially given 2- tests while patient was hospitalized at the [facility] over the past 3 weeks  Okay to remove from isolation

## 2020-04-09 NOTE — PROGRESS NOTES
oral intake  · Etiology: related to Impaired respiratory function-inability to consume food     Signs and symptoms:  as evidenced by NPO status due to medical condition    Objective Information:  · Nutrition-Focused Physical Findings: OG in place, new admit, no I/O avaialble, reveived fluid boluses in ED, Meds include : lasix, steroids, propofol @ 8.2 ml/hr, Labs include : elevated WBC's, LFTS BUN = 63, Glus = 134, Hx : COPD, flash pulmonary edema, 'Wegners granulomatosis', CKDIII  · Wound Type: (flowsheet not yet completed)  · Current Nutrition Therapies:  · Oral Diet Orders: NPO(4/9)   · Tube Feeding (TF) Orders:   · Feeding Route: Orogastric  · Formula: Standard w/Fiber(Jevity 1.2)  · Rate (ml/hr):20 ml/hr x 24 hrs    · Volume (ml/day): 480 ml  · Duration: Continuous  · Water Flushes: 100 ml, 4 x daily ( 400 ml)  · Current TF & Flush Orders Provides: 576 kcals, (+ propofol kcals), 27 g protein, ~790 ml free water  · Goal TF & Flush Orders Provides: 576 kcals, (+ propofol kcals), 27 g protein, ~790 ml free water  · Additional Calories: propofol ~ 215 kcals, trophic TF initiated today, total delivers < 75% estimated energy, < 40% estimated protein needs  · Anthropometric Measures:  · Ht: 5' (152.4 cm)   · Current Body Wt:  n/a  · Admission Body Wt: 100 lb (45.4 kg)(stated)  · Usual Body Wt: 113 lb (51.3 kg)((8/19), 106# ( 11/19), 90# ( 3/30))  · Weight Change: UTD- Cardiology reports hx of 30# weight loss in notes   · Ideal Body Wt: 100 lb (45.4 kg), % Ideal Body UTD  · BMI Classification: BMI 18.5 - 24.9 Normal Weight(est)    Nutrition Interventions:   Continue current Tube Feeding(Continue with Standard with Fiber TF (Jevity 1.2) @ trophic rate of 20 ml/hr with 100 ml water flush every 6 hrs.  Monitor tolerance to EN, changed in propofol and adjust EN accordingly)  Continued Inpatient Monitoring, Education Not Indicated    Nutrition Evaluation:   · Evaluation: Goals set   · Goals: initation and tolerance of EN

## 2020-04-09 NOTE — CONSULTS
Pulmonary and Critical Care Medicine  Consult Note  Encounter Date: 2020 12:35 PM    Ms. Winston Moore is a 69 y.o. female  : 1950  Requesting Provider: Sheila Fuentes MD    Reason for request: Acute hypoxic respiratory failure            HISTORY OF PRESENT ILLNESS:    Patient is 69 y.o. presents with acute hypoxic respiratory failure, she has history of granulomatosis polyangiitis she usually follows up at [facility] currently on rituximab and tapered dose prednisone, she was discharged from [facility] (formerly [facility]) yesterday after 25 days of hospitalization was admitted for acute kidney injury and hyperkalemia along with weight loss, she was ANCA positive diagnosed on kidney biopsy. She had C. difficile colitis, she was tested for SARS-COV 2 multiple times and came negative,  Currently patient intubated and sedated, she is unable to provide history, urine output 350 cc, bowels moving, no fever, she open her eyes to verbal stimuli. Past Medical History:        Diagnosis Date    Atrial fibrillation (HCC)     Chronic anemia     Chronic rhinitis     DR. NAPIER    Chronic sinusitis     Clostridium difficile colitis     Colon polyp 2015    DR Nii Reddy    Diverticulosis of colon (without mention of hemorrhage) 2015    DR Nii Reddy    Flash pulmonary edema (HCC)     high risk for this without tight blood pressure control    Lung disease     Lung mass     was suspicious for malignancy, but path negative.  Mitral regurgitation     MVP (mitral valve prolapse) 2014    Recurrent sinusitis     DR. NAPIER    Thrombocytosis ([facility] [address]     Thyroid nodule     Wegener's granulomatosis (granulomatosis with polyangiitis) [facility])        Past Surgical History:        Procedure Laterality Date    BRONCHOSCOPY      CATARACT REMOVAL WITH IMPLANT Left 16    [facility]    CATARACT REMOVAL WITH IMPLANT Right 16    [facility] Ginger Rowe MD    COLONOSCOPY  2015    DR Nii Reddy - DIVERTICULOSIS, POLYPS    HYSTERECTOMY      PACEMAKER INSERTION  12/2019    HI COLORECTAL SCRN; HI RISK IND N/A 8/6/2018    COLONOSCOPY performed by Mayank Denney MD at [address] [place] [address] Ave Left 12/16/2016    Maddi Chery MD      THYROIDECTOMY, PARTIAL  11/18/14    PATRICIA CHACON MD    UPPER GASTROINTESTINAL ENDOSCOPY  7/13/15    w/bx        Social History:     reports that she quit smoking about 37 years ago. Her smoking use included cigarettes. She has a 2.00 pack-year smoking history. She has never used smokeless tobacco. She reports that she does not drink alcohol or use drugs. Family History:   History reviewed. No pertinent family history. Allergies:  Azithromycin; Thiopental; and Asa [aspirin]        MEDICATIONS during current hospitalization:    Continuous Infusions:   propofol 30 mcg/kg/min (04/09/20 1052)       Scheduled Meds:   furosemide        sodium chloride flush  10 mL Intravenous 2 times per day    famotidine (PEPCID) injection  20 mg Intravenous BID    enoxaparin  40 mg Subcutaneous Daily    furosemide  20 mg Intravenous Daily    metoprolol  5 mg Intravenous Q2H    nitroglycerin  1 inch Topical 4 times per day       PRN Meds:sodium chloride flush, acetaminophen **OR** acetaminophen, polyethylene glycol, promethazine **OR** ondansetron        REVIEW OF SYSTEMS:  ROS: 10 organs review of system is done including general, psychological, ENT, hematological, endocrine, respiratory, cardiovascular, gastrointestinal, musculoskeletal, neurological,  allergy and Immunology is done and is otherwise negative. PHYSICAL EXAM:    Vitals:  BP (!) 141/98   Pulse 70   Resp 16   Ht 5' (1.524 m)   Wt 100 lb (45.4 kg)   LMP  (LMP Unknown)   SpO2 100%   BMI 19.53 kg/m²     General: On vent, open her eyes, does not follow command she is on propofol drip. Head: normocephalic, atraumatic  Eyes:No gross abnormalities.   ENT:  MMM no lesions  Neck:  supple and no masses  Chest : Bilateral rales, no wheezing, nontender, tympanic  Heart[de-identified] Heart sounds are normal.  Regular rate and rhythm without murmur, gallop or rub. ABD:  symmetric, soft, non-tender  Musculoskeletal : no cyanosis, no clubbing and no edema  Neuro: Sedated  Skin: No rashes or nodules noted.   Lymph node:  no cervical nodes  Urology: Yes Richard   Psychiatric: appropriate        Data Review  Recent Labs     04/09/20  0445 04/09/20  0543 04/09/20  1051   WBC 14.2*  --   --    HGB 7.2* 6.9* 8.4*   HCT 22.4*  --   --      --   --       Recent Labs     04/09/20 0445 04/09/20  0543 04/09/20  1051     --   --    K 3.9  --   --      --   --    CO2 23  --   --    BUN 63*  --   --    CREATININE 0.83 1.0 1.0   GLUCOSE 134*  --   --        MV Settings:     Vent Mode: AC/VC  Vt Ordered: (S) 320 mL  Rate Set: (S) 26 bmp  PEEP/CPAP: 5  Peak Inspiratory Pressure: 28 cmH2O  Mean Airway Pressure: 11 cmH20  I:E Ratio: 1:2    ABGs:   Recent Labs     04/09/20  0543 04/09/20  1051   PHART 7.223* 7.464*   CXC1NRV 65* 40   PO2ART 177* 126*   XWC4YLU 26.6 28.9   BEART -1 5*   E7SUYDEZ 99* 99*   JVG8SAS 29 30*     O2 Device: Ventilator  Lab Results   Component Value Date    LACTA 1.7 11/14/2019    LACTA 1.5 10/25/2019       Radiology  I personally reviewed imaging studies and right-sided effusion multiple nodules with hazy groundglass        Assessment, plan:   Patient is at risk due to    · Acute hypoxic and hypercapnic respiratory failure, could be due to flash pulmonary edema versus underlying GPA  · Elevated troponin likely demand ischemia  · COVID-19 is unlikely based on presentation and prior testing  ·  A. fib currently rate controlled        Thank you for consultation  · Vent support lung protective strategy  · head of the bed 30°  · Daily sedation holidays and breathing trials  · Sedation with combination propofol  target R ASS of 0 to -1  · We will hold sedation and attempt breathing trial today  · Watch for

## 2020-04-09 NOTE — CONSULTS
hypertension, conduction disease, and atrial fibrillation with AV node ablation and pacemaker implantation December. Recently with progressive lung disease found to have Wegener's granulomatosis polyangiitis. Last discharged from [facility] clinic yesterday after 25-day hospitalization. Please see note below. She was found to have progressive renal failure biopsy was consistent with Wegener's. She had cavitary and solid lung masses also consistent with ANCA positive granulomatous polyangiitis. She apparently had 2 episodes of acute pulmonary edema at the [facility] clinic though troponins remained negative. She awoke this morning with acute shortness of breath presented emergency department in respiratory failure with pulse ox 70% and required intubation and ventilatory support. Importantly she has had 3 negative coronavirus tests at [facility]. Review of Systems:   Unobtainable,on vent support. [facility] admit March 13th through April 8. Presented with acute kidney injury and hyperkalemia. Also 30 pound weight loss chronic cough. She did  sepsis found to have fusiform bacteremia. 2 episodes of flash pulmonary edema felt related to her mitral regurgitation and hypertension. Also found to have ANCA positive granulomatosis with polyangiitis treated with methylprednisolone and rituximab. 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 Chest lesions appeared to decrease with therapy. C. Diff colitis felt resolved  COVID r/o multiple occasions  Troponins neg on several occasions, most recent 4/5/2020    CARDIAC HISTORY:  Hypertension  Persistent atrial fibrillation, new onset  2019. Rapid rates. Antiarrhythmics not effective. 12/17/19 AV node ablation. Med[employer]onic Sophie XT  MRI pacemaker implantation  Hx PSVT  No known CAD with non diagnostic treadmill stress JUN 2019, remote neg perfusion study  Hyperlipidemia  3/30/3020 Pacer check at [facility]: Normal function.  3% of time with atrial fib, occasionally to 7 hours. No VT.  3/20/2020 Echo LVEF 71%. LVH. Normal RV 2-3+ MR. RVSP 30 mm Hg. 4/5/2020 Troponin negative [facility]. Key Med Dx  2/2020 New diagnosis of granulomatous polyangitis, ANCA positive  2/28/20 CHEST CT [facility]: Mulitple solid cavitary masses of increasing size. Coronary calcium seen  Severe microcytic anemia with thrombocytopenia. Anticoagulation not utilized  Severe COPD with recurrent resp failure/admits  Hyperthyroidism  ASA intolerance  CKD III    Past Medical History:   Diagnosis Date    Atrial fibrillation (HCC)     Chronic anemia     Chronic rhinitis     DR. NAPIER    Chronic sinusitis     Clostridium difficile colitis     Colon polyp 06/03/2015    DR Kristin Dean    Diverticulosis of colon (without mention of hemorrhage) 06/03/2015    DR Kristin Dean    Flash pulmonary edema (HCC)     high risk for this without tight blood pressure control    Lung disease     Lung mass     was suspicious for malignancy, but path negative.  Mitral regurgitation     MVP (mitral valve prolapse) 5/28/2014    Recurrent sinusitis     DR. NAPIER    Thrombocytosis ([address]     Thyroid nodule     Wegener's granulomatosis (granulomatosis with polyangiitis) [facility])      Past Surgical History:   Procedure Laterality Date    BRONCHOSCOPY      CATARACT REMOVAL WITH IMPLANT Left 03/18/16    [facility] Nica Starks MD    CATARACT REMOVAL WITH IMPLANT Right 04/22/16    [facility] Nica Starks MD    COLONOSCOPY  06/03/2015    DR Kristin Dean - DIVERTICULOSIS, POLYPS    HYSTERECTOMY      PACEMAKER INSERTION  12/2019    HI COLORECTAL SCRN; HI RISK IND N/A 8/6/2018    COLONOSCOPY performed by Soto Johnson MD at [address] [place] [address] Left 12/16/2016    [facility] PEDRO LUIS TELLO MD      THYROIDECTOMY, PARTIAL  11/18/14    PATRICIA CHACON MD    UPPER GASTROINTESTINAL ENDOSCOPY  7/13/15    w/bx      History reviewed. No pertinent family history.   Social History     Socioeconomic History    Marital status: 400 MG tablet Take 400 mg by mouth daily      cetirizine (ZYRTEC) 10 MG tablet Take 1 tablet by mouth daily 90 tablet 1    metoprolol succinate (TOPROL XL) 100 MG extended release tablet TAKE ONE TABLET BY MOUTH DAILY  3    Multiple Vitamin (MULTI VITAMIN DAILY PO) Take by mouth               Objective:   Vitals:    04/09/20 0704 04/09/20 0705 04/09/20 0744 04/09/20 0832   BP:  124/77 (!) 141/98    Pulse: 74 71 70    Resp: 18 16 16    SpO2:   100% 100%   Weight:       Height:          Wt Readings from Last 3 Encounters:   04/09/20 100 lb (45.4 kg)   03/13/20 95 lb (43.1 kg)   03/04/20 90 lb (40.8 kg)       TELEMETRY: paced                            Rhythm Strip: AV paced, continuous. No SVT/VT    Physical Exam:  General Appearance: on vent. alert and oriented to person, cannot fully assess as on vent. Seems comfortable, not diaphoretic. Cardiovascular: normal rate, regular rhythm, normal S1 and S2, no murmurs, Distant heart sounds rubs, clicks, or gallops,  no JVD  Pulmonary/Chest: diminished breath sounds. Diffuse crackles. Abdomen: soft, non-tender, non-distended, normal bowel sounds, no masses no hepatomegaly  Extremities: no cyanosis, clubbing or edema  Skin: warm and dry, no rash or erythema  Eyes: EOMI  Neck: supple and non-tender without mass, no thyromegaly   Neurological: alert, , no focal findings or movement disorder noted  VASC: pulses palpable    Allergies:   Allergies   Allergen Reactions    Azithromycin     Thiopental Other (See Comments)    Asa [Aspirin] Palpitations        Medications:    furosemide        propofol        sodium chloride flush  10 mL Intravenous 2 times per day    famotidine (PEPCID) injection  20 mg Intravenous BID    enoxaparin  40 mg Subcutaneous Daily    furosemide  20 mg Intravenous Daily      sodium chloride       sodium chloride flush, 10 mL, PRN  acetaminophen, 650 mg, Q6H PRN    Or  acetaminophen, 650 mg, Q6H PRN  polyethylene glycol, 17 g, Daily PRN  promethazine, 12.5 mg, Q6H PRN    Or  ondansetron, 4 mg, Q6H PRN        Diagnostics:  EKG: Av paced on monitor    Echo:  pending    CXR:             Portable:   Results for orders placed during the hospital encounter of 20   XR CHEST PORTABLE    Narrative Patient MRN: [id number]    : 1950    Age:  69 years    Gender: Female    Order Date: 2020 4:45 AM.     Exam: XR CHEST PORTABLE    Number of Views: 1     Indication:  Shortness of breath status post intubation    Comparison: 2019    Findings: Stable, prominent cardiomediastinal silhouette with an endotracheal tube with its distal tip approximately 3 cm superior to the diaz. Vascular calcifications thoracic aorta. No pneumothorax. Underlying abnormal reticulonodular lung   interstitial pattern with superimposed airspace disease bilateral mid lungs and bilateral lower lobes with bilateral pleural effusions. No pneumothorax. Impression Impression:    1. Abnormal underlying reticulonodular interstitial pattern is nonspecific and could reflect typical versus atypical infection, edema or other etiology. 2. Superimposed airspace disease bilateral mid lungs and bilateral lower lobes concerning for superimposed bilateral multifocal infiltrate/pneumonia, of indeterminate etiology and origin. 3. Small bilateral pleural effusions. My review: not classic chf, but cannot exclude    CT chest:  Full report pending.       Lab Data:    Cardiac Enzymes:  Recent Labs     20   TROPONINI 0.091*     ProBNP:   Lab Results   Component Value Date    PROBNP 19,915 2020       CBC:   Recent Labs     205 20  0543   WBC 14.2*  --    RBC 2.52*  --    HGB 7.2* 6.9*   HCT 22.4*  --      --        CMP:    Recent Labs     205 20  0543     --    K 3.9  --      --    CO2 23  --    BUN 63*  --    CREATININE 0.83 1.0   GFRAA >60.0 >60   LABGLOM >60.0 55*   GLUCOSE 134*  --    CALCIUM 8.3*  --

## 2020-04-10 NOTE — PROGRESS NOTES
tolerating full liquids. Pt stated she had lost wt ~ 2 mos ago (reported 88 lb), has been trying to regain wt., was taking Ensure between meals pta. · Wound Type: None  · Current Nutrition Therapies:  · Oral Diet Orders: Full Liquid   · Oral Diet intake: %  · Oral Nutrition Supplement (ONS) Orders: None  · Anthropometric Measures:  · Ht: 5' (152.4 cm)   · Current Body Wt: 102 lb (46.3 kg)(actual;bedscale)  · Admission Body Wt: 100 lb (45.4 kg)(stated)  · Usual Body Wt: 113 lb (51.3 kg)((8/19), 106# ( 11/19), 90# ( 3/30))  · % Weight Change:  ,  UTD- Cardiology reports hx of 30# weight loss in notes  · Ideal Body Wt: 100 lb (45.4 kg), % Ideal Body UTD  · BMI Classification: BMI 18.5 - 24.9 Normal Weight(est)    Nutrition Interventions:   Modify current diet, Start ONS(Advance to General diet as tolerated.  Start high calorie ONS BID )  Continued Inpatient Monitoring, Education Not Indicated    Nutrition Evaluation:   · Evaluation: Goals set(New goal with extubation)   · Goals: pt to tolerate progression of po diet with intake > 75% of meals and supplements, stable weight wt ~ 100 lb    · Monitoring: Nutrition Progression, Meal Intake, Supplement Intake, Diet Tolerance, Weight, Pertinent Labs      Electronically signed by Olivia Lomeli RD, LD on 4/10/20 at 11:24 AM EDT

## 2020-04-10 NOTE — PROGRESS NOTES
6. 5   HGB 7.2*   < > 8.0* 6.6*   HCT 22.4*  --   --  20.6*   MCV 89.0  --   --  88.3     --   --  250    < > = values in this interval not displayed. Recent Labs     04/09/20  0445  04/09/20  1716 04/10/20  0544     --   --  149*   K 3.9  --   --  4.4     --   --  106   CO2 23  --   --  27   BUN 63*  --   --  63*   CREATININE 0.83   < > 1.0 0.91*   GLUCOSE 134*  --   --  119*   CALCIUM 8.3*  --   --  8.3*   PROT 4.6*  --   --   --    LABALBU 2.6*  --   --   --    BILITOT 0.4  --   --   --    ALKPHOS 68  --   --   --    *  --   --   --    *  --   --   --    LABGLOM >60.0   < > 55* >60.0   GFRAA >60.0   < > >60 >60.0   GLOB 2.0*  --   --   --     < > = values in this interval not displayed.        MV Settings:     Vent Mode: CPAP  Vt Ordered: 320 mL  Rate Set: 26 bmp  FiO2 : 35 %  PEEP/CPAP: 6  Pressure Support: 5 cmH20  Peak Inspiratory Pressure: 10 cmH2O  Mean Airway Pressure: 14 cmH20  I:E Ratio: 1:2    Recent Labs     04/09/20 1716   PHART 7.465*   HEM5PNC 43   PO2ART 112*   ADX5BQD 31.3*   BEART 8*   X3UDFTVN 99*       O2 Device: Nasal cannula  O2 Flow Rate (L/min): 3 L/min    Diet NPO Time Specified     MEDICATIONS during current hospitalization:    Continuous Infusions:   propofol Stopped (04/09/20 1300)       Scheduled Meds:   sodium chloride  20 mL Intravenous Once    sodium chloride flush  10 mL Intravenous 2 times per day    famotidine (PEPCID) injection  20 mg Intravenous BID    enoxaparin  40 mg Subcutaneous Daily    furosemide  20 mg Intravenous Daily    metoprolol  5 mg Intravenous Q2H    nitroglycerin  1 inch Topical 4 times per day    methylPREDNISolone  40 mg Intravenous Q6H       PRN Meds:sodium chloride flush, acetaminophen **OR** acetaminophen, polyethylene glycol, promethazine **OR** ondansetron    Radiology  Cta Chest W Wo Contrast    Result Date: 4/9/2020  EXAMINATION: CTA CHEST W WO CONTRAST  DATE AND TIME:4/9/2020 12:45 PM CLINICAL HISTORY: Acute

## 2020-04-10 NOTE — PROGRESS NOTES
(46.3 kg)   03/13/20 95 lb (43.1 kg)   03/04/20 90 lb (40.8 kg)       General:  Awake, alert, oriented X 3. No apparent distress. Heart:  Regular rate Regular rhythm, Grade 2/6 systolic murmur of Mitral regurgitation. Lungs: CTA bilaterally, bilat symmetrical expansion, no wheeze, rales, or rhonchi. Abdomen: Bowel sounds present, soft, nontender,  no peritoneal signs  Extremities:  No  edema Distal   Skin: Warm and dry  Mood and affect: Appropriate for the circumstance. Telemetry:      atrial fibrillation,ventricular paced         LABS:   CBC:   Recent Labs     04/09/20  0445  04/09/20  1051 04/09/20  1716 04/10/20  0544   WBC 14.2*  --   --   --  6.5   HGB 7.2*   < > 8.4* 8.0* 6.6*     --   --   --  250    < > = values in this interval not displayed. BMP:    Recent Labs     04/09/20  0445  04/09/20  1051 04/09/20  1716 04/10/20  0544     --   --   --  149*   K 3.9  --   --   --  4.4     --   --   --  106   CO2 23  --   --   --  27   BUN 63*  --   --   --  63*   CREATININE 0.83   < > 1.0 1.0 0.91*   GLUCOSE 134*  --   --   --  119*    < > = values in this interval not displayed. Troponin: No results for input(s): TROPONINT in the last 72 hours. BNP:   Recent Labs     04/09/20 0445   PROBNP 19,915      INR: No results for input(s): INR in the last 72 hours. Mg: No results for input(s): MG in the last 72 hours. Cardiac Testing:     Summary   Left ventricular size is normal .   Abnormal diastolic dysfunction by tissue doppler index. Left ventricular ejection fraction is visually estimated at 50%. Pacing causes incongruous wall motion   Moderately severe (2-3+) mitral regurgitation is present, probably closer   to 3+. There is mild to moderate ( 1-2 +) tricuspid regurgitation with estimated   RVSP of 36 mm Hg. Moderately dilated left atrium. Moderately increased right ventricle wall thickness.    Pacemaker artifact noted in the right atrium and right

## 2020-04-10 NOTE — DISCHARGE INSTR - COC
{CHP DME MII}  Feeding  {[facility] DME HJRQ:527441698}  Med Admin  {[facility] DME OXPW:779914011}  Med Delivery   { PATRICIA MED Delivery:484130512}    Wound Care Documentation and Therapy:        Elimination:  Continence:   · Bowel: {YES / RD:09439}  · Bladder: {YES / EO:43500}  Urinary Catheter: {Urinary Catheter:619282642}   Colostomy/Ileostomy/Ileal Conduit: {YES / Y}       Date of Last BM: ***    Intake/Output Summary (Last 24 hours) at 4/10/2020 1514  Last data filed at 4/10/2020 1144  Gross per 24 hour   Intake 922 ml   Output --   Net 922 ml     I/O last 3 completed shifts: In: 80 [P.O.:600;  I.V.:22; Blood:300]  Out: -     Safety Concerns:     [employer] Safety Concerns:738691808}    Impairments/Disabilities:      [employer] Impairments/Disabilities:235424988}    Nutrition Therapy:  Current Nutrition Therapy:   [employer] Diet List:377731069}    Routes of Feeding: {[facility] DME Other Feedings:238772225}  Liquids: {Slp liquid thickness:54830}  Daily Fluid Restriction: {P DME Yes amt example:134067764}  Last Modified Barium Swallow with Video (Video Swallowing Test): {Done Not Done CVMD:236676583}    Treatments at the Time of Hospital Discharge:   Respiratory Treatments: ***  Oxygen Therapy:  {Therapy; copd oxygen:58742}  Ventilator:    { CC Vent WNTQ:315641051}    Rehab Therapies: {THERAPEUTIC INTERVENTION:3136240427}  Weight Bearing Status/Restrictions: [employer] Weight Bearin}  Other Medical Equipment (for information only, NOT a DME order):  {EQUIPMENT:343346352}  Other Treatments: ***    Patient's personal belongings (please select all that are sent with patient):  {[facility] DME Belongings:471912997}    RN SIGNATURE:  {Esignature:653375407}    CASE MANAGEMENT/SOCIAL WORK SECTION    Inpatient Status Date: ***    Readmission Risk Assessment Score:  Readmission Risk              Risk of Unplanned Readmission:        36           Discharging to Facility/ Agency   · Name:

## 2020-04-10 NOTE — PROGRESS NOTES
pericardial effusion. The esophagus is nonspecific with some  gas and fluid density seen in the upper esophagus which could be  related to reflux or dysmotility. There is a small sliding-type hernia. There are old left rib fractures. There are no suspicious osseous  lesions. IMPRESSION NEW BILATERAL LOWER LOBE SUBPLEURAL LUNG OPACITIES WITH CT  APPEARANCE OF MULTIFOCAL ATELECTASIS^ NEOPLASTIC LESIONS ARE CONSIDERED  LESS LIKELY. FOLLOWUP CT SCAN MAY BE OBTAINED TO ENSURE CLEARING. TRACE  RIGHT PLEURAL EFFUSION. TRACE PERICARDIAL EFFUSION. ABNORMAL FINDING OF  FLUID DENSITY IN THE UPPER ESOPHAGUS--CONSIDER DYSMOTILITY OR REFLUX. UNDERLYING ESOPHAGEAL LESION IS NOT EXCLUDED. Jasmine Cruz M.D. Released By- Jonelle Bateman M.D. Released Date Time- 07/22/15 0858   This document has been electronically signed. ------------------------------------------------------------------------------        WITHOUT CONTRAST:   Results for orders placed during the hospital encounter of 02/28/20   CT CHEST WO CONTRAST    Narrative CT CHEST WITHOUT CONTRAST    HISTORY: Shortness of breath and weight loss. Follow-up lung masses. COMPARISON: 12/5/2019, 7/17/2015, 2/25/2015, 10/24/2014, and 3/26/2009 CT chest exams    TECHNIQUE: Thin section axial spiral images are obtained from the lung apices through the diaphragm without contrast. Coronal and sagittal reconstructions are performed. Images are reviewed in soft tissue and pulmonary parenchymal windows. All CT scans   at this facility use dose modulation, iterative reconstruction, and/or weight based dosing when appropriate to reduce radiation dose to as low as reasonably achievable. FINDINGS:    There are multiple bilateral mid to predominately lower lung field mildly ill-defined solid and cavitary masses which have increased in number since 12/5/2019.  Most of the lesions have increased in size with some of the reticulonodular interstitial pattern is nonspecific and could reflect typical versus atypical infection, edema or other etiology. 2. Superimposed airspace disease bilateral mid lungs and bilateral lower lobes concerning for superimposed bilateral multifocal infiltrate/pneumonia, of indeterminate etiology and origin. 3. Small bilateral pleural effusions. Echo No results found for this or any previous visit. Assessment/Plan:    Active Hospital Problems    Diagnosis Date Noted    Acute respiratory failure with hypoxia ([facility] [address] [J96.01] 04/09/2020     1 - Acute hypoxic and hypercapnic respiratory failure     4/9 - intubated in ER for severe hypoxia, will d/w Intensivist regarding CT chest and possible extubation if remains improved. COVID unlikely, patient was tested twice, most recently 4/2/20.     4/10 - extubated overnight, improved respiratory status    2 - Acute pulmonary edema, suspected related to Wegener's granulomatosis, possible myocarditis, possible CHF exac     4/9 - echo, iv lasix, cont supportive respiratory care, LFTs elevated concerning for right sided heart failure. Trop elevation may be due to this as well, will cycle, cardiology on consultation. Beta blocker.       4/10 - echo essentially unchanged with MVR 3+, cont diuresis, appreciate cardiac recs    3 - Wegener's Granulomatosis                4/9 - Recent diagnosis, on steroid daily prior to arrival.  On rituximab as well. Will cont steroid at high dose, may eventually need transfer to higher level of care if needing more aggressive treatment.       4 - Persistent Afib     5 - Anemia, chronic +/- acute                4/9 - initial hemoglobin was 6.9 however improved without transfusion. Monitor. No active bleed noted.   Likely due to immunosuppression.     4/10 - no s/s active bleeding, likely 2/2 immunosuppression and resp failure, tx 1 unit for hgb 6.6    6 - DVT proph    Electronically signed by Aj Valdez MD on

## 2020-04-10 NOTE — FLOWSHEET NOTE
Handoff report from [address] Gerri Siegel RN Pt passed swallow eval. Pt is hungry advanced diet to full liquid. Report called to Antwan Duffy. Pt left [address].  Electronically signed by Laurie Meza RN on 4/9/2020 at 11:01 PM

## 2020-04-11 NOTE — PROGRESS NOTES
 Lung disease     Lung mass     was suspicious for malignancy, but path negative.  Mitral regurgitation     MVP (mitral valve prolapse) 5/28/2014    Recurrent sinusitis     DR. NAPIER    Thrombocytosis ([address]     Thyroid nodule     Wegener's granulomatosis (granulomatosis with polyangiitis) [facility])      Past Surgical History:   Procedure Laterality Date    BRONCHOSCOPY      CATARACT REMOVAL WITH IMPLANT Left 03/18/16    [facility] Jah Mckinnon MD    CATARACT REMOVAL WITH IMPLANT Right 04/22/16    [facility] Jah Mckinnon MD    COLONOSCOPY  06/03/2015    DR Grover Russell - DIVERTICULOSIS, POLYPS    HYSTERECTOMY      PACEMAKER INSERTION  12/2019    CA COLORECTAL SCRN; HI RISK IND N/A 8/6/2018    COLONOSCOPY performed by Twylla Bloch, MD at [address] [place] [address] Left 12/16/2016    Veronica Luo MD      THYROIDECTOMY, PARTIAL  11/18/14    PATRICIA CHACON MD    UPPER GASTROINTESTINAL ENDOSCOPY  7/13/15    w/bx         Restrictions        Safety Devices: Safety Devices  Safety Devices in place: Yes  Type of devices:  All fall risk precautions in place    Subjective  Pre Treatment Pain Screening  Pain at present: 0  Scale Used: Numeric Score  Intervention List: Patient able to continue with treatment, Patient declined any intervention    Pain Reassessment:   Pain Assessment  Patient Currently in Pain: No  Pain Assessment: 0-10  Pain Level: 0       Prior Level of Function:  Social/Functional History  Lives With: Spouse  Type of Home: House  Home Layout: One level  Home Access: Stairs to enter with rails  Entrance Stairs - Number of Steps: 3  Entrance Stairs - Rails: Right  Bathroom Shower/Tub: Tub/Shower unit  Bathroom Toilet: Standard  Bathroom Equipment: Shower chair, Hand-held shower  Home Equipment: Rolling walker, Cane  Receives Help From: Family  ADL Assistance: Independent  Homemaking Assistance: Independent  Homemaking Responsibilities: Yes  Ambulation Assistance: Independent  Transfer Assistance: Independent  Active : No  Patient's  Info: family  Occupation: Retired  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[address],[address], laundry, deli  Leisure & Hobbies: reading, go to [employer]    OBJECTIVE:     Orientation Status:  Orientation  Overall Orientation Status: Within Functional Limits    Observation:  Observation/Palpation  Posture: Good  Observation: Pt up in bed, pleasant and cooperative    Cognition Status:  Cognition  Overall Cognitive Status: Exceptions  Arousal/Alertness: Appropriate responses to stimuli  Following Commands: Follows one step commands with repetition(d/t [place])  Attention Span: Appears intact  Memory: Appears intact  Safety Judgement: Decreased awareness of need for assistance, Decreased awareness of need for safety  Problem Solving: Assistance required to generate solutions, Assistance required to correct errors made, Assistance required to identify errors made, Assistance required to implement solutions  Insights: Fully aware of deficits  Initiation: Requires cues for some  Sequencing: Does not require cues  Cognition Comment: Comp 5 Express 6 Social 7 Prob 4 Mem 6    Perception Status:  Perception  Overall Perceptual Status: WFL    Sensation Status:  Sensation  Overall Sensation Status: WFL    Vision and Hearing Status:  Vision  Vision: Impaired  Vision Exceptions: Wears glasses for reading  Hearing  Hearing: Exceptions to [facility]  Hearing Exceptions: Hard of hearing/hearing concerns     ROM:   LUE AROM (degrees)  LUE AROM : WFL  Left Hand AROM (degrees)  Left Hand AROM: WFL  RUE AROM (degrees)  RUE AROM : WFL  Right Hand AROM (degrees)  Right Hand AROM: WFL    Strength:  LUE Strength  Gross LUE Strength: Exceptions to [facility]  L Hand General: 3-/5  LUE Strength Comment: 3-/5 all planes  RUE Strength  Gross RUE Strength: Exceptions to [facility]  R Hand General: 3-/5  RUE Strength Comment: 3-/5 all planes    Coordination, Tone, Quality of Movement:    Tone RUE  RUE Tone: Normotonic  Tone FOLLOW UP: Yes       Assessment/Discharge Disposition:  Assessment: Pt is a 68 y/o female from home with spouse who presents to [address] with the above deficits which impact her independence and safety during ADLs. Pt would benefit from OT services to maximize independence during self care.    Performance deficits / Impairments: Decreased functional mobility , Decreased safe awareness, Decreased endurance, Decreased high-level IADLs, Decreased ADL status, Decreased strength, Decreased cognition, Decreased vision/visual deficit, Decreased fine motor control  Prognosis: Good  Discharge Recommendations: Continue to assess pending progress  Decision Making: Medium Complexity  History: Pt's history is mildly complex  Exam: Pt with 9 perf deficits  Assistance / Modification: supervision    Six Click Score   How much help for putting on and taking off regular lower body clothing?: A Little  How much help for Bathing?: A Little  How much help for Toileting?: A Little  How much help for putting on and taking off regular upper body clothing?: A Little  How much help for taking care of personal grooming?: A Little  How much help for eating meals?: None  AM-[facility] Inpatient Daily Activity Raw Score: 19  AM-PAC Inpatient ADL T-Scale Score : 40.22  ADL Inpatient CMS 0-100% Score: 42.8    Plan:  Plan  Times per week: 1-3x  Plan weeks: length of acute stay  Current Treatment Recommendations: Strengthening, Endurance Training, Neuromuscular Re-education, Patient/Caregiver Education & Training, Cognitive/Perceptual Training, Self-Care / ADL, Functional Mobility Training, Safety Education & Training    Goals:   Patient will:    - Improve functional endurance to tolerate/complete 60 mins of ADL's  - Be Independent in UB ADLs   - Be Independent in LB ADLs  - Be Independent in ADL transfers without LOB  - Be Independent in toileting tasks  - Improve B hand fine motor coordination to [facility] in order to manage clothing fasteners/self-care containers

## 2020-04-11 NOTE — PROGRESS NOTES
INPATIENT PROGRESS NOTES    PATIENT NAME: Tony Murphy  MRN: [id number]  SERVICE DATE:  April 11, 2020   SERVICE TIME:  2:38 PM      PRIMARY SERVICE: Pulmonary Disease    CHIEF COMPLAIN: Shortness of breath      INTERVAL HPI: Patient seen and examined at bedside, Interval Notes, orders reviewed. Nursing notes noted  Patient states she is feeling little better today. Shortness of breath improved. She is on 3 L O2 via nasal cannula and oxygen saturation 100%. She has no chest pain no pleuritic pain. No hemoptysis. No nausea, vomiting or diarrhea. .  OBJECTIVE    Body mass index is 19.92 kg/m². PHYSICAL EXAM:  Vitals:  /72   Pulse 86   Temp 97.5 °F (36.4 °C) (Oral)   Resp 20   Ht 5' (1.524 m)   Wt 102 lb (46.3 kg)   LMP  (LMP Unknown)   SpO2 100%   BMI 19.92 kg/m²   General: Alert, awake . comfortable in bed, No distress. appears stated age and cooperative  Head: Atraumatic , Normocephalic   Eyes: PERRL. No sclera icterus. No conjunctival injection. No discharge   ENT: No nasal  discharge. Pharynx clear. lips, teeth, mucosa and gums are normal, tongue protrudes in the midline  Neck:  Trachea midline. No thyromegaly, no JVD, No cervical adenopathy. Chest : Bilaterally symmetrical ,Normal effort,  No accessory muscle use  Lung : Bibasilar rales. No wheezing. Decreased breath sound at bases. Heart[de-identified] Normal  rate. Regular rhythm. No mumur ,  Rub or gallop  ABD: Non-tender. Non-distended. No masses. No organmegaly. Normal bowel sounds. No hernia.   Ext : Trace pitting both leg , No Cyanosis No clubbing  Neuro: no focal weakness          DATA:   Recent Labs     04/10/20  0544 04/11/20  0809   WBC 6.5 9.5   HGB 6.6* 7.1*   HCT 20.6* 22.1*   MCV 88.3 89.5    268     Recent Labs     04/09/20  0445  04/10/20  0544 04/11/20  0809     --  149* 145*   K 3.9  --  4.4 4.4     --  106 104   CO2 23  --  27 25   BUN 63*  --  63* 54*   CREATININE 0.83   < > 0.91* 0.88   GLUCOSE 134*  --  119* 182*   CALCIUM 8.3*  --  8.3* 8.2*   PROT 4.6*  --   --   --    LABALBU 2.6*  --   --   --    BILITOT 0.4  --   --   --    ALKPHOS 68  --   --   --    *  --   --   --    *  --   --   --    LABGLOM >60.0   < > >60.0 >60.0   GFRAA >60.0   < > >60.0 >60.0   GLOB 2.0*  --   --   --     < > = values in this interval not displayed. MV Settings:     Vent Mode: CPAP  Vt Ordered: 320 mL  Rate Set: 26 bmp  FiO2 : 35 %  PEEP/CPAP: 6  Pressure Support: 5 cmH20  Peak Inspiratory Pressure: 10 cmH2O  Mean Airway Pressure: 14 cmH20  I:E Ratio: 1:2    Recent Labs     04/09/20  1716   PHART 7.465*   NCX1HKM 43   PO2ART 112*   BAS0BOE 31.3*   BEART 8*   L0FCSWPE 99*       O2 Device: Nasal cannula  O2 Flow Rate (L/min): 3 L/min    DIET DENTAL SOFT;  Dietary Nutrition Supplements: Standard High Calorie Oral Supplement     MEDICATIONS during current hospitalization:    Continuous Infusions:      Scheduled Meds:   metoprolol succinate  100 mg Oral Daily    sodium chloride flush  10 mL Intravenous 2 times per day    famotidine (PEPCID) injection  20 mg Intravenous BID    enoxaparin  40 mg Subcutaneous Daily    furosemide  20 mg Intravenous Daily    nitroglycerin  1 inch Topical 4 times per day    methylPREDNISolone  40 mg Intravenous Q6H       PRN Meds:sodium chloride flush, acetaminophen **OR** acetaminophen, polyethylene glycol, promethazine **OR** ondansetron    Radiology  Cta Chest W Wo Contrast    Result Date: 4/9/2020  EXAMINATION: CTA CHEST W WO CONTRAST  DATE AND TIME:4/9/2020 12:45 PM CLINICAL HISTORY: Acute chest pain. Shortness of breath  Acute hypoxic respiratory failure  COMPARISON: 0/00/3742 TECHNIQUE: Helical axial CTA of the chest was performed following the intravenous administration of 100 mL Optiray 320 intravenous contrast.  2D images were reconstructed in the axial and coronal planes. 3-D MIP images were generated in the coronal plane. Images were reviewed on the PACS workstation.   All images including the 3D MIPS were permanently archived. All CT scans at this facility use dose modulation, iterative reconstruction, and/or weight based dosing when appropriate to reduce radiation dose to as low as reasonably achievable. FINDINGS: Pulmonary arteries: There is good opacification of the main, lobar, segmental and proximal subsegmental pulmonary artery branches. No sign of pulmonary embolus in the central pulmonary arterial tree. No evidence of thoracic aortic aneurysm or dissection. Lungs: There are bilateral effusions left greater than right. The previously noted multiple bilateral cavitating nodules are less apparent 2 to some underlying atelectatic change some mild patches of groundglass opacity are now present as well. Again primary differential would include infection such as fungal disease, mycobacterium, viral; Wegener's or rheumatoid nodules not excluded. Septic emboli are unlikely given the absence of any signs of acute or chronic thromboembolic disease. Mediastinum: No other significant abnormality. No lymphadenopathy. No pericardial effusion. Trachea: There is an ET tube in satisfactory position with its tip approximately 2.5 cm above the diaz. Vessels:Thoracic aorta is intact. Visualized abdomen: The visualized portions of the upper abdomen are unremarkable. Bones: No acute bone pathology     NO EVIDENCE OF PULMONARY EMBOLISM. PREVIOUSLY NOTED MULTIPLE CAVITATING NODULES ARE LESS STRIKING WITH MORE COALESCENCE/CONSOLIDATION OF THESE NODULAR AREAS WITH NEW BILATERAL EFFUSIONS.      Xr Chest Portable    Result Date: 2020  Patient MRN: [id number] : 1950 Age:  69 years Gender: Female Order Date: 2020 4:45 AM. Exam: XR CHEST PORTABLE Number of Views: 1 Indication:  Shortness of breath status post intubation Comparison: 12/18/2019 Findings: Stable, prominent cardiomediastinal silhouette with an endotracheal tube with its distal tip approximately 3 cm superior to the diaz. Vascular calcifications thoracic aorta. No pneumothorax. Underlying abnormal reticulonodular lung interstitial pattern with superimposed airspace disease bilateral mid lungs and bilateral lower lobes with bilateral pleural effusions. No pneumothorax. Impression:  1. Abnormal underlying reticulonodular interstitial pattern is nonspecific and could reflect typical versus atypical infection, edema or other etiology. 2. Superimposed airspace disease bilateral mid lungs and bilateral lower lobes concerning for superimposed bilateral multifocal infiltrate/pneumonia, of indeterminate etiology and origin. 3. Small bilateral pleural effusions. IMPRESSION AND SUGGESTION:  1. Acute hypoxic and hypercapnic respiratory failure, resolved  2. Wegener's granulomatosis  3. Atrial fibrillation  4. Anemia receiving blood transfusion  5. Chronic sinus infection/congestion      Currently on IV Solu-Medrol 40 mg q. 6 hourly. He is on 3 L O2 via nasal cannula. She had blood transfusion last yesterday and hemoglobin is 7.1 today. CT chest reported no evidence of pulmonary embolism. Previously noted multiple cavitating nodules are less striking with more consolidation of this nodular area with new bilateral effusion. Will follow.   Continue present treatment plan  Electronically signed by Adan Mary MD, FCCP on 4/11/2020 at 2:38 PM

## 2020-04-11 NOTE — PROGRESS NOTES
exhibits no distension and no mass. There is no abdominal tenderness. There is no rebound and no guarding. Blood pressure 131/72, pulse 86, temperature 97.5 °F (36.4 °C), temperature source Oral, resp. rate 20, height 5' (1.524 m), weight 102 lb (46.3 kg), SpO2 100 %, not currently breastfeeding.       .   Lab Results   Component Value Date    WBC 9.5 04/11/2020    HGB 7.1 (L) 04/11/2020    HCT 22.1 (L) 04/11/2020    MCV 89.5 04/11/2020     04/11/2020     Lab Results   Component Value Date     04/11/2020    K 4.4 04/11/2020     04/11/2020    CO2 25 04/11/2020    BUN 54 04/11/2020    CREATININE 0.88 04/11/2020    GLUCOSE 182 04/11/2020    GLUCOSE 87 03/10/2020    CALCIUM 8.2 04/11/2020                ASSESSMENT:  PLAN:    Patient with chronic lung disease from Wegener's granulomatosis  With superimposed CHF    Findings consistent with this not consistent with coronavirus infection especially given 2- tests while patient was hospitalized at the [facility] clinic over the past 3 weeks  Okay to remove from isolation

## 2020-04-11 NOTE — PROGRESS NOTES
[facility] Progress Note      Patient: Huyen Santos  Unit/Bed: P531/C665-64  YOB: 1950  MRN: [id number]  Admit Date:  4/9/2020  HOSPITAL day #   Cardiac history : As detailed by Dr. Alka Carrero on 4/9/20 :  [facility] admit March 13th through April 8. Presented with acute kidney injury and hyperkalemia. Also 30 pound weight loss chronic cough. She did  sepsis found to have fusiform bacteremia. 2 episodes of flash pulmonary edema felt related to her mitral regurgitation and hypertension. Also found to have ANCA positive granulomatosis with polyangiitis treated with methylprednisolone and rituximab. 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 Chest lesions appeared to decrease with therapy. C. Diff colitis felt resolved  COVID r/o multiple occasions  Troponins neg on several occasions, most recent 4/5/2020     CARDIAC HISTORY:  Hypertension  Persistent atrial fibrillation, new onset  2019. Rapid rates. Antiarrhythmics not effective. 12/17/19 AV node ablation. [employer] [place] XT DR SALINAS pacemaker implantation  Hx PSVT  No known CAD with non diagnostic treadmill stress JUN 2019, remote neg perfusion study  Hyperlipidemia  3/30/3020 Pacer check at [facility]: Normal function. 3% of time with atrial fib, occasionally to 7 hours. No VT.  3/20/2020 Echo LVEF 71%. LVH. Normal RV 2-3+ MR. RVSP 30 mm Hg. 4/5/2020 Troponin negative [facility]. Subjective Complaint:   Denies any chest pain or SOB. Has not ambulated. Laying flat in bed. Her hearing is much improved since prior admissions !!!     Physical Examination:     /72   Pulse 86   Temp 97.5 °F (36.4 °C) (Oral)   Resp 20   Ht 5' (1.524 m)   Wt 102 lb (46.3 kg)   LMP  (LMP Unknown)   SpO2 100%   BMI 19.92 kg/m²         Intake/Output Summary (Last 24 hours) at 4/11/2020 1344  Last data filed at 4/11/2020 0758  Gross per 24 hour   Intake 1090 ml   Output --   Net 1090 ml     Weights  Wt Readings from Last 3 Encounters:   04/10/20 102 lb

## 2020-04-11 NOTE — PROGRESS NOTES
previous study. These are not demonstrated on the study from 3/26/2009, developed on the 10/24/2014 exam, improved on the 2/25/2015 exam and became worse on 7/17/2015 indicating a remitting relapsing course. The   nodules/masses are in a perivascular/peribronchial distribution. Differential includes granulomatosis with polyangiitis (formerly Wegener's granulomatosis), recurrent septic emboli, indolent neoplasm (e.g. lymphomatoid granulomatosis), eosinophilic granulomatosis with polyangiitis. Primary vasculitis less likely as   lesions are more solid masses rather than infiltration. Sarcoidosis considered unlikely due to lack of adenopathy. Metastatic disease is not excluded but the overall course compared to the previous studies is atypical.    There is no generalized interstitial lung disease or pleural effusions. Nonenlarged by size criteria mediastinal lymph nodes the largest in the right paratracheal region is fatty replaced and measures approximately 8 mm. Left-sided pacemaker/defibrillator is present with one lead in the right ventricle and the other the right atrium. Moderate atherosclerotic calcifications in the aortic arch and at the origin of the left coronary artery. Visualized bony structures are unremarkable. Impression Increasing size and number of cavitary mid to lower lung field masses which compared to the more remote studies have a relapsing remitting course. Differential described in detail above. Correlate with tissue sampling and culture if this has not already been performed. CXR      2-view:   Results for orders placed during the hospital encounter of 12/05/19   XR CHEST STANDARD (2 VW)    Narrative XR CHEST (2 VW)    Exam Date/Time:  12/18/2019 8:00 AM  Clinical History:   lead positioning   Comparison:  12/17/2019.       RESULT:     Lines, tubes, and devices:  Left transvenous pacemaker with leads in the region of the right atrium and right ventricle, unchanged. Lungs and pleura:  Bibasilar opacities, right greater than left, unchanged. Small bilateral pleural effusions or blunting of the costophrenic angles, unchanged. No pneumothorax. Cardiomediastinal silhouette:  Stable cardiomediastinal silhouette. Aortic atherosclerotic calcification. Other:  No acute osseous findings. Impression No significant interval change from prior. XR CHEST STANDARD (2 VW)    Narrative EXAMINATION: Chest x-ray, 2 view    HISTORY: Pneumonia. COPD. TECHNIQUE: Frontal and lateral views of the chest    COMPARISON: Chest x-ray from 2019    FINDINGS:     Heart size is at the upper limits of normal. Interval improvement but persistence of bilateral airspace opacities, right greater the left. Small bilateral pleural effusions. No pneumothorax. Lungs are hyperinflated. Coarsening of the pulmonary   interstitium. Degenerative changes of the spine. Impression Interval improvement but persistence of bilateral infiltrates, right greater than left. Small bilateral pleural effusions. Portable:   Results for orders placed during the hospital encounter of 20   XR CHEST PORTABLE    Narrative Patient MRN: [id number]    : 1950    Age:  69 years    Gender: Female    Order Date: 2020 4:45 AM.     Exam: XR CHEST PORTABLE    Number of Views: 1     Indication:  Shortness of breath status post intubation    Comparison: 2019    Findings: Stable, prominent cardiomediastinal silhouette with an endotracheal tube with its distal tip approximately 3 cm superior to the diaz. Vascular calcifications thoracic aorta. No pneumothorax. Underlying abnormal reticulonodular lung   interstitial pattern with superimposed airspace disease bilateral mid lungs and bilateral lower lobes with bilateral pleural effusions. No pneumothorax. Impression Impression:    1.  Abnormal underlying reticulonodular interstitial pattern is nonspecific and

## 2020-04-12 NOTE — PROGRESS NOTES
Pt not ready to go on bipap at this time. Size Of Lesion In Cm (Optional): 0 Detail Level: Zone Detail Level: Simple within functional limits

## 2020-04-12 NOTE — PROGRESS NOTES
JOEY AT [facility] NOTIFIED OF PT DEATH. REF = [id number]. INFORMED PT NOT CANTIDATE FOR  DONATION AND OK GIVEN TO RELEASE HER TO  HOME.  REQUESTED RIDDLE  HOME IN [place].   WILL NOTIFY THEM

## 2020-04-14 LAB
BLOOD CULTURE, ROUTINE: NORMAL
CULTURE, BLOOD 2: NORMAL